# Patient Record
Sex: MALE | Race: WHITE | NOT HISPANIC OR LATINO | Employment: OTHER | URBAN - NONMETROPOLITAN AREA
[De-identification: names, ages, dates, MRNs, and addresses within clinical notes are randomized per-mention and may not be internally consistent; named-entity substitution may affect disease eponyms.]

---

## 2017-01-09 ENCOUNTER — OFFICE VISIT (OUTPATIENT)
Dept: PODIATRY | Facility: CLINIC | Age: 57
End: 2017-01-09

## 2017-01-09 VITALS — HEIGHT: 70 IN | WEIGHT: 220 LBS | BODY MASS INDEX: 31.5 KG/M2

## 2017-01-09 DIAGNOSIS — Z89.431 S/P TRANSMETATARSAL AMPUTATION OF FOOT, RIGHT (HCC): ICD-10-CM

## 2017-01-09 DIAGNOSIS — G62.9 POLYNEUROPATHY: ICD-10-CM

## 2017-01-09 DIAGNOSIS — Z89.422 TOE AMPUTATION STATUS, LEFT: ICD-10-CM

## 2017-01-09 DIAGNOSIS — T14.8XXA DEEP TISSUE INJURY: Primary | ICD-10-CM

## 2017-01-09 PROCEDURE — 99024 POSTOP FOLLOW-UP VISIT: CPT | Performed by: PODIATRIST

## 2017-01-09 RX ORDER — LISINOPRIL AND HYDROCHLOROTHIAZIDE 25; 20 MG/1; MG/1
TABLET ORAL
Refills: 1 | COMMUNITY
Start: 2016-11-29

## 2017-01-09 NOTE — MR AVS SNAPSHOT
"                        Damian Wild   1/9/2017 10:00 AM   Office Visit    Dept Phone:  [phone]   Encounter #:  [id number]    Provider:  Estuardo Fried DPM   Department:  [facility] PODIATRY                Your Full Care Plan              Your Updated Medication List          This list is accurate as of: 1/9/17 10:17 AM.  Always use your most recent med list.                lisinopril-hydrochlorothiazide 20-25 MG per tablet   Commonly known as:  PRINZIDE,ZESTORETIC               Instructions     None    Patient Instructions History      Upcoming Appointments     Visit Type Date Time Department    OFFICE VISIT 1/9/2017 10:00 AM [facility] PODIATRY SURG POW    FOLLOW UP 1/23/2017  9:00 AM [facility] PODIATRY SURG POW      MyChart Signup     Our records indicate that you have declined [facility] MyChart signup. If you would like to sign up for MyChart, please email [email or web address] or call [phone] to obtain an activation code.             Other Info from Your Visit           Your Appointments     Jan 23, 2017  9:00 AM CST   Follow Up with Estuardo Fried DPM   [facility] PODIATRY (--)    [address] [place] [address] Dr Chet [place] [id number] [phone]           Arrive 15 minutes prior to appointment.              Allergies     No Known Allergies      Reason for Visit     Left Foot - Post-op           Vital Signs     Height Weight Body Mass Index             70\" (177.8 cm) 220 lb (99.8 kg) 31.57 kg/m2           "

## 2017-01-09 NOTE — PROGRESS NOTES
"Damian Wild  1960  56 y.o. male   Patient presents today for a post op visit after being d/c from the hospital.    01/09/2017  Chief Complaint   Patient presents with   • Left Foot - Post-op           History of Present Illness    Mr Wild is a 57 y/o male who underwent open left hallux amputation on 12/27 with revision to partial 1st ray and closure on 12/29. He presents for follow up, pain improved. Denies N/V/F/C/SOB. Currently taking PO Keflex. Also has left lateral foot deep tissue injury which is painful.        No past medical history on file.      No past surgical history on file.      No family history on file.      Social History     Social History   • Marital status:      Spouse name: N/A   • Number of children: N/A   • Years of education: N/A     Occupational History   • Not on file.     Social History Main Topics   • Smoking status: Not on file   • Smokeless tobacco: Not on file   • Alcohol use Not on file   • Drug use: Not on file   • Sexual activity: Not on file     Other Topics Concern   • Not on file     Social History Narrative         Current Outpatient Prescriptions   Medication Sig Dispense Refill   • lisinopril-hydrochlorothiazide (PRINZIDE,ZESTORETIC) 20-25 MG per tablet   1     No current facility-administered medications for this visit.          OBJECTIVE    Visit Vitals   • Ht 70\" (177.8 cm)   • Wt 220 lb (99.8 kg)   • BMI 31.57 kg/m2         Review of Systems   Constitutional:  Denies recent weight loss, weight gain, fever or chills, no change in exercise tolerance  Musculoskeletal: Foot pain. H/o toe amputations   Skin:  Thickened nails. Left foot wounds.  Neurological:  Burning sensations to feet b/l.  Psychiatric/Behavioral: Denies depression        Physical Exam   Constitutional: he appears well-developed and well-nourished.   Psychiatric: he has a normal mood and affect. his   behavior is normal.      Lower Extremity Exam:  Vascular: DP/PT pulses palpable 1+. "   Negative hair growth.   Minimal left foot edema  Feel cool to touch  Neuro: Protective sensation absent, b/l.  DTRs intact  Integument:   Atrophic skin noted b/l  Left partial 1st ray incision coapted with sutures intact, no SOI  Dorsal foot bulla desiccated, healing well. No SOI  Stable DTI to lateral 5th met base. No fluctuance or SOI. +pain on palpation  Musculoskeletal: LE muscle strength 5/5.   Gait assisted normal  Semi-rigid hammertoe deformity toes 2-5 on left  Nails 2-5 on left thickened, elongated with subungual debris. +pain on palpation  Right TMA          ASSESSMENT AND PLAN    Damian was seen today for post-op.    Diagnoses and all orders for this visit:    Deep tissue injury    Toe amputation status, left    Polyneuropathy    S/P transmetatarsal amputation of foot, right      -Comprehensive foot and ankle exam performed  -Doing well postoperatively  -Left foot incision and DTI dressed with bacitracin and DSD  -Surgical shoe at all times  -Leave dressing c/d/i  -Recheck 2 weeks, likely suture removal          This document has been electronically signed by Estuardo Fried DPM on January 9, 2017 7:03 PM     Estuardo Fried DPM  1/9/2017  7:02 PM

## 2017-01-23 ENCOUNTER — OFFICE VISIT (OUTPATIENT)
Dept: PODIATRY | Facility: CLINIC | Age: 57
End: 2017-01-23

## 2017-01-23 VITALS — HEIGHT: 70 IN | WEIGHT: 220 LBS | BODY MASS INDEX: 31.5 KG/M2

## 2017-01-23 DIAGNOSIS — G62.9 POLYNEUROPATHY: ICD-10-CM

## 2017-01-23 DIAGNOSIS — T81.31XA SURGICAL WOUND BREAKDOWN, INITIAL ENCOUNTER: ICD-10-CM

## 2017-01-23 DIAGNOSIS — Z89.422 TOE AMPUTATION STATUS, LEFT: ICD-10-CM

## 2017-01-23 DIAGNOSIS — L97.522 FOOT ULCER WITH FAT LAYER EXPOSED, LEFT (HCC): Primary | ICD-10-CM

## 2017-01-23 DIAGNOSIS — Z89.431 S/P TRANSMETATARSAL AMPUTATION OF FOOT, RIGHT (HCC): ICD-10-CM

## 2017-01-23 PROCEDURE — 11042 DBRDMT SUBQ TIS 1ST 20SQCM/<: CPT | Performed by: PODIATRIST

## 2017-01-23 PROCEDURE — 99024 POSTOP FOLLOW-UP VISIT: CPT | Performed by: PODIATRIST

## 2017-01-23 NOTE — MR AVS SNAPSHOT
"                        Damian Wild   1/23/2017 9:00 AM   Office Visit    Dept Phone:  [phone]   Encounter #:  [id number]    Provider:  Estuardo Fried DPM   Department:  [facility] PODIATRY                Your Full Care Plan              Your Updated Medication List          This list is accurate as of: 1/23/17  9:19 AM.  Always use your most recent med list.                lisinopril-hydrochlorothiazide 20-25 MG per tablet   Commonly known as:  PRINZIDE,ZESTORETIC               Instructions     None    Patient Instructions History      Upcoming Appointments     Visit Type Date Time Department    FOLLOW UP 1/23/2017  9:00 AM [facility] PODIATRY SURG POW    FOLLOW UP 2/6/2017  9:00 AM [facility]ODIATRY SURG POW      MyChart Signup     Our records [facility] that you [facility]. If you would like to sign up for MyChart, please email [email or web address] or call [phone] to obtain an activation code.             Other Info from Your Visit           Your Appointments     Feb 06, 2017  9:00 AM CST   Follow Up with Estuardo Fried DPM   [facility] PODIATRY (--)    [address] [place] [address] Dr Chet [place] [id number] [phone]           Arrive 15 minutes prior to appointment.              Allergies     No Known Allergies      Reason for Visit     Left Foot - Follow-up           Vital Signs     Height Weight Body Mass Index             70\" (177.8 cm) 220 lb (99.8 kg) 31.57 kg/m2           "

## 2017-01-23 NOTE — PROGRESS NOTES
"Damian Wild  1960  56 y.o. male   Patient presents today for a post op visit after being d/c from the hospital.    01/23/2017  Chief Complaint   Patient presents with   • Left Foot - Follow-up           History of Present Illness    Mr Wild is a 55 y/o male who underwent open left hallux amputation on 12/27 with revision to partial 1st ray and closure on 12/29. He presents for follow up, pain improved. Denies N/V/F/C/SOB. Has completed PO Keflex course. Left lateral foot deep tissue injury improving.        No past medical history on file.      No past surgical history on file.      No family history on file.      Social History     Social History   • Marital status:      Spouse name: N/A   • Number of children: N/A   • Years of education: N/A     Occupational History   • Not on file.     Social History Main Topics   • Smoking status: Not on file   • Smokeless tobacco: Not on file   • Alcohol use Not on file   • Drug use: Not on file   • Sexual activity: Not on file     Other Topics Concern   • Not on file     Social History Narrative         Current Outpatient Prescriptions   Medication Sig Dispense Refill   • lisinopril-hydrochlorothiazide (PRINZIDE,ZESTORETIC) 20-25 MG per tablet   1     No current facility-administered medications for this visit.          OBJECTIVE    Visit Vitals   • Ht 70\" (177.8 cm)   • Wt 220 lb (99.8 kg)   • BMI 31.57 kg/m2         Review of Systems   Constitutional:  Denies recent weight loss, weight gain, fever or chills, no change in exercise tolerance  Musculoskeletal: Foot pain. H/o toe amputations   Skin:  Thickened nails. Left foot wounds.  Neurological:  Burning sensations to feet b/l.  Psychiatric/Behavioral: Denies depression        Physical Exam   Constitutional: he appears well-developed and well-nourished.   Psychiatric: he has a normal mood and affect. his   behavior is normal.      Lower Extremity Exam:  Vascular: DP/PT pulses palpable 1+.   Negative " hair growth.   Minimal left foot edema  Feel cool to touch  Neuro: Protective sensation absent, b/l.  DTRs intact  Integument:   Atrophic skin noted b/l  Left partial 1st ray incision coapted with sutures intact, no SOI. Slight breakdown of distal incision. Wound measuring 1.5 x 0.6 x 0.3 cm   Dorsal foot bulla desiccated, healing well. No SOI  Stable DTI to lateral 5th met base. No fluctuance or SOI. +pain on palpation  1.0 x 0.8 x 0.3 cm FT ulcer to medial midfoot. Fibrous base. No SOI  Musculoskeletal: LE muscle strength 5/5.   Gait  normal  Semi-rigid hammertoe deformity toes 2-5 on left  Nails 2-5 on left thickened, elongated with subungual debris. +pain on palpation  Right TMA    Left foot wound debridement:  Risks and benefits discussed.  Right hallux ulcer debrided of surrounding hyperkeratosis and devitalized tissue with 15 blade to level of subq  Pressure hemostasis obtained  Abx ointment and dsd applied.        ASSESSMENT AND PLAN    Damian was seen today for follow-up.    Diagnoses and all orders for this visit:    Foot ulcer with fat layer exposed, left    Toe amputation status, left    Polyneuropathy    S/P transmetatarsal amputation of foot, right    -Comprehensive foot and ankle exam performed  -Pt educated on standard wound care staples including offloading, dressing changes, and serial debridements.  -Wound debridement as above  -Doing well postoperatively  -Several sutures removed, few left intact  -Left foot incision and DTI dressed with bacitracin and DSD  -Surgical shoe at all times  -Leave dressing c/d/i  -Recheck 2 weeks          This document has been electronically signed by Estuardo Fried DPM on January 23, 2017 12:06 PM     Estuardo Fried DPM  1/23/2017  12:06 PM

## 2017-02-06 ENCOUNTER — OFFICE VISIT (OUTPATIENT)
Dept: PODIATRY | Facility: CLINIC | Age: 57
End: 2017-02-06

## 2017-02-06 VITALS — HEIGHT: 70 IN | BODY MASS INDEX: 31.5 KG/M2 | WEIGHT: 220 LBS

## 2017-02-06 DIAGNOSIS — Z89.431 S/P TRANSMETATARSAL AMPUTATION OF FOOT, RIGHT (HCC): ICD-10-CM

## 2017-02-06 DIAGNOSIS — M79.671 FOOT PAIN, RIGHT: ICD-10-CM

## 2017-02-06 DIAGNOSIS — T14.8XXA DEEP TISSUE INJURY: Primary | ICD-10-CM

## 2017-02-06 DIAGNOSIS — L97.522 FOOT ULCER WITH FAT LAYER EXPOSED, LEFT (HCC): ICD-10-CM

## 2017-02-06 DIAGNOSIS — Z89.422 TOE AMPUTATION STATUS, LEFT: ICD-10-CM

## 2017-02-06 PROCEDURE — 11042 DBRDMT SUBQ TIS 1ST 20SQCM/<: CPT | Performed by: PODIATRIST

## 2017-02-06 PROCEDURE — 99213 OFFICE O/P EST LOW 20 MIN: CPT | Performed by: PODIATRIST

## 2017-02-06 NOTE — PROGRESS NOTES
"Damian Wild  1960  56 y.o. male   Patient presents today for a post op visit after being d/c from the hospital.    02/06/2017  Chief Complaint   Patient presents with   • Left Foot - Follow-up, Post-op           History of Present Illness    Mr Wild is a 57 y/o male who underwent open left hallux amputation on 12/27 with revision to partial 1st ray and closure on 12/29. He presents for follow up, pain improved. Denies N/V/F/C/SOB. He does have a new problem of pressure wound on his right anterior ankle.      No past medical history on file.      No past surgical history on file.      No family history on file.      Social History     Social History   • Marital status:      Spouse name: N/A   • Number of children: N/A   • Years of education: N/A     Occupational History   • Not on file.     Social History Main Topics   • Smoking status: Never Smoker   • Smokeless tobacco: Not on file   • Alcohol use No   • Drug use: Not on file   • Sexual activity: Not on file     Other Topics Concern   • Not on file     Social History Narrative   • No narrative on file         Current Outpatient Prescriptions   Medication Sig Dispense Refill   • lisinopril-hydrochlorothiazide (PRINZIDE,ZESTORETIC) 20-25 MG per tablet   1     No current facility-administered medications for this visit.          OBJECTIVE    Visit Vitals   • Ht 70\" (177.8 cm)   • Wt 220 lb (99.8 kg)   • BMI 31.57 kg/m2         Review of Systems   Constitutional:  Denies recent weight loss, weight gain, fever or chills, no change in exercise tolerance  Musculoskeletal: Foot pain. H/o toe amputations   Skin:  Thickened nails. Left foot wounds.  Neurological:  Burning sensations to feet b/l.  Psychiatric/Behavioral: Denies depression        Physical Exam   Constitutional: he appears well-developed and well-nourished.   Psychiatric: he has a normal mood and affect. his   behavior is normal.      Lower Extremity Exam:  Vascular: DP/PT pulses " palpable 1+.   Negative hair growth.   Minimal left foot edema  Feel cool to touch  Neuro: Protective sensation absent, b/l.  DTRs intact  Integument:   Atrophic skin noted b/l  Left partial 1st ray incision coapted with some sutures intact, no SOI. Slight breakdown of distal incision. Wound measuring 1.2 x 0.6 x 0.3 cm . Fibrous base.  Dorsal foot bulla desiccated, healing well. No SOI  Stable DTI to left lateral 5th met base. No fluctuance or SOI. +pain on palpation  1.0 x 0.8 x 0.1 cm FT ulcer to left medial midfoot. Fibrous base. No SOI  New DTI noted to dorsal right ankle, lateral 5th met base. No fluctuance or SOI.  Musculoskeletal: LE muscle strength 5/5.   Gait  normal  Semi-rigid hammertoe deformity toes 2-5 on left  Nails 2-5 on left thickened, elongated with subungual debris. +pain on palpation  Right TMA    Left foot wound debridement:  Risks and benefits discussed.  Right hallux ulcer debrided of surrounding hyperkeratosis and devitalized tissue with 15 blade to level of subq  Pressure hemostasis obtained  Medihoney and dsd applied.        ASSESSMENT AND PLAN    Damian was seen today for follow-up and post-op.    Diagnoses and all orders for this visit:    Deep tissue injury    Foot ulcer with fat layer exposed, left    Toe amputation status, left    S/P transmetatarsal amputation of foot, right    Foot pain, right      -Comprehensive foot and ankle exam performed  -Pt educated on standard wound care staples including offloading, dressing changes, and serial debridements.  -Wound debridement as above  -Doing well postoperatively  -Remaining sutures removed  -Left foot incision and DTI dressed with Medihoney and DSD  -New DTI on right stable without signs of infection medihoney and bandaid applied.  -Surgical shoe at all times  -Leave dressing c/d/i  -Recheck 2 weeks          This document has been electronically signed by Estuardo Fried DPM on February 6, 2017 5:52 PM     Estuardo Fried  NAVJOT  2/6/2017  5:52 PM

## 2017-02-20 ENCOUNTER — OFFICE VISIT (OUTPATIENT)
Dept: PODIATRY | Facility: CLINIC | Age: 57
End: 2017-02-20

## 2017-02-20 VITALS — BODY MASS INDEX: 31.5 KG/M2 | HEIGHT: 70 IN | WEIGHT: 220 LBS

## 2017-02-20 DIAGNOSIS — Z89.431 S/P TRANSMETATARSAL AMPUTATION OF FOOT, RIGHT (HCC): ICD-10-CM

## 2017-02-20 DIAGNOSIS — L97.512 FOOT ULCER WITH FAT LAYER EXPOSED, RIGHT (HCC): ICD-10-CM

## 2017-02-20 DIAGNOSIS — T14.8XXA DEEP TISSUE INJURY: Primary | ICD-10-CM

## 2017-02-20 DIAGNOSIS — L97.522 FOOT ULCER WITH FAT LAYER EXPOSED, LEFT (HCC): ICD-10-CM

## 2017-02-20 DIAGNOSIS — Z89.422 TOE AMPUTATION STATUS, LEFT: ICD-10-CM

## 2017-02-20 PROCEDURE — 11042 DBRDMT SUBQ TIS 1ST 20SQCM/<: CPT | Performed by: PODIATRIST

## 2017-02-20 PROCEDURE — 99024 POSTOP FOLLOW-UP VISIT: CPT | Performed by: PODIATRIST

## 2017-02-20 NOTE — PROGRESS NOTES
"Damian Wild  1960  56 y.o. male   Patient presents today for a post op visit after being d/c from the hospital.    02/20/2017  Chief Complaint   Patient presents with   • Left Foot - Follow-up, Post-op           History of Present Illness    Mr Wild is a 57 y/o male who underwent open left hallux amputation on 12/27 with revision to partial 1st ray and closure on 12/29. He also presents for follow-up of bilateral foot pressure ulcer.      No past medical history on file.      No past surgical history on file.      No family history on file.      Social History     Social History   • Marital status:      Spouse name: N/A   • Number of children: N/A   • Years of education: N/A     Occupational History   • Not on file.     Social History Main Topics   • Smoking status: Never Smoker   • Smokeless tobacco: Not on file   • Alcohol use No   • Drug use: Not on file   • Sexual activity: Not on file     Other Topics Concern   • Not on file     Social History Narrative         Current Outpatient Prescriptions   Medication Sig Dispense Refill   • lisinopril-hydrochlorothiazide (PRINZIDE,ZESTORETIC) 20-25 MG per tablet   1     No current facility-administered medications for this visit.          OBJECTIVE    Visit Vitals   • Ht 70\" (177.8 cm)   • Wt 220 lb (99.8 kg)   • BMI 31.57 kg/m2         Review of Systems   Constitutional:  Denies recent weight loss, weight gain, fever or chills, no change in exercise tolerance  Musculoskeletal: Foot pain. H/o toe amputations   Skin:  Thickened nails. Bilateral foot wounds.  Neurological:  Burning sensations to feet b/l.  Psychiatric/Behavioral: Denies depression        Physical Exam   Constitutional: he appears well-developed and well-nourished.   Psychiatric: he has a normal mood and affect. his   behavior is normal.      Lower Extremity Exam:  Vascular: DP/PT pulses palpable 1+.   Negative hair growth.   Minimal left foot edema  Feel cool to touch  Neuro: " Protective sensation absent, b/l.  DTRs intact  Integument:   Atrophic skin noted b/l  Left partial 1st ray incision with Slight breakdown of distal incision. No SOI. Wound measuring 0.8 x 0.6 x 0.3 cm . Fibrous base.  Dorsal foot bulla desiccated, healing well. No SOI  Stable DTI to left lateral 5th met base. No fluctuance or SOI. +pain on palpation  Left dorsal midfoot ulcer healed.  Dorsal right ankle DTI, now FT ulceration 2.5 x 2.0 x 0.3 cm with fibronecrotic base.  Musculoskeletal: LE muscle strength 5/5.   Gait  normal  Semi-rigid hammertoe deformity toes 2-5 on left  Nails 2-5 on left thickened, elongated with subungual debris. +pain on palpation  Right TMA    Bilateral foot wound debridement:  Risks and benefits discussed.  Left medial and right dorsal foot ulcer debrided of surrounding hyperkeratosis and devitalized tissue with 15 blade to level of subq  Pressure hemostasis obtained  Medihoney and dsd applied.        ASSESSMENT AND PLAN    Damian was seen today for follow-up and post-op.    Diagnoses and all orders for this visit:    Deep tissue injury    Foot ulcer with fat layer exposed, left    Toe amputation status, left    S/P transmetatarsal amputation of foot, right    Foot ulcer with fat layer exposed, right    -Comprehensive foot and ankle exam performed  -Pt educated on standard wound care staples including offloading, dressing changes, and serial debridements.  -Wound debridement as above  -Doing well postoperatively  -Left foot incision and DTI dressed with Medihoney and DSD  -New DTI on right stable without signs of infection medihoney and bandaid applied.  -Surgical shoe at all times  -Leave dressing c/d/i  -Recheck 2 weeks          This document has been electronically signed by Estuardo Fried DPM on February 22, 2017 5:14 PM     Estuardo Fried DPM  2/22/2017  5:14 PM

## 2017-03-06 ENCOUNTER — OFFICE VISIT (OUTPATIENT)
Dept: PODIATRY | Facility: CLINIC | Age: 57
End: 2017-03-06

## 2017-03-06 VITALS — HEIGHT: 70 IN | WEIGHT: 220 LBS | BODY MASS INDEX: 31.5 KG/M2

## 2017-03-06 DIAGNOSIS — Z89.431 S/P TRANSMETATARSAL AMPUTATION OF FOOT, RIGHT (HCC): ICD-10-CM

## 2017-03-06 DIAGNOSIS — L97.522 FOOT ULCER WITH FAT LAYER EXPOSED, LEFT (HCC): ICD-10-CM

## 2017-03-06 DIAGNOSIS — L97.512 FOOT ULCER WITH FAT LAYER EXPOSED, RIGHT (HCC): ICD-10-CM

## 2017-03-06 DIAGNOSIS — Z89.422 TOE AMPUTATION STATUS, LEFT: Primary | ICD-10-CM

## 2017-03-06 PROCEDURE — 11042 DBRDMT SUBQ TIS 1ST 20SQCM/<: CPT | Performed by: PODIATRIST

## 2017-03-06 RX ORDER — CLINDAMYCIN HYDROCHLORIDE 300 MG/1
300 CAPSULE ORAL 3 TIMES DAILY
Qty: 30 CAPSULE | Refills: 0 | Status: SHIPPED | OUTPATIENT
Start: 2017-03-06 | End: 2017-03-28 | Stop reason: SDUPTHER

## 2017-03-06 NOTE — PROGRESS NOTES
"Damian Wild  1960  56 y.o. male   Patient presents today for a post op visit after being d/c from the hospital.    03/06/2017  Chief Complaint   Patient presents with   • Left Foot - Follow-up           History of Present Illness    Mr Wild is a 57 y/o male who underwent open left hallux amputation on 12/27 with revision to partial 1st ray and closure on 12/29. He also presents for follow-up of incisional breakdown. Reports increased pain today and a new ulceration to his plantar left foot.      No past medical history on file.      No past surgical history on file.      No family history on file.      Social History     Social History   • Marital status:      Spouse name: N/A   • Number of children: N/A   • Years of education: N/A     Occupational History   • Not on file.     Social History Main Topics   • Smoking status: Never Smoker   • Smokeless tobacco: Not on file   • Alcohol use No   • Drug use: Not on file   • Sexual activity: Not on file     Other Topics Concern   • Not on file     Social History Narrative         Current Outpatient Prescriptions   Medication Sig Dispense Refill   • clindamycin (CLEOCIN) 300 MG capsule Take 1 capsule by mouth 3 (Three) Times a Day. 30 capsule 0   • lisinopril-hydrochlorothiazide (PRINZIDE,ZESTORETIC) 20-25 MG per tablet   1     No current facility-administered medications for this visit.          OBJECTIVE    Visit Vitals   • Ht 70\" (177.8 cm)   • Wt 220 lb (99.8 kg)   • BMI 31.57 kg/m2         Review of Systems   Constitutional:  Denies recent weight loss, weight gain, fever or chills, no change in exercise tolerance  Musculoskeletal: Foot pain. H/o toe amputations   Skin:  Thickened nails. Bilateral foot wounds.  Neurological:  Burning sensations to feet b/l.  Psychiatric/Behavioral: Denies depression        Physical Exam   Constitutional: he appears well-developed and well-nourished.   Psychiatric: he has a normal mood and affect. his   behavior " is normal.      Lower Extremity Exam:  Vascular: DP/PT pulses palpable 1+.   Negative hair growth.   Minimal left foot edema  Feel cool to touch  Neuro: Protective sensation absent, b/l.  DTRs intact  Integument:   Atrophic skin noted b/l  Left partial 1st ray incision with Slight breakdown of distal incision. No SOI. Wound measuring 0.6 x 0.3 x 0.3 cm . Fibrous base.  Dorsal foot bulla desiccated, healing well. No SOI  Stable DTI to left lateral 5th met base. No fluctuance or SOI. +pain on palpation  Dorsal right ankle DTI, now FT ulceration 2.0 x 1.0 x 0.3 cm with fibronecrotic base.  New plantar 2nd mpj ulceration on left. 2.2 x 2.0 x 0.3 cm with fibronecrotic base and surrounding HPK. No SOI.  Musculoskeletal: LE muscle strength 5/5.   Gait  normal  Semi-rigid hammertoe deformity toes 2-5 on left  Nails 2-5 on left thickened, elongated with subungual debris. +pain on palpation  Right TMA    Bilateral foot wound debridement:  Risks and benefits discussed.  Left plantar, medial and right dorsal foot ulcer debrided of surrounding hyperkeratosis and devitalized tissue with 15 blade to level of subq  Pressure hemostasis obtained  Medihoney and dsd applied.        ASSESSMENT AND PLAN    Damian was seen today for follow-up.    Diagnoses and all orders for this visit:    Toe amputation status, left    S/P transmetatarsal amputation of foot, right    Foot ulcer with fat layer exposed, right    Foot ulcer with fat layer exposed, left    Other orders  -     clindamycin (CLEOCIN) 300 MG capsule; Take 1 capsule by mouth 3 (Three) Times a Day.    -Comprehensive foot and ankle exam performed  -Pt educated on standard wound care staples including offloading, dressing changes, and serial debridements.  -Wound debridement as above  -Incision site healing well, however pt with new ulcerations today.  -Left foot incision and DTI dressed with Medihoney and DSD  -New surgical shoe fabricated for ulcer offloading, to be worn at all  times  -Leave dressing c/d/i  -Recheck 1 week          This document has been electronically signed by Estuardo Fried DPM on March 6, 2017 12:24 PM     Estuardo Fried DPM  3/6/2017  12:24 PM

## 2017-03-13 ENCOUNTER — OFFICE VISIT (OUTPATIENT)
Dept: PODIATRY | Facility: CLINIC | Age: 57
End: 2017-03-13

## 2017-03-13 VITALS — WEIGHT: 220 LBS | BODY MASS INDEX: 31.5 KG/M2 | HEIGHT: 70 IN

## 2017-03-13 DIAGNOSIS — Z89.431 S/P TRANSMETATARSAL AMPUTATION OF FOOT, RIGHT (HCC): ICD-10-CM

## 2017-03-13 DIAGNOSIS — L97.522 FOOT ULCER, LEFT, WITH FAT LAYER EXPOSED (HCC): Primary | ICD-10-CM

## 2017-03-13 DIAGNOSIS — Z89.422 TOE AMPUTATION STATUS, LEFT: ICD-10-CM

## 2017-03-13 PROCEDURE — 11042 DBRDMT SUBQ TIS 1ST 20SQCM/<: CPT | Performed by: PODIATRIST

## 2017-03-13 PROCEDURE — 99024 POSTOP FOLLOW-UP VISIT: CPT | Performed by: PODIATRIST

## 2017-03-13 NOTE — PROGRESS NOTES
"Damian Wild  1960  56 y.o. male   Patient presents today for a post op visit after being d/c from the hospital.    03/13/2017  Chief Complaint   Patient presents with   • Left Foot - Follow-up   • Right Foot - Follow-up           History of Present Illness    Mr Wild is a 55 y/o male who underwent open left hallux amputation on 12/27 with revision to partial 1st ray and closure on 12/29. He also presents for follow-up of incisional breakdown. At last visit he did have new ulcer to his plantar 2nd mtpj on the left. Complains of moderate pain today.    No past medical history on file.      No past surgical history on file.      No family history on file.      Social History     Social History   • Marital status:      Spouse name: N/A   • Number of children: N/A   • Years of education: N/A     Occupational History   • Not on file.     Social History Main Topics   • Smoking status: Never Smoker   • Smokeless tobacco: Not on file   • Alcohol use No   • Drug use: Not on file   • Sexual activity: Not on file     Other Topics Concern   • Not on file     Social History Narrative         Current Outpatient Prescriptions   Medication Sig Dispense Refill   • clindamycin (CLEOCIN) 300 MG capsule Take 1 capsule by mouth 3 (Three) Times a Day. 30 capsule 0   • lisinopril-hydrochlorothiazide (PRINZIDE,ZESTORETIC) 20-25 MG per tablet   1     No current facility-administered medications for this visit.          OBJECTIVE    Visit Vitals   • Ht 70\" (177.8 cm)   • Wt 220 lb (99.8 kg)   • BMI 31.57 kg/m2         Review of Systems   Constitutional:  Denies recent weight loss, weight gain, fever or chills, no change in exercise tolerance  Musculoskeletal: Foot pain. H/o toe amputations   Skin:  Thickened nails. Bilateral foot wounds.  Neurological:  Burning sensations to feet b/l.  Psychiatric/Behavioral: Denies depression        Physical Exam   Constitutional: he appears well-developed and well-nourished. "   Psychiatric: he has a normal mood and affect. his   behavior is normal.      Lower Extremity Exam:  Vascular: DP/PT pulses palpable 1+.   Negative hair growth.   Minimal left foot edema  Feel cool to touch  Neuro: Protective sensation absent, b/l.  DTRs intact  Integument:   Atrophic skin noted b/l  Left partial 1st ray incision with Slight breakdown of distal incision. No SOI. Wound measuring 0.3 x 0.3 x 0.3 cm . Fibrous base.  Dorsal foot bulla desiccated, healing well. No SOI  Left lateral 5th met base 3.2 x 2.6 x 0.3 with fibrous base. No fluctuance or SOI. +pain on palpation  Dorsal right ankle DTI, now FT ulceration 2.0 x 0.8 x 0.3 cm with fibronecrotic base.  New plantar 2nd mpj ulceration on left. 2.0 x 3.0 x 0.3 cm with fibronecrotic base and surrounding HPK. No SOI.  Musculoskeletal: LE muscle strength 5/5.   Gait  normal  Semi-rigid hammertoe deformity toes 2-5 on left  Nails 2-5 on left thickened, elongated with subungual debris. +pain on palpation  Right TMA    Left foot wound debridement:  Risks and benefits discussed.  Left plantar, medial and right dorsal foot ulcer debrided of surrounding hyperkeratosis and devitalized tissue with 15 blade to level of subq  Pressure hemostasis obtained  Medihoney and dsd applied.        ASSESSMENT AND PLAN    Damian was seen today for follow-up and follow-up.    Diagnoses and all orders for this visit:    Foot ulcer, left, with fat layer exposed  -     Ambulatory Referral to Home Health    Toe amputation status, left    S/P transmetatarsal amputation of foot, right    -Comprehensive foot and ankle exam performed  -Pt educated on standard wound care staples including offloading, dressing changes, and serial debridements.  -Wound debridement as above  -Incision site healing well, however pt with additional ulceratios  -Cont offloading surgical shoe at all times  -Leave dressing c/d/i  -Home health consult for additional dressing changes  -Recheck 1 week          This  document has been electronically signed by Estuardo Fried DPM on March 13, 2017 9:06 PM     Estuarod Fried DPM  3/13/2017  9:06 PM

## 2017-03-20 ENCOUNTER — OFFICE VISIT (OUTPATIENT)
Dept: PODIATRY | Facility: CLINIC | Age: 57
End: 2017-03-20

## 2017-03-20 VITALS — WEIGHT: 220 LBS | BODY MASS INDEX: 31.5 KG/M2 | HEIGHT: 70 IN

## 2017-03-20 DIAGNOSIS — Z89.422 TOE AMPUTATION STATUS, LEFT: ICD-10-CM

## 2017-03-20 DIAGNOSIS — L97.522 FOOT ULCER, LEFT, WITH FAT LAYER EXPOSED (HCC): Primary | ICD-10-CM

## 2017-03-20 DIAGNOSIS — Z89.431 S/P TRANSMETATARSAL AMPUTATION OF FOOT, RIGHT (HCC): ICD-10-CM

## 2017-03-20 PROCEDURE — 11042 DBRDMT SUBQ TIS 1ST 20SQCM/<: CPT | Performed by: PODIATRIST

## 2017-03-20 NOTE — PROGRESS NOTES
"Damian Wild  1960  56 y.o. male   Patient presents today for a post op visit after being d/c from the hospital.    03/20/2017  Chief Complaint   Patient presents with   • Left Foot - Follow-up   • Right Foot - Follow-up           History of Present Illness    Mr Wild is a 55 y/o male who underwent open left hallux amputation on 12/27 with revision to partial 1st ray and closure on 12/29. He has healed his amputation site, but now with left plantar and lateral foot wounds. At last visit we did try to set him up with home care, but he states they have not contacted him yet.    No past medical history on file.      No past surgical history on file.      No family history on file.      Social History     Social History   • Marital status:      Spouse name: N/A   • Number of children: N/A   • Years of education: N/A     Occupational History   • Not on file.     Social History Main Topics   • Smoking status: Never Smoker   • Smokeless tobacco: Not on file   • Alcohol use No   • Drug use: Not on file   • Sexual activity: Not on file     Other Topics Concern   • Not on file     Social History Narrative         Current Outpatient Prescriptions   Medication Sig Dispense Refill   • clindamycin (CLEOCIN) 300 MG capsule Take 1 capsule by mouth 3 (Three) Times a Day. 30 capsule 0   • lisinopril-hydrochlorothiazide (PRINZIDE,ZESTORETIC) 20-25 MG per tablet   1     No current facility-administered medications for this visit.          OBJECTIVE    Visit Vitals   • Ht 70\" (177.8 cm)   • Wt 220 lb (99.8 kg)   • BMI 31.57 kg/m2         Review of Systems   Constitutional:  Denies recent weight loss, weight gain, fever or chills, no change in exercise tolerance  Musculoskeletal: Foot pain. H/o toe amputations   Skin:  Thickened nails. Bilateral foot wounds.  Neurological:  Burning sensations to feet b/l.  Psychiatric/Behavioral: Denies depression        Physical Exam   Constitutional: he appears well-developed and " well-nourished.   Psychiatric: he has a normal mood and affect. his   behavior is normal.      Lower Extremity Exam:  Vascular: DP/PT pulses palpable 1+.   Negative hair growth.   Minimal left foot edema  Feel cool to touch  Neuro: Protective sensation absent, b/l.  DTRs intact  Integument:   Atrophic skin noted b/l  Left partial 1st ray incision now healed  Dorsal foot bulla desiccated, healing well. No SOI  Left lateral 5th met base 3.0 x 2.6 x 0.3 with fibrous base. No fluctuance or SOI. +pain on palpation  Dorsal right ankle FT ulceration 1.5 x 0.6 x 0.3 cm with fibronecrotic base.  Plantar 2nd mpj ulceration on left. 2.0 x 3.0 x 0.3 cm with fibronecrotic base and surrounding HPK. No SOI.  Musculoskeletal: LE muscle strength 5/5.   Gait  normal  Semi-rigid hammertoe deformity toes 2-5 on left  Nails 2-5 on left thickened, elongated with subungual debris. +pain on palpation  Right TMA    Left foot wound debridement:  Risks and benefits discussed.  Left plantar, medial and right dorsal foot ulcer debrided of surrounding hyperkeratosis and devitalized tissue with 15 blade to level of subq  Pressure hemostasis obtained  Medihoney and dsd applied.        ASSESSMENT AND PLAN    Damian was seen today for follow-up and follow-up.    Diagnoses and all orders for this visit:    Foot ulcer, left, with fat layer exposed    Toe amputation status, left    S/P transmetatarsal amputation of foot, right    -Comprehensive foot and ankle exam performed  -Wound debridement as above  -Incision site healing well, however pt with additional ulcerations  -Cont offloading surgical shoe at all times  -Leave dressing c/d/i  -Home health consult for additional dressing changes  -Will look into skin substitutes if necessary  -Recheck 1 week          This document has been electronically signed by Estuardo Fried DPM on March 21, 2017 9:24 PM     Estuardo Fried DPM  3/21/2017  9:24 PM

## 2017-03-28 ENCOUNTER — OFFICE VISIT (OUTPATIENT)
Dept: PODIATRY | Facility: CLINIC | Age: 57
End: 2017-03-28

## 2017-03-28 VITALS — WEIGHT: 220 LBS | HEIGHT: 70 IN | BODY MASS INDEX: 31.5 KG/M2

## 2017-03-28 DIAGNOSIS — Z89.431 S/P TRANSMETATARSAL AMPUTATION OF FOOT, RIGHT (HCC): ICD-10-CM

## 2017-03-28 DIAGNOSIS — Z89.422 TOE AMPUTATION STATUS, LEFT: ICD-10-CM

## 2017-03-28 DIAGNOSIS — L97.522 FOOT ULCER, LEFT, WITH FAT LAYER EXPOSED (HCC): Primary | ICD-10-CM

## 2017-03-28 DIAGNOSIS — L03.116 CELLULITIS OF FOOT WITHOUT TOES, LEFT: ICD-10-CM

## 2017-03-28 PROCEDURE — 11042 DBRDMT SUBQ TIS 1ST 20SQCM/<: CPT | Performed by: PODIATRIST

## 2017-03-28 RX ORDER — TRAMADOL HYDROCHLORIDE 50 MG/1
50 TABLET ORAL EVERY 6 HOURS PRN
Qty: 40 TABLET | Refills: 0 | Status: SHIPPED | OUTPATIENT
Start: 2017-03-28 | End: 2017-10-06

## 2017-03-28 RX ORDER — CLINDAMYCIN HYDROCHLORIDE 300 MG/1
300 CAPSULE ORAL 3 TIMES DAILY
Qty: 42 CAPSULE | Refills: 0 | Status: SHIPPED | OUTPATIENT
Start: 2017-03-28 | End: 2017-04-25 | Stop reason: HOSPADM

## 2017-04-03 ENCOUNTER — OFFICE VISIT (OUTPATIENT)
Dept: PODIATRY | Facility: CLINIC | Age: 57
End: 2017-04-03

## 2017-04-03 VITALS — HEIGHT: 70 IN | WEIGHT: 220 LBS | BODY MASS INDEX: 31.5 KG/M2

## 2017-04-03 DIAGNOSIS — Z89.431 S/P TRANSMETATARSAL AMPUTATION OF FOOT, RIGHT (HCC): ICD-10-CM

## 2017-04-03 DIAGNOSIS — L89.893: ICD-10-CM

## 2017-04-03 DIAGNOSIS — Z89.422 TOE AMPUTATION STATUS, LEFT: ICD-10-CM

## 2017-04-03 DIAGNOSIS — L97.522 FOOT ULCER, LEFT, WITH FAT LAYER EXPOSED (HCC): Primary | ICD-10-CM

## 2017-04-03 PROCEDURE — 11042 DBRDMT SUBQ TIS 1ST 20SQCM/<: CPT | Performed by: PODIATRIST

## 2017-04-03 NOTE — PROGRESS NOTES
"Damian Wild  1960  56 y.o. male   Patient presents today for a wound check to the left foot.     04/03/2017  Chief Complaint   Patient presents with   • Left Foot - Follow-up           History of Present Illness    Mr Wild is a 57 y/o male who underwent open left hallux amputation on 12/27 with revision to partial 1st ray and closure on 12/29. He has healed his amputation site, but now with left plantar and lateral foot wounds.He has established home care for help with dressing changes. He continues to complain of pain today. Minimal improvement since initiation of clindamycin, ultram. Denies N/V/F/C/SOB.  No past medical history on file.      No past surgical history on file.      No family history on file.      Social History     Social History   • Marital status:      Spouse name: N/A   • Number of children: N/A   • Years of education: N/A     Occupational History   • Not on file.     Social History Main Topics   • Smoking status: Never Smoker   • Smokeless tobacco: Not on file   • Alcohol use No   • Drug use: Not on file   • Sexual activity: Not on file     Other Topics Concern   • Not on file     Social History Narrative         Current Outpatient Prescriptions   Medication Sig Dispense Refill   • clindamycin (CLEOCIN) 300 MG capsule Take 1 capsule by mouth 3 (Three) Times a Day. 42 capsule 0   • lisinopril-hydrochlorothiazide (PRINZIDE,ZESTORETIC) 20-25 MG per tablet   1   • traMADol (ULTRAM) 50 MG tablet Take 1 tablet by mouth Every 6 (Six) Hours As Needed for Moderate Pain (4-6) (pain). Take one to two every 4-6 hours prn pain. 40 tablet 0     No current facility-administered medications for this visit.          OBJECTIVE    Ht 70\" (177.8 cm)  Wt 220 lb (99.8 kg)  BMI 31.57 kg/m2      Review of Systems   Constitutional:  Denies recent weight loss, weight gain, fever or chills, no change in exercise tolerance  Musculoskeletal: Foot pain. H/o toe amputations   Skin:  Thickened nails. " Bilateral foot wounds.  Neurological:  Burning sensations to feet b/l.  Psychiatric/Behavioral: Denies depression        Physical Exam   Constitutional: he appears well-developed and well-nourished.   Psychiatric: he has a normal mood and affect. his   behavior is normal.      Lower Extremity Exam:  Vascular: DP/PT pulses palpable 1+.   Negative hair growth.   Minimal left foot edema  Feel cool to touch  Neuro: Protective sensation absent, b/l.  DTRs intact  Integument:   Atrophic skin noted b/l  Left partial 1st ray incision now healed  Left lateral 5th met base 3.0 x 2.0 x 0.3 with fibrogranular base. No fluctuance. No drainage, mild periwound erythema +pain on palpation  Dorsal right ankle FT ulceration 0.8 x 0.6 x 0.3 cm with fibronecrotic base.  Plantar 2nd mpj ulceration on left. 3.0 x 1.6 x 0.3 cm with fibronecrotic base and surrounding HPK. No SOI. No PTB  Musculoskeletal: LE muscle strength 5/5.   Gait  normal  Semi-rigid hammertoe deformity toes 2-5 on left  Nails 2-5 on left thickened, elongated with subungual debris. +pain on palpation  Right TMA    Left foot wound debridement:  Risks and benefits discussed.  Left plantar, medial and right dorsal foot ulcer debrided of surrounding hyperkeratosis and devitalized tissue with 15 blade to level of subq  Pressure hemostasis obtained  Medihoney and dsd applied.        ASSESSMENT AND PLAN    Damian was seen today for follow-up.    Diagnoses and all orders for this visit:    Foot ulcer, left, with fat layer exposed    Toe amputation status, left    S/P transmetatarsal amputation of foot, right    -Comprehensive foot and ankle exam performed  -Wound debridement as above  -Cont offloading surgical shoe at all times  -Leave dressing c/d/i  -Cont Home health  for additional dressing changes  -Cont Clindamycin 300mg TID for increased pain, erythema  -Cont Ultram for pain  -Will run Epifix benefits  -Recheck 1 week          This document has been electronically signed  by Estuardo Fried DPM on April 6, 2017 10:18 PM     Estuardo Fried DPM  4/6/2017  10:18 PM

## 2017-04-17 ENCOUNTER — HOSPITAL ENCOUNTER (INPATIENT)
Facility: HOSPITAL | Age: 57
LOS: 8 days | Discharge: ANOTHER HOSPITAL | End: 2017-04-25
Attending: INTERNAL MEDICINE | Admitting: INTERNAL MEDICINE

## 2017-04-17 ENCOUNTER — OFFICE VISIT (OUTPATIENT)
Dept: PODIATRY | Facility: CLINIC | Age: 57
End: 2017-04-17

## 2017-04-17 ENCOUNTER — APPOINTMENT (OUTPATIENT)
Dept: MRI IMAGING | Facility: HOSPITAL | Age: 57
End: 2017-04-17

## 2017-04-17 VITALS — HEIGHT: 70 IN | BODY MASS INDEX: 31.5 KG/M2 | WEIGHT: 220 LBS

## 2017-04-17 DIAGNOSIS — L97.522 FOOT ULCER, LEFT, WITH FAT LAYER EXPOSED (HCC): Primary | ICD-10-CM

## 2017-04-17 DIAGNOSIS — Z89.422 TOE AMPUTATION STATUS, LEFT: ICD-10-CM

## 2017-04-17 DIAGNOSIS — L03.116 CELLULITIS OF FOOT WITHOUT TOES, LEFT: ICD-10-CM

## 2017-04-17 DIAGNOSIS — M86.172 OSTEOMYELITIS OF ANKLE OR FOOT, ACUTE, LEFT (HCC): Primary | ICD-10-CM

## 2017-04-17 DIAGNOSIS — Z89.431 S/P TRANSMETATARSAL AMPUTATION OF FOOT, RIGHT (HCC): ICD-10-CM

## 2017-04-17 PROBLEM — M86.179 OSTEOMYELITIS OF ANKLE OR FOOT, ACUTE (HCC): Status: ACTIVE | Noted: 2017-04-17

## 2017-04-17 LAB
ANION GAP SERPL CALCULATED.3IONS-SCNC: 11 MMOL/L (ref 5–15)
BASOPHILS # BLD AUTO: 0.02 10*3/MM3 (ref 0–0.2)
BASOPHILS NFR BLD AUTO: 0.2 % (ref 0–2)
BUN BLD-MCNC: 16 MG/DL (ref 7–21)
BUN/CREAT SERPL: 19.3 (ref 7–25)
CALCIUM SPEC-SCNC: 8.9 MG/DL (ref 8.4–10.2)
CHLORIDE SERPL-SCNC: 94 MMOL/L (ref 95–110)
CO2 SERPL-SCNC: 26 MMOL/L (ref 22–31)
CREAT BLD-MCNC: 0.83 MG/DL (ref 0.7–1.3)
CRP SERPL-MCNC: 1.4 MG/DL (ref 0–1)
D-LACTATE SERPL-SCNC: 0.7 MMOL/L (ref 0.5–2)
D-LACTATE SERPL-SCNC: 0.9 MMOL/L (ref 0.5–2)
D-LACTATE SERPL-SCNC: 3 MMOL/L (ref 0.5–2)
DEPRECATED RDW RBC AUTO: 43 FL (ref 35.1–43.9)
EOSINOPHIL # BLD AUTO: 0.16 10*3/MM3 (ref 0–0.7)
EOSINOPHIL NFR BLD AUTO: 1.9 % (ref 0–7)
ERYTHROCYTE [DISTWIDTH] IN BLOOD BY AUTOMATED COUNT: 12.4 % (ref 11.5–14.5)
GFR SERPL CREATININE-BSD FRML MDRD: 95 ML/MIN/1.73 (ref 56–130)
GLUCOSE BLD-MCNC: 73 MG/DL (ref 60–100)
HCT VFR BLD AUTO: 39.4 % (ref 39–49)
HGB BLD-MCNC: 14 G/DL (ref 13.7–17.3)
IMM GRANULOCYTES # BLD: 0.02 10*3/MM3 (ref 0–0.02)
IMM GRANULOCYTES NFR BLD: 0.2 % (ref 0–0.5)
LYMPHOCYTES # BLD AUTO: 2.04 10*3/MM3 (ref 0.6–4.2)
LYMPHOCYTES NFR BLD AUTO: 23.7 % (ref 10–50)
MCH RBC QN AUTO: 33.7 PG (ref 26.5–34)
MCHC RBC AUTO-ENTMCNC: 35.5 G/DL (ref 31.5–36.3)
MCV RBC AUTO: 94.7 FL (ref 80–98)
MONOCYTES # BLD AUTO: 1.2 10*3/MM3 (ref 0–0.9)
MONOCYTES NFR BLD AUTO: 14 % (ref 0–12)
NEUTROPHILS # BLD AUTO: 5.16 10*3/MM3 (ref 2–8.6)
NEUTROPHILS NFR BLD AUTO: 60 % (ref 37–80)
PLATELET # BLD AUTO: 151 10*3/MM3 (ref 150–450)
PMV BLD AUTO: 10.5 FL (ref 8–12)
POTASSIUM BLD-SCNC: 4.6 MMOL/L (ref 3.5–5.1)
RBC # BLD AUTO: 4.16 10*6/MM3 (ref 4.37–5.74)
SODIUM BLD-SCNC: 131 MMOL/L (ref 137–145)
WBC NRBC COR # BLD: 8.6 10*3/MM3 (ref 3.2–9.8)
WHOLE BLOOD HOLD SPECIMEN: NORMAL

## 2017-04-17 PROCEDURE — 85025 COMPLETE CBC W/AUTO DIFF WBC: CPT | Performed by: NURSE PRACTITIONER

## 2017-04-17 PROCEDURE — 87070 CULTURE OTHR SPECIMN AEROBIC: CPT | Performed by: NURSE PRACTITIONER

## 2017-04-17 PROCEDURE — 25010000002 PIPERACILLIN SOD-TAZOBACTAM PER 1 G: Performed by: NURSE PRACTITIONER

## 2017-04-17 PROCEDURE — 87147 CULTURE TYPE IMMUNOLOGIC: CPT | Performed by: NURSE PRACTITIONER

## 2017-04-17 PROCEDURE — 87040 BLOOD CULTURE FOR BACTERIA: CPT | Performed by: NURSE PRACTITIONER

## 2017-04-17 PROCEDURE — 83605 ASSAY OF LACTIC ACID: CPT | Performed by: NURSE PRACTITIONER

## 2017-04-17 PROCEDURE — 87077 CULTURE AEROBIC IDENTIFY: CPT | Performed by: NURSE PRACTITIONER

## 2017-04-17 PROCEDURE — 80048 BASIC METABOLIC PNL TOTAL CA: CPT | Performed by: NURSE PRACTITIONER

## 2017-04-17 PROCEDURE — 11424 EXC H-F-NK-SP B9+MARG 3.1-4: CPT | Performed by: PODIATRIST

## 2017-04-17 PROCEDURE — 83605 ASSAY OF LACTIC ACID: CPT | Performed by: INTERNAL MEDICINE

## 2017-04-17 PROCEDURE — 87150 DNA/RNA AMPLIFIED PROBE: CPT | Performed by: NURSE PRACTITIONER

## 2017-04-17 PROCEDURE — 25010000002 ENOXAPARIN PER 10 MG: Performed by: NURSE PRACTITIONER

## 2017-04-17 PROCEDURE — 28805 AMPUTATION THRU METATARSAL: CPT | Performed by: PODIATRIST

## 2017-04-17 PROCEDURE — 27606 INCISION OF ACHILLES TENDON: CPT | Performed by: PODIATRIST

## 2017-04-17 PROCEDURE — 25010000002 MORPHINE SULFATE (PF) 2 MG/ML SOLUTION: Performed by: FAMILY MEDICINE

## 2017-04-17 PROCEDURE — 86140 C-REACTIVE PROTEIN: CPT | Performed by: NURSE PRACTITIONER

## 2017-04-17 PROCEDURE — 87205 SMEAR GRAM STAIN: CPT | Performed by: NURSE PRACTITIONER

## 2017-04-17 PROCEDURE — 87186 SC STD MICRODIL/AGAR DIL: CPT | Performed by: NURSE PRACTITIONER

## 2017-04-17 PROCEDURE — 25010000002 VANCOMYCIN PER 500 MG: Performed by: NURSE PRACTITIONER

## 2017-04-17 RX ORDER — SODIUM CHLORIDE 0.9 % (FLUSH) 0.9 %
1-10 SYRINGE (ML) INJECTION AS NEEDED
Status: DISCONTINUED | OUTPATIENT
Start: 2017-04-17 | End: 2017-04-25 | Stop reason: HOSPADM

## 2017-04-17 RX ORDER — DOCUSATE SODIUM 100 MG/1
100 CAPSULE, LIQUID FILLED ORAL 2 TIMES DAILY
Status: DISCONTINUED | OUTPATIENT
Start: 2017-04-17 | End: 2017-04-25 | Stop reason: HOSPADM

## 2017-04-17 RX ORDER — SODIUM CHLORIDE 9 MG/ML
75 INJECTION, SOLUTION INTRAVENOUS CONTINUOUS
Status: DISCONTINUED | OUTPATIENT
Start: 2017-04-17 | End: 2017-04-20

## 2017-04-17 RX ORDER — MORPHINE SULFATE 2 MG/ML
2 INJECTION, SOLUTION INTRAMUSCULAR; INTRAVENOUS EVERY 4 HOURS PRN
Status: DISCONTINUED | OUTPATIENT
Start: 2017-04-17 | End: 2017-04-25 | Stop reason: HOSPADM

## 2017-04-17 RX ORDER — MORPHINE SULFATE 2 MG/ML
1 INJECTION, SOLUTION INTRAMUSCULAR; INTRAVENOUS EVERY 4 HOURS PRN
Status: DISCONTINUED | OUTPATIENT
Start: 2017-04-17 | End: 2017-04-17

## 2017-04-17 RX ORDER — ONDANSETRON 4 MG/1
4 TABLET, FILM COATED ORAL EVERY 6 HOURS PRN
Status: DISCONTINUED | OUTPATIENT
Start: 2017-04-17 | End: 2017-04-25 | Stop reason: HOSPADM

## 2017-04-17 RX ORDER — HYDROCHLOROTHIAZIDE 25 MG/1
25 TABLET ORAL DAILY
Status: DISCONTINUED | OUTPATIENT
Start: 2017-04-17 | End: 2017-04-25 | Stop reason: HOSPADM

## 2017-04-17 RX ORDER — LISINOPRIL 20 MG/1
20 TABLET ORAL
Status: DISCONTINUED | OUTPATIENT
Start: 2017-04-17 | End: 2017-04-25 | Stop reason: HOSPADM

## 2017-04-17 RX ORDER — PANTOPRAZOLE SODIUM 40 MG/1
40 TABLET, DELAYED RELEASE ORAL
Status: DISCONTINUED | OUTPATIENT
Start: 2017-04-18 | End: 2017-04-25 | Stop reason: HOSPADM

## 2017-04-17 RX ORDER — MORPHINE SULFATE 2 MG/ML
1 INJECTION, SOLUTION INTRAMUSCULAR; INTRAVENOUS EVERY 4 HOURS PRN
Status: DISCONTINUED | OUTPATIENT
Start: 2017-04-17 | End: 2017-04-25 | Stop reason: HOSPADM

## 2017-04-17 RX ORDER — ACETAMINOPHEN 325 MG/1
650 TABLET ORAL EVERY 4 HOURS PRN
Status: DISCONTINUED | OUTPATIENT
Start: 2017-04-17 | End: 2017-04-19

## 2017-04-17 RX ADMIN — TAZOBACTAM SODIUM AND PIPERACILLIN SODIUM 3.38 G: 375; 3 INJECTION, SOLUTION INTRAVENOUS at 20:12

## 2017-04-17 RX ADMIN — MORPHINE SULFATE 2 MG: 2 INJECTION, SOLUTION INTRAMUSCULAR; INTRAVENOUS at 17:54

## 2017-04-17 RX ADMIN — VANCOMYCIN HYDROCHLORIDE 1750 MG: 500 INJECTION, POWDER, LYOPHILIZED, FOR SOLUTION INTRAVENOUS at 20:52

## 2017-04-17 RX ADMIN — SODIUM CHLORIDE 75 ML/HR: 9 INJECTION, SOLUTION INTRAVENOUS at 20:11

## 2017-04-17 RX ADMIN — ENOXAPARIN SODIUM 40 MG: 40 INJECTION SUBCUTANEOUS at 20:11

## 2017-04-17 RX ADMIN — Medication 10 ML: at 20:12

## 2017-04-17 NOTE — H&P
[facility] Medicine Admission      Date of Admission: 4/17/2017      Primary Care Physician: BROOKLYNN Gu      Chief Complaint: Foot ulcer    HPI: 56-year-old  male who presented as a direct admission from Dr. Fried's office related to chronic foot ulcers.  The patient recently underwent surgery to amputate his left great toe related to his chronic ulcerations from poor circulation.  The patient has a history of stroke and has difficulty with his speech so history is slightly difficult to obtain however I am able to gather that the patient went to Dr. Fried's office for follow-up and wounds are not healing as expected.  He has been direct admitted to rule out osteomyelitis and receive IV antibiotic therapy.    Concurrent Medical History:  has a past medical history of Hypertension and Stroke.    Past Surgical History: Tonsillectomy, amputation of all toes on right foot, great toe amputation on left foot    Family History: Patient states that he is unsure of his family history.    Social History: Patient denies alcohol or illicit drug use.  Patient is a former smoker of at least a pack a day for 30+ years.  He reports quitting 5 months ago.    Allergies: No Known Allergies    Medications: Scheduled Meds:  docusate sodium 100 mg Oral BID   enoxaparin 40 mg Subcutaneous Daily   hydrochlorothiazide 25 mg Oral Daily   lisinopril 20 mg Oral Q24H   [START ON 4/18/2017] pantoprazole 40 mg Oral Q AM   piperacillin-tazobactam 3.375 g Intravenous Q6H     Continuous Infusions:  Pharmacy to dose vancomycin    sodium chloride 75 mL/hr     PRN Meds:.•  acetaminophen  •  Morphine  •  Morphine  •  ondansetron  •  Pharmacy to dose vancomycin  •  sodium chloride    Review of Systems:  Review of Systems   Constitutional: Negative for fever.   Respiratory: Negative for shortness of breath.    Cardiovascular: Negative for chest pain.   Gastrointestinal: Negative for nausea  and vomiting.   Musculoskeletal: Negative for back pain and neck pain.   Skin: Positive for wound.        Wounds to left second toe, lateral aspect of left foot, and on bottom of patient's foot with sanguinous and purulent drainage noted to the patient's dressing.   Neurological: Negative for weakness.   Psychiatric/Behavioral: Negative for confusion.      Review of systems is difficult to obtain due to patient's aphasia related to CVA.    Physical Exam:   Temp:  [98 °F (36.7 °C)] 98 °F (36.7 °C)  Heart Rate:  [75] 75  Resp:  [18] 18  BP: (127)/(59) 127/59  Physical Exam   Constitutional: He is oriented to person, place, and time. He appears well-developed and well-nourished.   HENT:   Head: Normocephalic.   Eyes: Conjunctivae are normal.   Neck: Neck supple.   Cardiovascular: Normal rate and normal heart sounds.    Pulmonary/Chest: Effort normal and breath sounds normal. No respiratory distress. He has no wheezes. He has no rales. He exhibits no tenderness.   Abdominal: Soft. Bowel sounds are normal. He exhibits no distension. There is no tenderness.   Musculoskeletal: Normal range of motion.        Neurological: He is alert and oriented to person, place, and time.   Skin: Skin is warm.   Foot ulceration x3 with sanguinous and purulent drainage noted.    Psychiatric: He has a normal mood and affect. His behavior is normal.   Vitals reviewed.        Results Reviewed:  I have personally reviewed current lab, radiology, and data and agree with results.  Lab Results (last 24 hours)     ** No results found for the last 24 hours. **        Imaging Results (last 24 hours)     ** No results found for the last 24 hours. **            Assessment:    Hospital Problem List     Osteomyelitis of ankle or foot, acute                Plan:  #1 chronic foot ulcerations/possible osteomyelitis: Podiatry as been consult.  Wound and blood cultures have been obtained.  MRI will be performed to rule out osteomyelitis.  Vancomycin per  pharmacy dosing as well as Zosyn have been ordered for broad-spectrum coverage.  Wound Center consult was also been placed.  #2 hypertension: Continue patient's home doses of lisinopril and HCTZ.  #3 history of CVA    Protonix for GI prophylaxis and Lovenox for DVT prophylaxis.    I discussed the patients findings and my recommendations with the patient.  Further orders on this patient will depend upon the hospital course.  Approximately 45 minutes was spent on the admission process for this patient.  Code status was discussed with the patient and he wishes to be DNR code status.    Khushbu Santos, BROOKLYNN  04/17/17  5:25 PM

## 2017-04-17 NOTE — PROGRESS NOTES
Damian Wild  1960  56 y.o. male   Patient presents today for a wound check to the left foot.     04/17/2017    Chief Complaint   Patient presents with   • Right Foot - Follow-up   • Left Foot - Follow-up           History of Present Illness    Mr Wild is a 57 y/o male who underwent open left hallux amputation on 12/27 with revision to partial 1st ray and closure on 12/29. He has healed his amputation site, but now with left plantar and lateral foot wounds. He has been seeing us weekly for continued wound care, however he did miss his last week's appointment.  He has been experiencing increased pain to his left foot wound sites, which continues to worsen despite a two-week course of clindamycin and Ultram for pain control.  He denies any nausea, vomiting, fever or chills.    Past Medical History:   Diagnosis Date   • Hypertension    • Stroke          Past Surgical History:   Procedure Laterality Date   • AMPUTATION DIGIT Left 4/19/2017    Procedure: LEFT FOOT TRANSMETATARSAL AMPUTATION, TENDO ACHILLES LENGTHENING.;  Surgeon: Estuardo Fried DPM;  Location: [facility];  Service:    • TOE AMPUTATION Right     All toes removed on right foot   • TOE AMPUTATION Left     great toe removed   • TONSILLECTOMY           No family history on file.      Social History     Social History   • Marital status:      Spouse name: N/A   • Number of children: N/A   • Years of education: N/A     Occupational History   • Not on file.     Social History Main Topics   • Smoking status: Current Some Day Smoker   • Smokeless tobacco: Not on file      Comment: one cigarette a month   • Alcohol use No   • Drug use: No   • Sexual activity: Defer     Other Topics Concern   • Not on file     Social History Narrative         No current facility-administered medications for this visit.      Current Outpatient Prescriptions   Medication Sig Dispense Refill   • cefTRIAXone (ROCEPHIN) 1 G injection Infuse 2 g into a venous  catheter Daily. 20 g 0   • docusate sodium 100 MG capsule Take 100 mg by mouth 2 (Two) Times a Day. 30 capsule 1   • HYDROcodone-acetaminophen (NORCO) 7.5-325 MG per tablet Take 1 tablet by mouth Every 6 (Six) Hours As Needed for Moderate Pain (4-6) for up to 8 days. 30 tablet 0   • vancomycin 1,750 mg in sodium chloride 0.9 % 500 mL IVPB Infuse 1,750 mg into a venous catheter Every 12 (Twelve) Hours for 10 days. Indications: Bone and/or Joint Infection 3400 g 0     Facility-Administered Medications Ordered in Other Visits   Medication Dose Route Frequency Provider Last Rate Last Dose   • bupivacaine (PF) (MARCAINE) 0.5 % injection    PRN Estuardo Fried DPM   28 mL at 04/19/17 1146   • cefTRIAXone (ROCEPHIN) 1 g/100 mL 0.9% NS (MBP)  1 g Intravenous Q24H Shayan Morel MD   1 g at 04/22/17 1808   • docusate sodium (COLACE) capsule 100 mg  100 mg Oral BID Khushbu Santos APRN   100 mg at 04/23/17 0833   • enoxaparin (LOVENOX) syringe 40 mg  40 mg Subcutaneous Daily Khushbu Santos APRN   40 mg at 04/17/17 2011   • hydrochlorothiazide (HYDRODIURIL) tablet 25 mg  25 mg Oral Daily Khushbu Santos APRN   25 mg at 04/23/17 0833   • HYDROcodone-acetaminophen (NORCO) 7.5-325 MG per tablet 1 tablet  1 tablet Oral Q6H PRN Estuardo Fried DPM   1 tablet at 04/23/17 1148   • lisinopril (PRINIVIL,ZESTRIL) tablet 20 mg  20 mg Oral Q24H Khushbu Santos APRN   20 mg at 04/23/17 0833   • Morphine sulfate (PF) injection 1 mg  1 mg Intravenous Q4H PRN Alec Falcon MD   1 mg at 04/18/17 1930   • Morphine sulfate (PF) injection 2 mg  2 mg Intravenous Q4H PRN Alec Falcon MD   2 mg at 04/23/17 0521   • ondansetron (ZOFRAN) tablet 4 mg  4 mg Oral Q6H PRN Khushbu Santos, APRN       • pantoprazole (PROTONIX) EC tablet 40 mg  40 mg Oral Q AM BROOKLYNN Szymanski   40 mg at 04/23/17 0614   • Pharmacy to dose vancomycin   Does not apply Continuous PRN BROOKLYNN Szymanski       • sodium chloride 0.9 % flush 1-10 mL  1-10 mL  "Intravenous PRN Khushbu SantosBROOKLYNN   10 mL at 04/20/17 0326   • sodium chloride 0.9 % flush 10 mL  10 mL Intravenous Q12H Nakul Sun MD   10 mL at 04/23/17 0833   • vancomycin (VANCOCIN) 1,500 mg in sodium chloride 0.9 % 500 mL IVPB  1,500 mg Intravenous Q12H Nakul Sun MD   1,500 mg at 04/23/17 0833         OBJECTIVE    Ht 70\" (177.8 cm)  Wt 220 lb (99.8 kg)  BMI 31.57 kg/m2      Review of Systems   Constitutional:  Denies recent weight loss, weight gain, fever or chills, no change in exercise tolerance  Musculoskeletal: Foot pain. H/o toe amputations   Skin:  Thickened nails. Bilateral foot wounds.  Neurological:  Burning sensations to feet b/l.  Psychiatric/Behavioral: Denies depression    Physical Exam   Constitutional: he appears well-developed and well-nourished.   HEENT: Normocephalic. Atraumatic.  CV: No CP. RRR  Resp: Non-labored respirations.  Psychiatric: he has a normal mood and affect. his behavior is normal.     Lower Extremity Exam:  Vascular: DP/PT pulses palpable 1+.   Negative hair growth.   Minimal left foot edema  Feel cool to touch  Neuro: Protective sensation absent, b/l.  DTRs intact  Integument:   Atrophic skin noted b/l  Left partial 1st ray incision now healed  Left lateral 5th met base 3.0 x 2.0 x 0.3 with fibrogranular base. No fluctuance. No drainage, mild periwound erythema +pain on palpation  Dorsal right ankle FT ulceration 0.8 x 0.6 x 0.3 cm with fibronecrotic base.  Plantar 2nd mpj ulceration on left. 3.0 x 1.6 x 0.3 cm with fibronecrotic base and surrounding HPK. No SOI. No PTB  New edema to left 2nd toe with pinpoint ulceration and active drainage. +significant pain on palpation.  Musculoskeletal: LE muscle strength 5/5.   Gait  normal  Semi-rigid hammertoe deformity toes 2-5 on left  Nails 2-5 on left thickened, elongated with subungual debris. +pain on palpation  Right TMA        ASSESSMENT AND PLAN    Damian was seen today for follow-up and " follow-up.    Diagnoses and all orders for this visit:    Foot ulcer, left, with fat layer exposed    Toe amputation status, left    S/P transmetatarsal amputation of foot, right    Cellulitis of foot without toes, left    -Comprehensive foot and ankle exam performed  -Continues to complain of persistent, worsening pain.  He now presents with an edematous second digit with likely underlying abscess formation, despite PO Clindamycin treatment.  He is not showing any constitutional signs of infection at this time.  However given his worsening pain and wound appearance I did recommend that he go to [facility] for admission and further workup with IV antibiotics and MRI.  -Did contact Dr. LE EANN Sun who will accept admission.  I will continue to follow him in the hospital.            This document has been electronically signed by Estuardo Fried DPM on April 23, 2017 1:18 PM     Estuardo Fried DPM  4/23/2017  1:18 PM

## 2017-04-18 ENCOUNTER — APPOINTMENT (OUTPATIENT)
Dept: MRI IMAGING | Facility: HOSPITAL | Age: 57
End: 2017-04-18

## 2017-04-18 LAB
ANION GAP SERPL CALCULATED.3IONS-SCNC: 9 MMOL/L (ref 5–15)
BACTERIA BLD CULT: ABNORMAL
BASOPHILS # BLD AUTO: 0.02 10*3/MM3 (ref 0–0.2)
BASOPHILS NFR BLD AUTO: 0.3 % (ref 0–2)
BUN BLD-MCNC: 15 MG/DL (ref 7–21)
BUN/CREAT SERPL: 17.4 (ref 7–25)
CALCIUM SPEC-SCNC: 8.5 MG/DL (ref 8.4–10.2)
CHLORIDE SERPL-SCNC: 99 MMOL/L (ref 95–110)
CO2 SERPL-SCNC: 30 MMOL/L (ref 22–31)
CREAT BLD-MCNC: 0.86 MG/DL (ref 0.7–1.3)
CRP SERPL-MCNC: 1.6 MG/DL (ref 0–1)
DEPRECATED RDW RBC AUTO: 44.1 FL (ref 35.1–43.9)
EOSINOPHIL # BLD AUTO: 0.33 10*3/MM3 (ref 0–0.7)
EOSINOPHIL NFR BLD AUTO: 5.1 % (ref 0–7)
ERYTHROCYTE [DISTWIDTH] IN BLOOD BY AUTOMATED COUNT: 12.8 % (ref 11.5–14.5)
GFR SERPL CREATININE-BSD FRML MDRD: 91 ML/MIN/1.73 (ref 56–130)
GLUCOSE BLD-MCNC: 79 MG/DL (ref 60–100)
HCT VFR BLD AUTO: 40.7 % (ref 39–49)
HGB BLD-MCNC: 13.9 G/DL (ref 13.7–17.3)
IMM GRANULOCYTES # BLD: 0.01 10*3/MM3 (ref 0–0.02)
IMM GRANULOCYTES NFR BLD: 0.2 % (ref 0–0.5)
LYMPHOCYTES # BLD AUTO: 2.12 10*3/MM3 (ref 0.6–4.2)
LYMPHOCYTES NFR BLD AUTO: 32.5 % (ref 10–50)
MCH RBC QN AUTO: 32.6 PG (ref 26.5–34)
MCHC RBC AUTO-ENTMCNC: 34.2 G/DL (ref 31.5–36.3)
MCV RBC AUTO: 95.5 FL (ref 80–98)
MONOCYTES # BLD AUTO: 1.17 10*3/MM3 (ref 0–0.9)
MONOCYTES NFR BLD AUTO: 17.9 % (ref 0–12)
NEUTROPHILS # BLD AUTO: 2.87 10*3/MM3 (ref 2–8.6)
NEUTROPHILS NFR BLD AUTO: 44 % (ref 37–80)
PLATELET # BLD AUTO: 135 10*3/MM3 (ref 150–450)
PMV BLD AUTO: 10.7 FL (ref 8–12)
POTASSIUM BLD-SCNC: 4.7 MMOL/L (ref 3.5–5.1)
RBC # BLD AUTO: 4.26 10*6/MM3 (ref 4.37–5.74)
SODIUM BLD-SCNC: 138 MMOL/L (ref 137–145)
WBC NRBC COR # BLD: 6.52 10*3/MM3 (ref 3.2–9.8)

## 2017-04-18 PROCEDURE — 25010000002 PIPERACILLIN SOD-TAZOBACTAM PER 1 G: Performed by: NURSE PRACTITIONER

## 2017-04-18 PROCEDURE — 25010000002 GADOTERIDOL PER 1 ML: Performed by: INTERNAL MEDICINE

## 2017-04-18 PROCEDURE — 85025 COMPLETE CBC W/AUTO DIFF WBC: CPT | Performed by: NURSE PRACTITIONER

## 2017-04-18 PROCEDURE — 73720 MRI LWR EXTREMITY W/O&W/DYE: CPT

## 2017-04-18 PROCEDURE — 80048 BASIC METABOLIC PNL TOTAL CA: CPT | Performed by: NURSE PRACTITIONER

## 2017-04-18 PROCEDURE — 99232 SBSQ HOSP IP/OBS MODERATE 35: CPT | Performed by: PODIATRIST

## 2017-04-18 PROCEDURE — 25010000002 MORPHINE SULFATE (PF) 2 MG/ML SOLUTION: Performed by: FAMILY MEDICINE

## 2017-04-18 PROCEDURE — 25010000002 VANCOMYCIN PER 500 MG: Performed by: INTERNAL MEDICINE

## 2017-04-18 PROCEDURE — 86140 C-REACTIVE PROTEIN: CPT | Performed by: NURSE PRACTITIONER

## 2017-04-18 PROCEDURE — 25010000002 LORAZEPAM PER 2 MG: Performed by: PODIATRIST

## 2017-04-18 PROCEDURE — A9576 INJ PROHANCE MULTIPACK: HCPCS | Performed by: INTERNAL MEDICINE

## 2017-04-18 RX ORDER — LORAZEPAM 2 MG/ML
1 INJECTION INTRAMUSCULAR ONCE
Status: COMPLETED | OUTPATIENT
Start: 2017-04-18 | End: 2017-04-18

## 2017-04-18 RX ADMIN — LORAZEPAM 1 MG: 2 INJECTION INTRAMUSCULAR; INTRAVENOUS at 12:35

## 2017-04-18 RX ADMIN — VANCOMYCIN HYDROCHLORIDE 2000 MG: 1 INJECTION, POWDER, LYOPHILIZED, FOR SOLUTION INTRAVENOUS at 13:39

## 2017-04-18 RX ADMIN — MORPHINE SULFATE 2 MG: 2 INJECTION, SOLUTION INTRAMUSCULAR; INTRAVENOUS at 00:32

## 2017-04-18 RX ADMIN — MORPHINE SULFATE 1 MG: 2 INJECTION, SOLUTION INTRAMUSCULAR; INTRAVENOUS at 19:30

## 2017-04-18 RX ADMIN — GADOTERIDOL 20 ML: 279.3 INJECTION, SOLUTION INTRAVENOUS at 14:00

## 2017-04-18 RX ADMIN — TAZOBACTAM SODIUM AND PIPERACILLIN SODIUM 3.38 G: 375; 3 INJECTION, SOLUTION INTRAVENOUS at 05:54

## 2017-04-18 RX ADMIN — DOCUSATE SODIUM 100 MG: 100 CAPSULE, LIQUID FILLED ORAL at 17:14

## 2017-04-18 RX ADMIN — DOCUSATE SODIUM 100 MG: 100 CAPSULE, LIQUID FILLED ORAL at 08:47

## 2017-04-18 RX ADMIN — LISINOPRIL 20 MG: 20 TABLET ORAL at 08:47

## 2017-04-18 RX ADMIN — PANTOPRAZOLE SODIUM 40 MG: 40 TABLET, DELAYED RELEASE ORAL at 05:53

## 2017-04-18 RX ADMIN — MORPHINE SULFATE 2 MG: 2 INJECTION, SOLUTION INTRAMUSCULAR; INTRAVENOUS at 13:35

## 2017-04-18 RX ADMIN — MORPHINE SULFATE 2 MG: 2 INJECTION, SOLUTION INTRAMUSCULAR; INTRAVENOUS at 05:54

## 2017-04-18 RX ADMIN — TAZOBACTAM SODIUM AND PIPERACILLIN SODIUM 3.38 G: 375; 3 INJECTION, SOLUTION INTRAVENOUS at 17:14

## 2017-04-18 RX ADMIN — SODIUM CHLORIDE 75 ML/HR: 9 INJECTION, SOLUTION INTRAVENOUS at 19:14

## 2017-04-18 RX ADMIN — TAZOBACTAM SODIUM AND PIPERACILLIN SODIUM 3.38 G: 375; 3 INJECTION, SOLUTION INTRAVENOUS at 00:32

## 2017-04-18 RX ADMIN — HYDROCHLOROTHIAZIDE 25 MG: 25 TABLET ORAL at 08:47

## 2017-04-18 RX ADMIN — TAZOBACTAM SODIUM AND PIPERACILLIN SODIUM 3.38 G: 375; 3 INJECTION, SOLUTION INTRAVENOUS at 23:43

## 2017-04-18 NOTE — PROGRESS NOTES
[facility] Medicine Services  INPATIENT PROGRESS NOTE    Length of Stay: 1  Date of Admission: 4/17/2017  Primary Care Physician: BROOKLYNN Gu    Subjective   Chief Complaint: left foot pain and chronic ulcers  HPI:  56-year-old  male who presented as a direct admission from Dr. Fried's office related to chronic foot ulcers. The patient recently underwent surgery to amputate his left great toe related to his chronic ulcerations from poor circulation. The patient has a history of stroke and has difficulty with his speech so history is slightly difficult to obtain however I am able to gather that the patient went to Dr. Fried's office for follow-up and wounds are not healing as expected. He has been direct admitted to rule out osteomyelitis and receive IV antibiotic therapy.    Review of Systems   Constitutional: Negative for chills and fever.   Respiratory: Negative for cough, chest tightness and shortness of breath.    Cardiovascular: Negative for chest pain, palpitations and leg swelling.   Gastrointestinal: Negative for constipation, diarrhea, nausea and vomiting.   Musculoskeletal: Negative for back pain and neck pain.   Skin: Positive for wound. Negative for pallor.        Ulceration x3 to left foot    Neurological: Negative for dizziness and weakness.   Psychiatric/Behavioral: Negative for confusion.        All pertinent negatives and positives are as above. All other systems have been reviewed and are negative unless otherwise stated.     Objective    Temp:  [96.6 °F (35.9 °C)-98.2 °F (36.8 °C)] 97.2 °F (36.2 °C)  Heart Rate:  [49-75] 62  Resp:  [18] 18  BP: (126-148)/(59-72) 126/64    Physical Exam   Constitutional: He is oriented to person, place, and time. He appears well-developed and well-nourished.   HENT:   Head: Normocephalic.   Eyes: Conjunctivae are normal.   Neck: Neck supple.   Cardiovascular: Normal rate, regular rhythm, normal heart  sounds and intact distal pulses.    Pulmonary/Chest: Effort normal and breath sounds normal.   Abdominal: Soft. Bowel sounds are normal. He exhibits no distension. There is no tenderness.   Musculoskeletal: Normal range of motion.   Neurological: He is alert and oriented to person, place, and time.   Skin: Skin is warm and dry.   Psychiatric: He has a normal mood and affect. His behavior is normal.   Vitals reviewed.          Results Review:  I have reviewed the labs, radiology results, and diagnostic studies.    Laboratory Data:     Results from last 7 days  Lab Units 04/18/17  0529 04/17/17  1741   SODIUM mmol/L 138 131*   POTASSIUM mmol/L 4.7 4.6   CHLORIDE mmol/L 99 94*   TOTAL CO2 mmol/L 30.0 26.0   BUN mg/dL 15 16   CREATININE mg/dL 0.86 0.83   GLUCOSE mg/dL 79 73   CALCIUM mg/dL 8.5 8.9   ANION GAP mmol/L 9.0 11.0     Estimated Creatinine Clearance: 111.5 mL/min (by C-G formula based on Cr of 0.86).            Results from last 7 days  Lab Units 04/18/17  0529 04/17/17  1741   WBC 10*3/mm3 6.52 8.60   HEMOGLOBIN g/dL 13.9 14.0   HEMATOCRIT % 40.7 39.4   PLATELETS 10*3/mm3 135* 151           Culture Data:   Blood Culture   Date Value Ref Range Status   04/17/2017 No growth at less than 24 hours  Preliminary   04/17/2017 No growth at less than 24 hours  Preliminary     No results found for: URINECX  No results found for: RESPCX  Wound Culture   Date Value Ref Range Status   04/17/2017 Light growth (2+) Gram Negative Bacilli (A)  Preliminary     No results found for: STOOLCX  No components found for: BODYFLD    Radiology Data:   Imaging Results (last 24 hours)     ** No results found for the last 24 hours. **          I have reviewed the patient current medications.     Assessment/Plan     Assessment/Plan:    #1 chronic left foot ulcerations/possible osteomyelitis: Podiatry has been consulted.  It is likely that patient may need further amputation. Wound and blood cultures have been obtained and light growth of  gram negative bacilli. Unable to complete MRI due to motion artifact. Vancomycin per pharmacy dosing as well as Zosyn have been ordered for broad-spectrum coverage. Wound Center consult was also been placed.  #2 hypertension: Continue patient's home doses of lisinopril and HCTZ.  #3 history of CVA     Protonix for GI prophylaxis and Lovenox for DVT prophylaxis.      BROOKLYNN Szymanski   04/18/17   1:28 PM

## 2017-04-18 NOTE — PLAN OF CARE
Problem: Patient Care Overview (Adult)  Goal: Plan of Care Review  Outcome: Ongoing (interventions implemented as appropriate)    04/18/17 8375   Coping/Psychosocial Response Interventions   Plan Of Care Reviewed With patient   Patient Care Overview   Progress improving   Outcome Evaluation   Outcome Summary/Follow up Plan pt vss. Pt has MRI today. Working on pain managment       Goal: Adult Individualization and Mutuality  Outcome: Ongoing (interventions implemented as appropriate)  Goal: Discharge Needs Assessment  Outcome: Ongoing (interventions implemented as appropriate)    Problem: Sepsis (Adult)  Goal: Signs and Symptoms of Listed Potential Problems Will be Absent or Manageable (Sepsis)  Outcome: Ongoing (interventions implemented as appropriate)    Problem: Pain, Acute (Adult)  Goal: Acceptable Pain Control/Comfort Level  Outcome: Ongoing (interventions implemented as appropriate)

## 2017-04-18 NOTE — PROGRESS NOTES
"Pharmacokinetics by Pharmacy - Vancomycin    Damian Wild is a 56 y.o. male   [Ht: 70\" (177.8 cm); Wt: 222 lb (101 kg)]    Estimated Creatinine Clearance: 111.5 mL/min (by C-G formula based on Cr of 0.86).   Lab Results   Component Value Date    CREATININE 0.86 04/18/2017    CREATININE 0.83 04/17/2017    CREATININE 0.9 12/31/2016    CREATININE 0.9 12/30/2016    CREATININE 0.9 12/29/2016      Lab Results   Component Value Date    WBC 6.52 04/18/2017    WBC 8.60 04/17/2017    WBC 8.5 12/31/2016      Temp Readings from Last 1 Encounters:   04/18/17 97.2 °F (36.2 °C)       Indication for use: osteomyelitis of ankle or foot     Baseline culture results:  Microbiology Results (last 10 days)       Procedure Component Value - Date/Time      Wound Culture [[id number]]  (Abnormal) Collected:  04/17/17 1753    Lab Status:  Preliminary result Specimen:  Wound from Foot, Left Updated:  04/18/17 0727     Wound Culture Light growth (2+) Gram Negative Bacilli (A)     Beta Lactamase --     Gram Stain Result Rare (1+) WBCs seen      Rare (1+) Gram positive cocci in pairs    Narrative:       Slide reviewed confirmed    Blood Culture [[id number]]  (Normal) Collected:  04/17/17 1731    Lab Status:  Preliminary result Specimen:  Blood from Arm, Right Updated:  04/18/17 0701     Blood Culture No growth at less than 24 hours               Assessment/Plan Day 1  Concurrent meds: Zosyn  Initiated Vancomycin 2000 mg IVPB Q12H. Will order Vancomycin trough level 4/19 @ 0900 prior to 4th dose. Pharmacy will monitor renal function and adjust dose accordingly.    Randall Tillman, [facility]  04/18/17 9:00 AM     "

## 2017-04-18 NOTE — NURSING NOTE
Talked to Dr Corky Calabrese nurse, and she said Dr Fried will write orders for patient's hospital care

## 2017-04-18 NOTE — PLAN OF CARE
Problem: Patient Care Overview (Adult)  Goal: Plan of Care Review  Outcome: Ongoing (interventions implemented as appropriate)    04/18/17 0304   Coping/Psychosocial Response Interventions   Plan Of Care Reviewed With patient   Patient Care Overview   Progress improving   Outcome Evaluation   Outcome Summary/Follow up Plan VSS, pain is controlled with meds, urine output is good, latic has want down at each draw       Goal: Adult Individualization and Mutuality  Outcome: Ongoing (interventions implemented as appropriate)  Goal: Discharge Needs Assessment  Outcome: Ongoing (interventions implemented as appropriate)    Problem: Sepsis (Adult)  Goal: Signs and Symptoms of Listed Potential Problems Will be Absent or Manageable (Sepsis)  Outcome: Ongoing (interventions implemented as appropriate)    Problem: Pain, Acute (Adult)  Goal: Identify Related Risk Factors and Signs and Symptoms  Outcome: Outcome(s) achieved Date Met:  04/18/17  Goal: Acceptable Pain Control/Comfort Level  Outcome: Ongoing (interventions implemented as appropriate)

## 2017-04-19 ENCOUNTER — ANESTHESIA (OUTPATIENT)
Dept: PERIOP | Facility: HOSPITAL | Age: 57
End: 2017-04-19

## 2017-04-19 ENCOUNTER — ANESTHESIA EVENT (OUTPATIENT)
Dept: PERIOP | Facility: HOSPITAL | Age: 57
End: 2017-04-19

## 2017-04-19 LAB
ANION GAP SERPL CALCULATED.3IONS-SCNC: 12 MMOL/L (ref 5–15)
BACTERIA SPEC AEROBE CULT: ABNORMAL
BASOPHILS # BLD AUTO: 0.03 10*3/MM3 (ref 0–0.2)
BASOPHILS NFR BLD AUTO: 0.5 % (ref 0–2)
BETA LACTAMASE: ABNORMAL
BUN BLD-MCNC: 14 MG/DL (ref 7–21)
BUN/CREAT SERPL: 15.7 (ref 7–25)
CALCIUM SPEC-SCNC: 8.1 MG/DL (ref 8.4–10.2)
CHLORIDE SERPL-SCNC: 101 MMOL/L (ref 95–110)
CO2 SERPL-SCNC: 29 MMOL/L (ref 22–31)
CREAT BLD-MCNC: 0.89 MG/DL (ref 0.7–1.3)
DEPRECATED RDW RBC AUTO: 46.4 FL (ref 35.1–43.9)
EOSINOPHIL # BLD AUTO: 0.43 10*3/MM3 (ref 0–0.7)
EOSINOPHIL NFR BLD AUTO: 7.4 % (ref 0–7)
ERYTHROCYTE [DISTWIDTH] IN BLOOD BY AUTOMATED COUNT: 13.1 % (ref 11.5–14.5)
GFR SERPL CREATININE-BSD FRML MDRD: 88 ML/MIN/1.73 (ref 60–130)
GLUCOSE BLD-MCNC: 91 MG/DL (ref 60–100)
GRAM STN SPEC: ABNORMAL
HCT VFR BLD AUTO: 42 % (ref 39–49)
HGB BLD-MCNC: 14.2 G/DL (ref 13.7–17.3)
IMM GRANULOCYTES # BLD: 0.01 10*3/MM3 (ref 0–0.02)
IMM GRANULOCYTES NFR BLD: 0.2 % (ref 0–0.5)
LYMPHOCYTES # BLD AUTO: 1.8 10*3/MM3 (ref 0.6–4.2)
LYMPHOCYTES NFR BLD AUTO: 30.9 % (ref 10–50)
MCH RBC QN AUTO: 32.8 PG (ref 26.5–34)
MCHC RBC AUTO-ENTMCNC: 33.8 G/DL (ref 31.5–36.3)
MCV RBC AUTO: 97 FL (ref 80–98)
MONOCYTES # BLD AUTO: 0.69 10*3/MM3 (ref 0–0.9)
MONOCYTES NFR BLD AUTO: 11.8 % (ref 0–12)
NEUTROPHILS # BLD AUTO: 2.87 10*3/MM3 (ref 2–8.6)
NEUTROPHILS NFR BLD AUTO: 49.2 % (ref 37–80)
NRBC BLD MANUAL-RTO: 0 /100 WBC (ref 0–0)
PLATELET # BLD AUTO: 129 10*3/MM3 (ref 150–450)
PMV BLD AUTO: 10.7 FL (ref 8–12)
POTASSIUM BLD-SCNC: 4.5 MMOL/L (ref 3.5–5.1)
RBC # BLD AUTO: 4.33 10*6/MM3 (ref 4.37–5.74)
SODIUM BLD-SCNC: 142 MMOL/L (ref 137–145)
VANCOMYCIN TROUGH SERPL-MCNC: 19.88 MCG/ML (ref 10–15)
WBC NRBC COR # BLD: 5.83 10*3/MM3 (ref 3.2–9.8)

## 2017-04-19 PROCEDURE — 80048 BASIC METABOLIC PNL TOTAL CA: CPT | Performed by: NURSE PRACTITIONER

## 2017-04-19 PROCEDURE — 88307 TISSUE EXAM BY PATHOLOGIST: CPT | Performed by: PATHOLOGY

## 2017-04-19 PROCEDURE — 0HRNX73 REPLACEMENT OF LEFT FOOT SKIN WITH AUTOLOGOUS TISSUE SUBSTITUTE, FULL THICKNESS, EXTERNAL APPROACH: ICD-10-PCS | Performed by: PODIATRIST

## 2017-04-19 PROCEDURE — 0L8T0ZZ DIVISION OF LEFT ANKLE TENDON, OPEN APPROACH: ICD-10-PCS | Performed by: PODIATRIST

## 2017-04-19 PROCEDURE — 25010000002 VANCOMYCIN PER 500 MG: Performed by: INTERNAL MEDICINE

## 2017-04-19 PROCEDURE — 25010000002 MORPHINE SULFATE (PF) 2 MG/ML SOLUTION: Performed by: FAMILY MEDICINE

## 2017-04-19 PROCEDURE — 85025 COMPLETE CBC W/AUTO DIFF WBC: CPT | Performed by: NURSE PRACTITIONER

## 2017-04-19 PROCEDURE — 0Y6N0ZC DETACHMENT AT LEFT FOOT, PARTIAL 3RD RAY, OPEN APPROACH: ICD-10-PCS | Performed by: PODIATRIST

## 2017-04-19 PROCEDURE — 0Y6N0ZF DETACHMENT AT LEFT FOOT, PARTIAL 5TH RAY, OPEN APPROACH: ICD-10-PCS | Performed by: PODIATRIST

## 2017-04-19 PROCEDURE — 25010000002 PIPERACILLIN SOD-TAZOBACTAM PER 1 G: Performed by: NURSE PRACTITIONER

## 2017-04-19 PROCEDURE — 0Y6N0ZD DETACHMENT AT LEFT FOOT, PARTIAL 4TH RAY, OPEN APPROACH: ICD-10-PCS | Performed by: PODIATRIST

## 2017-04-19 PROCEDURE — 15240 FTH/GFT F/C/C/M/N/AX/G/H/F20: CPT | Performed by: PODIATRIST

## 2017-04-19 PROCEDURE — 25010000002 MIDAZOLAM PER 1 MG: Performed by: NURSE ANESTHETIST, CERTIFIED REGISTERED

## 2017-04-19 PROCEDURE — 0Y6N0Z9 DETACHMENT AT LEFT FOOT, PARTIAL 1ST RAY, OPEN APPROACH: ICD-10-PCS | Performed by: PODIATRIST

## 2017-04-19 PROCEDURE — 0HXNXZZ TRANSFER LEFT FOOT SKIN, EXTERNAL APPROACH: ICD-10-PCS | Performed by: PODIATRIST

## 2017-04-19 PROCEDURE — 25010000002 PROPOFOL 10 MG/ML EMULSION: Performed by: NURSE ANESTHETIST, CERTIFIED REGISTERED

## 2017-04-19 PROCEDURE — 80202 ASSAY OF VANCOMYCIN: CPT | Performed by: INTERNAL MEDICINE

## 2017-04-19 PROCEDURE — 0Y6N0ZB DETACHMENT AT LEFT FOOT, PARTIAL 2ND RAY, OPEN APPROACH: ICD-10-PCS | Performed by: PODIATRIST

## 2017-04-19 PROCEDURE — 88307 TISSUE EXAM BY PATHOLOGIST: CPT | Performed by: PODIATRIST

## 2017-04-19 PROCEDURE — 25010000002 FENTANYL CITRATE (PF) 100 MCG/2ML SOLUTION: Performed by: NURSE ANESTHETIST, CERTIFIED REGISTERED

## 2017-04-19 RX ORDER — LABETALOL HYDROCHLORIDE 5 MG/ML
5 INJECTION, SOLUTION INTRAVENOUS
Status: DISCONTINUED | OUTPATIENT
Start: 2017-04-19 | End: 2017-04-19 | Stop reason: HOSPADM

## 2017-04-19 RX ORDER — SODIUM CHLORIDE, SODIUM GLUCONATE, SODIUM ACETATE, POTASSIUM CHLORIDE, AND MAGNESIUM CHLORIDE 526; 502; 368; 37; 30 MG/100ML; MG/100ML; MG/100ML; MG/100ML; MG/100ML
INJECTION, SOLUTION INTRAVENOUS CONTINUOUS PRN
Status: DISCONTINUED | OUTPATIENT
Start: 2017-04-19 | End: 2017-04-19 | Stop reason: SURG

## 2017-04-19 RX ORDER — BUPIVACAINE HYDROCHLORIDE 5 MG/ML
INJECTION, SOLUTION EPIDURAL; INTRACAUDAL AS NEEDED
Status: DISCONTINUED | OUTPATIENT
Start: 2017-04-19 | End: 2017-04-25 | Stop reason: HOSPADM

## 2017-04-19 RX ORDER — FLUMAZENIL 0.1 MG/ML
0.2 INJECTION INTRAVENOUS AS NEEDED
Status: DISCONTINUED | OUTPATIENT
Start: 2017-04-19 | End: 2017-04-19 | Stop reason: HOSPADM

## 2017-04-19 RX ORDER — NALOXONE HCL 0.4 MG/ML
0.2 VIAL (ML) INJECTION AS NEEDED
Status: DISCONTINUED | OUTPATIENT
Start: 2017-04-19 | End: 2017-04-19 | Stop reason: HOSPADM

## 2017-04-19 RX ORDER — MIDAZOLAM HYDROCHLORIDE 1 MG/ML
INJECTION INTRAMUSCULAR; INTRAVENOUS AS NEEDED
Status: DISCONTINUED | OUTPATIENT
Start: 2017-04-19 | End: 2017-04-19 | Stop reason: SURG

## 2017-04-19 RX ORDER — FENTANYL CITRATE 50 UG/ML
INJECTION, SOLUTION INTRAMUSCULAR; INTRAVENOUS AS NEEDED
Status: DISCONTINUED | OUTPATIENT
Start: 2017-04-19 | End: 2017-04-19 | Stop reason: SURG

## 2017-04-19 RX ORDER — HYDROCODONE BITARTRATE AND ACETAMINOPHEN 7.5; 325 MG/1; MG/1
1 TABLET ORAL EVERY 6 HOURS PRN
Status: DISCONTINUED | OUTPATIENT
Start: 2017-04-19 | End: 2017-04-25 | Stop reason: HOSPADM

## 2017-04-19 RX ORDER — ACETAMINOPHEN 325 MG/1
650 TABLET ORAL ONCE AS NEEDED
Status: DISCONTINUED | OUTPATIENT
Start: 2017-04-19 | End: 2017-04-19 | Stop reason: HOSPADM

## 2017-04-19 RX ORDER — LIDOCAINE HYDROCHLORIDE 20 MG/ML
INJECTION, SOLUTION INFILTRATION; PERINEURAL AS NEEDED
Status: DISCONTINUED | OUTPATIENT
Start: 2017-04-19 | End: 2017-04-19 | Stop reason: SURG

## 2017-04-19 RX ORDER — ACETAMINOPHEN 650 MG/1
650 SUPPOSITORY RECTAL ONCE AS NEEDED
Status: DISCONTINUED | OUTPATIENT
Start: 2017-04-19 | End: 2017-04-19 | Stop reason: HOSPADM

## 2017-04-19 RX ORDER — DIPHENHYDRAMINE HYDROCHLORIDE 50 MG/ML
12.5 INJECTION INTRAMUSCULAR; INTRAVENOUS
Status: DISCONTINUED | OUTPATIENT
Start: 2017-04-19 | End: 2017-04-19 | Stop reason: HOSPADM

## 2017-04-19 RX ORDER — ONDANSETRON 2 MG/ML
4 INJECTION INTRAMUSCULAR; INTRAVENOUS ONCE AS NEEDED
Status: DISCONTINUED | OUTPATIENT
Start: 2017-04-19 | End: 2017-04-19 | Stop reason: HOSPADM

## 2017-04-19 RX ORDER — PROPOFOL 10 MG/ML
VIAL (ML) INTRAVENOUS AS NEEDED
Status: DISCONTINUED | OUTPATIENT
Start: 2017-04-19 | End: 2017-04-19 | Stop reason: SURG

## 2017-04-19 RX ADMIN — LIDOCAINE HYDROCHLORIDE 50 MG: 20 INJECTION, SOLUTION INFILTRATION; PERINEURAL at 11:23

## 2017-04-19 RX ADMIN — SODIUM CHLORIDE 75 ML/HR: 9 INJECTION, SOLUTION INTRAVENOUS at 17:42

## 2017-04-19 RX ADMIN — MORPHINE SULFATE 2 MG: 2 INJECTION, SOLUTION INTRAMUSCULAR; INTRAVENOUS at 22:47

## 2017-04-19 RX ADMIN — PROPOFOL 50 MG: 10 INJECTION, EMULSION INTRAVENOUS at 11:25

## 2017-04-19 RX ADMIN — MORPHINE SULFATE 2 MG: 2 INJECTION, SOLUTION INTRAMUSCULAR; INTRAVENOUS at 18:32

## 2017-04-19 RX ADMIN — TAZOBACTAM SODIUM AND PIPERACILLIN SODIUM 3.38 G: 375; 3 INJECTION, SOLUTION INTRAVENOUS at 05:49

## 2017-04-19 RX ADMIN — SODIUM CHLORIDE 300 ML: 900 INJECTION, SOLUTION INTRAVENOUS at 11:27

## 2017-04-19 RX ADMIN — EPHEDRINE SULFATE 10 MG: 50 INJECTION INTRAMUSCULAR; INTRAVENOUS; SUBCUTANEOUS at 12:11

## 2017-04-19 RX ADMIN — PANTOPRAZOLE SODIUM 40 MG: 40 TABLET, DELAYED RELEASE ORAL at 05:49

## 2017-04-19 RX ADMIN — TAZOBACTAM SODIUM AND PIPERACILLIN SODIUM 3.38 G: 375; 3 INJECTION, SOLUTION INTRAVENOUS at 11:26

## 2017-04-19 RX ADMIN — DOCUSATE SODIUM 100 MG: 100 CAPSULE, LIQUID FILLED ORAL at 17:46

## 2017-04-19 RX ADMIN — MIDAZOLAM 1 MG: 1 INJECTION INTRAMUSCULAR; INTRAVENOUS at 11:10

## 2017-04-19 RX ADMIN — SODIUM CHLORIDE, SODIUM GLUCONATE, SODIUM ACETATE, POTASSIUM CHLORIDE, AND MAGNESIUM CHLORIDE: 526; 502; 368; 37; 30 INJECTION, SOLUTION INTRAVENOUS at 11:30

## 2017-04-19 RX ADMIN — PROPOFOL 150 MG: 10 INJECTION, EMULSION INTRAVENOUS at 11:23

## 2017-04-19 RX ADMIN — MIDAZOLAM 1 MG: 1 INJECTION INTRAMUSCULAR; INTRAVENOUS at 11:15

## 2017-04-19 RX ADMIN — EPHEDRINE SULFATE 10 MG: 50 INJECTION INTRAMUSCULAR; INTRAVENOUS; SUBCUTANEOUS at 11:55

## 2017-04-19 RX ADMIN — TAZOBACTAM SODIUM AND PIPERACILLIN SODIUM 3.38 G: 375; 3 INJECTION, SOLUTION INTRAVENOUS at 17:45

## 2017-04-19 RX ADMIN — FENTANYL CITRATE 25 MCG: 50 INJECTION, SOLUTION INTRAMUSCULAR; INTRAVENOUS at 11:35

## 2017-04-19 RX ADMIN — MORPHINE SULFATE 2 MG: 2 INJECTION, SOLUTION INTRAMUSCULAR; INTRAVENOUS at 05:46

## 2017-04-19 RX ADMIN — VANCOMYCIN HYDROCHLORIDE 2000 MG: 1 INJECTION, POWDER, LYOPHILIZED, FOR SOLUTION INTRAVENOUS at 02:52

## 2017-04-19 RX ADMIN — MORPHINE SULFATE 2 MG: 2 INJECTION, SOLUTION INTRAMUSCULAR; INTRAVENOUS at 02:51

## 2017-04-19 RX ADMIN — HYDROCODONE BITARTRATE AND ACETAMINOPHEN 1 TABLET: 7.5; 325 TABLET ORAL at 17:42

## 2017-04-19 RX ADMIN — EPHEDRINE SULFATE 10 MG: 50 INJECTION INTRAMUSCULAR; INTRAVENOUS; SUBCUTANEOUS at 11:45

## 2017-04-19 NOTE — PLAN OF CARE
Problem: Sepsis (Adult)  Goal: Signs and Symptoms of Listed Potential Problems Will be Absent or Manageable (Sepsis)  Outcome: Ongoing (interventions implemented as appropriate)    Problem: Pain, Acute (Adult)  Goal: Acceptable Pain Control/Comfort Level  Outcome: Ongoing (interventions implemented as appropriate)    Problem: Perioperative Period (Adult)  Goal: Signs and Symptoms of Listed Potential Problems Will be Absent or Manageable (Perioperative Period)  Outcome: Ongoing (interventions implemented as appropriate)

## 2017-04-19 NOTE — ANESTHESIA PREPROCEDURE EVALUATION
Anesthesia Evaluation     Patient summary reviewed and Nursing notes reviewed   NPO Status: > 8 hours   Airway   Mallampati: III  TM distance: >3 FB  Neck ROM: full  possible difficult intubation  Dental    (+) edentulous    Pulmonary - normal exam   (+) a smoker ( stopped smoking 5 months ago) Former,     ROS comment: Due to past stroke (1996) he sometimes tends to aspirate, but fully denies any h/o aspiration pneumonia  Cardiovascular - normal exam    (+) hypertension well controlled,     ROS comment: He fully denies any h/o heart problems    Neuro/Psych  (+) CVA ( 1996 with residual dysphagia, mild aspiration problems w/o pneumonia and problems with speech He also stated that his neck arteries are ok.) residual symptoms,    GI/Hepatic/Renal/Endo - negative ROS     Musculoskeletal         ROS comment: Left foot infection, for left TMA with achilles tendon lengthening  Abdominal    Substance History - negative use     OB/GYN negative ob/gyn ROS         Other   (+) arthritis                                 Anesthesia Plan    ASA 3     general     intravenous induction   Anesthetic plan and risks discussed with patient.

## 2017-04-19 NOTE — PLAN OF CARE
Problem: Patient Care Overview (Adult)  Goal: Plan of Care Review  Outcome: Ongoing (interventions implemented as appropriate)    04/19/17 1327   Coping/Psychosocial Response Interventions   Plan Of Care Reviewed With patient   Patient Care Overview   Progress improving   Outcome Evaluation   Outcome Summary/Follow up Plan Pt meets pacu d/c criteria, VSS       Goal: Adult Individualization and Mutuality  Outcome: Ongoing (interventions implemented as appropriate)    Problem: Perioperative Period (Adult)  Goal: Signs and Symptoms of Listed Potential Problems Will be Absent or Manageable (Perioperative Period)  Outcome: Ongoing (interventions implemented as appropriate)

## 2017-04-19 NOTE — INTERVAL H&P NOTE
H&P reviewed. The patient was examined and there are no changes to the H&P. Evaluated in same day surgery. Proceed with left TMA, achilles lengthening, possible grafting of foot ulcer.          This document has been electronically signed by Estuardo Fried DPM on April 19, 2017 10:53 AM

## 2017-04-19 NOTE — OP NOTE
Date of Procedure:     04/19/2017     SURGEON: Estuardo Fried DPM     ASSISTANT: Lizbeth Reed CST      PREOPERATIVE DIAGNOSES:   1.  Left foot abscess and cellulitis.     2.  Left foot osteomyelitis.    3.  Full-thickness foot ulcer, left foot.    4.  Equinus contracture of ankle.     POSTOPERATIVE DIAGNOSES:   1.  Left foot abscess and cellulitis.     2.  Left foot osteomyelitis.    3.  Full-thickness foot ulcer, left foot.    4.  Equinus contracture of ankle.     PROCEDURES PERFORMED:   1.  Left foot transmetatarsal amputation.    2.  Excision of full-thickness ulceration measuring 3 x 2 cm with local soft tissue advancement and primary closure.    3.  Full-thickness, pinch type skin graft, left foot.    4.  Percutaneous Achilles tendon lengthening, left ankle.     ANESTHESIA: General.     HEMOSTASIS: None.     ESTIMATED BLOOD LOSS: 50 mL.     MATERIALS: A 1/2-inch plain packing strip.     INJECTABLES: Marcaine plain 0.5%, 30 mL.     COMPLICATIONS: None.     INDICATIONS FOR PROCEDURE: This is a current patient at [facility] who is well-known to me and was previously treated for a left hallux infection with a partial 1st ray amputation. He healed well from that, but developed additional wounds to his left plantar forefoot and left lateral midfoot. Patient failed conservative care and developed another abscess in his forefoot. Discussed treatment options including local I and D versus transmetatarsal amputation. Patient elected for the amputation as it gives him best chance of healing. All risks, benefits, and potential complications were described in detail and no guarantee was given or implied at any time.  Patient has remained n.p.o. since midnight. Informed consent had been obtained and located in the chart.        DESCRIPTION OF PROCEDURE: Under mild sedation, patient was brought into the operating room and placed on the operative table in supine position. Following induction of  general anesthesia, the left foot and ankle were prepped and draped in the usual aseptic fashion. Attention at this time was then directed to the left forefoot where a fish-mouth type incision was planned circumferentially around the forefoot, excising the noted sub-2nd metatarsophalangeal joint ulcer which measured approximately 3 x 2 cm. This incision was carried out full-thickness down to the level of the metatarsals. A small bone saw was then utilized to resect the metatarsals approximately at the neck. The forefoot was then excised and passed off the surgical field. A combination of blunt and sharp dissection was then carried further proximally onto the metatarsals and the metatarsals were once again cut at approximately mid shaft while maintaining the normal parabola. The small bony fragments were then passed off the surgical field. The incision was flushed with copious amounts of sterile saline under gravity lavage. Deep closure was performed with 3-0 Vicryl. The medial aspect of the plantar flap was then rotated laterally to allow for closure of the previously noted ulcer defect and excellent skin apposition and closure was performed with 3-0 nylon.     Attention at this time was then directed to the back table where saved skin from the lateral aspect of the excised forefoot was trimmed to an approximately 2 cm piece and stripped of all underlying subcutaneous tissue. This small wedge of skin was then utilized as a full-thickness skin graft for the noted lateral 5th metatarsal base ulceration which measured approximately 2 x 2 cm in diameter. This small full-thickness skin graft was then secured with 4-0 nylon. Once in place, the skin graft was perforated to allow for any drainage.     Attention was then finally directed to the posterior Achilles tendon where 3 percutaneous incisions were created on the mediolateral and then once again medial side of the tendon of a space approximately 3 cm from one another  and a sara-section of the Achilles tendon was performed. Dorsal _____ force was then placed on the forefoot thus lengthening the Achilles tendon. These incisions were then flushed and the skin was reapproximated with 4-0 nylon. A dry sterile dressing was then applied, followed by a well-padded posterior mold splint. Patient was taken from the operating room to the recovery room with vital signs stable and neurovascular status intact.     He will return to the hospital where he will continued to be followed. Will plan full packing within 24 hours.        CC:            Estuardo Fried DPM  Dictation Date/Time: 04/19/2017 16:09:43(Eastern Time Zone)  Transcribed Date/Time: 04/19/2017 17:14:31 (Eastern Time Zone)  Dictator/ Initials:  Bailey  Document ID:                [id number]  Job ID: [id number]

## 2017-04-19 NOTE — NURSING NOTE
Patient's left forearm 20g IV is red and swollen, medications are still infusing correctly, patient states he tastes when his line is flushed. Discussed the risks of infection and that he needs a new IV. Patient refuses to be stuck for a new IV.

## 2017-04-19 NOTE — ANESTHESIA POSTPROCEDURE EVALUATION
Patient: Damian Wild    Procedure Summary     Date Anesthesia Start Anesthesia Stop Room / Location    04/19/17 1112 1308 [facility] OR 09 / [facility]       Procedure Diagnosis Surgeon Provider    LEFT FOOT TRANSMETATARSAL AMPUTATION, TENDO ACHILLES LENGTHENING. (Left Fingers) Osteomyelitis of ankle or foot, acute, left; Foot ulcer, left; Equinus contracture of ankle  (Osteomyelitis of ankle or foot, acute, left [M86.172]) NAVJOT Isaacs MD          Anesthesia Type: general  Last vitals  BP 90/63 (04/19/17 1525)    Temp 96.9 °F (36.1 °C) (04/19/17 1525)    Pulse 73 (04/19/17 1525)   Resp 18 (04/19/17 1525)    SpO2 96 % (04/19/17 1525)      Post Anesthesia Care and Evaluation    Patient location during evaluation: PACU  Patient participation: complete - patient participated  Level of consciousness: awake and alert  Pain management: adequate  Airway patency: patent  Anesthetic complications: No anesthetic complications    Cardiovascular status: acceptable and stable  Respiratory status: acceptable, face mask and spontaneous ventilation  Hydration status: acceptable

## 2017-04-19 NOTE — BRIEF OP NOTE
"AMPUTATION DIGIT  Procedure Note    Damian Chaock  4/17/2017 - 4/19/2017    Pre-op Diagnosis:   Osteomyelitis of ankle or foot, acute, left [M86.172]    Post-op Diagnosis:     Post-Op Diagnosis Codes:     * Osteomyelitis of ankle or foot, acute, left [M86.172]     * Foot ulcer, left [L97.529]     * Equinus contracture of ankle [M24.573]    Procedure/CPT® Codes:      Procedure(s):  LEFT FOOT TRANSMETATARSAL AMPUTATION WITH EXCISION OF WOUND AND ROTATIONAL CLOSURE, TENDO ACHILLES LENGTHENING, FULL THICKNESS SKIN GRAFTING.    Surgeon(s):  Estuardo Fried DPM    Anesthesia: General    Staff:   Circulator: Alie Estevez RN  Scrub Person: Pierre Parker  Assistant: Lizbeth Reed MA    Estimated Blood Loss: 50 mL  Urine Voided: * No values recorded between 4/19/2017 11:11 AM and 4/19/2017  1:04 PM *    Specimens:                  ID Type Source Tests Collected by Time Destination   A : left forefoot Tissue Foot, Left TISSUE EXAM Estuardo Fried DPM 4/19/2017 1215          Drains:    1/2\" packing       Findings: Consistent with diagnosis.    Complications: None          This document has been electronically signed by Estuardo Fried DPM on April 19, 2017 1:10 PM     Estuardo Fried DPM     Date: 4/19/2017  Time: 1:08 PM      "

## 2017-04-19 NOTE — PROGRESS NOTES
[facility] Medicine Services  INPATIENT PROGRESS NOTE    Length of Stay: 2  Date of Admission: 4/17/2017  Primary Care Physician: BROOKLYNN Gu    Subjective   Chief Complaint: left foot pain and chronic ulcers  HPI:  56-year-old  male who presented as a direct admission from Dr. Fried's office related to chronic foot ulcers. The patient recently underwent surgery to amputate his left great toe related to his chronic ulcerations from poor circulation. The patient has a history of stroke and has difficulty with his speech so history is slightly difficult to obtain however I am able to gather that the patient went to Dr. Fried's office for follow-up and wounds are not healing as expected. MRI revealed concern for osteomyelitis and patient was taken to OR today for left transmetatarsal amputation today.    Review of Systems   Constitutional: Negative for chills and fever.   Respiratory: Negative for cough, chest tightness and shortness of breath.    Cardiovascular: Negative for chest pain, palpitations and leg swelling.   Gastrointestinal: Negative for constipation, diarrhea, nausea and vomiting.   Musculoskeletal: Negative for back pain and neck pain.   Skin: Positive for wound. Negative for pallor.        Ulceration and surgical wound to left foot.    Neurological: Negative for dizziness and weakness.   Psychiatric/Behavioral: Negative for confusion.        All pertinent negatives and positives are as above. All other systems have been reviewed and are negative unless otherwise stated.     Objective    Temp:  [96.2 °F (35.7 °C)-97.8 °F (36.6 °C)] 96.9 °F (36.1 °C)  Heart Rate:  [53-73] 73  Resp:  [16-20] 18  BP: ()/(56-76) 90/63    Physical Exam   Constitutional: He is oriented to person, place, and time. He appears well-developed and well-nourished.   HENT:   Head: Normocephalic.   Eyes: Conjunctivae are normal.   Neck: Neck supple.    Cardiovascular: Normal rate, regular rhythm, normal heart sounds and intact distal pulses.    Pulmonary/Chest: Effort normal and breath sounds normal.   Abdominal: Soft. Bowel sounds are normal. He exhibits no distension. There is no tenderness.   Musculoskeletal: Normal range of motion.   Neurological: He is alert and oriented to person, place, and time.   Skin: Skin is warm and dry.   Dressing and ace wrap in place post op r/t transmetatarsal amputation.  Dressing was not removed for viewing of wound as patient just arrived back to unit from surgery.  No drainage noted to dressing.    Psychiatric: He has a normal mood and affect. His behavior is normal.   Vitals reviewed.          Results Review:  I have reviewed the labs, radiology results, and diagnostic studies.    Laboratory Data:     Results from last 7 days  Lab Units 04/19/17  0607 04/18/17  0529 04/17/17  1741   SODIUM mmol/L 142 138 131*   POTASSIUM mmol/L 4.5 4.7 4.6   CHLORIDE mmol/L 101 99 94*   TOTAL CO2 mmol/L 29.0 30.0 26.0   BUN mg/dL 14 15 16   CREATININE mg/dL 0.89 0.86 0.83   GLUCOSE mg/dL 91 79 73   CALCIUM mg/dL 8.1* 8.5 8.9   ANION GAP mmol/L 12.0 9.0 11.0     Estimated Creatinine Clearance: 107.7 mL/min (by C-G formula based on Cr of 0.89).            Results from last 7 days  Lab Units 04/19/17  0607 04/18/17  0529 04/17/17  1741   WBC 10*3/mm3 5.83 6.52 8.60   HEMOGLOBIN g/dL 14.2 13.9 14.0   HEMATOCRIT % 42.0 40.7 39.4   PLATELETS 10*3/mm3 129* 135* 151           Culture Data:   Blood Culture   Date Value Ref Range Status   04/17/2017 Abnormal Stain (A)  Final   04/17/2017 Staphylococcus, coagulase negative (A)  Final     Comment:     Consistent with contamination at time of collection.     04/17/2017 Abnormal Stain (A)  Preliminary     No results found for: URINECX  No results found for: RESPCX  Wound Culture   Date Value Ref Range Status   04/17/2017 Light growth (2+) Escherichia coli (A)  Final     No results found for: STOOLCX  No  components found for: BODYFLD    Radiology Data:   Imaging Results (last 24 hours)     Procedure Component Value Units Date/Time    MRI Foot Left With & Without Contrast [[id number]] Collected:  04/18/17 1740     Updated:  04/18/17 1748    Narrative:       Patient Name:  BRIANA ROTHMAN  Patient ID:  [id number]   Ordering:  JOLENE CAR  Attending:  LEXI MANNING  Referring:  JOLENE CAR  ------------------------------------------------    MRI left foot with and without contrast.    HISTORY: Evaluate for osteomyelitis.  Prior examination left foot March 28, 2017.    TECHNIQUE: Multiplanar multisequence images left foot obtained  both prior to and following the intervenous infusion of  gadolinium, 20 mL of ProHance. There is been amputation of the  great toe and distal aspect of the first metatarsal. There is a  small fluid collection adjacent to the stump of the distal first  metatarsal.    The remaining portions of the first metatarsal are unremarkable  with normal marrow signal intensity.    There is bone edema and findings suggesting cortical destruction  of the distal aspect of the second metatarsal. These findings are  suspicious for bony infection, osteomyelitis.      Impression:       CONCLUSION: Amputation distal first metatarsal and great toe.  Small fluid collection adjacent to the distal aspect of first  metatarsal most likely development of a small postsurgical bursa  at the stump.    Abnormal distal aspect of second metatarsal with bone edema,  subtle enhancement and some questionable cortical destruction.  These findings are suspicious for bony infection, osteomyelitis.    Electronically signed by:  Yonny Montoya MD  4/18/2017 5:47 PM CDT  Workstation: [id number]          I have reviewed the patient current medications.     Assessment/Plan     Assessment/Plan:    #1 Left foot osteomyelitis: Patient underwent left transmetatarsal amputation today as MRI revealed osteomyelitis.    Corky following.  On Vanc, Zosyn but can likely de-escalate abx if patient improves as worst of ulcers was located on second toe that has been amputated.  #2 hypertension: Continue patient's home doses of lisinopril and HCTZ.  #3 history of CVA     Protonix for GI prophylaxis and Lovenox for DVT prophylaxis.      BROOKLYNN Szymanski   04/19/17   3:48 PM

## 2017-04-19 NOTE — ANESTHESIA PROCEDURE NOTES
Airway  Urgency: elective      General Information and Staff    Patient location during procedure: OR    Indications and Patient Condition  Indications for airway management: airway protection    Preoxygenated: yes  MILS maintained throughout  Mask difficulty assessment: 0 - not attempted    Final Airway Details  Final airway type: supraglottic airway      Successful airway: classic  Size 5    Number of attempts at approach: 1

## 2017-04-19 NOTE — PLAN OF CARE
Problem: Patient Care Overview (Adult)  Goal: Plan of Care Review  Outcome: Ongoing (interventions implemented as appropriate)    04/19/17 0437   Coping/Psychosocial Response Interventions   Plan Of Care Reviewed With patient   Patient Care Overview   Progress no change   Outcome Evaluation   Outcome Summary/Follow up Plan NPO for surgery, frequently requesting pain medication       Goal: Adult Individualization and Mutuality  Outcome: Ongoing (interventions implemented as appropriate)    Problem: Sepsis (Adult)  Goal: Signs and Symptoms of Listed Potential Problems Will be Absent or Manageable (Sepsis)  Outcome: Ongoing (interventions implemented as appropriate)    Problem: Pain, Acute (Adult)  Goal: Acceptable Pain Control/Comfort Level  Outcome: Ongoing (interventions implemented as appropriate)

## 2017-04-19 NOTE — CONSULTS
Inpatient Consult to Podiatry  Consult performed by: JOLENE CAR  Consult ordered by: LOW BYRD  Reason for consult: Left foot infection        Patient Care Team:  BROOKLYNN Gu as PCP - General    Chief complaint: Left foot infection, pain.    History of Present Illness  Mr Wild is a 55 y/o male who is seen for evaluation of left foot infection. He is well known to me and underwent left partial 1st ray amputation in December of 2016. He healed well from that amputation, but subsequently developed 2 additional wounds to the left foot which we have been following weekly and utilizing home care for dressing changes. I sent the patient for admission yesterday following his presentation to clinic with worsening foot pain, edema and erythema of his forefoot. At exam today, pain is his primary complain. Denies N/V/F/C/SOB.    Review of Systems     Constitutional:  Denies recent weight loss, weight gain, fever or chills, no change in exercise tolerance  Musculoskeletal: Toe/foot pain.   Skin: Foot ulcer.  Neurological: Denies paresthesias.  Psychiatric/Behavioral: Denies depression       Vital Signs  Temp:  [96.6 °F (35.9 °C)-97.6 °F (36.4 °C)] 97.4 °F (36.3 °C)  Heart Rate:  [49-65] 65  Resp:  [18] 18  BP: (126-150)/(64-86) 136/71    Physical Exam   Constitutional: he appears well-developed and well-nourished.   Psychiatric: he has a normal mood and affect. his  behavior is normal.     Lower Extremity Exam:  Vascular: DP/PT pulses palpable 1+.   Negative hair growth.   Minimal left foot edema  Feet cool to touch  Neuro: Protective sensation absent, b/l.  DTRs intact  Integument:   Atrophic skin noted b/l  Left partial 1st ray incision now healed  Left lateral 5th met base 3.0 x 2.0 x 0.3 with fibrogranular base. No fluctuance. No drainage, mild periwound erythema +pain on palpation  Plantar 2nd mpj ulceration on left. 2.6 x 1.6 x 0.3 cm with fibronecrotic base and surrounding HPK. No SOI. No  PTB  Left 2nd digit edematous, fluctuant  Musculoskeletal: LE muscle strength 5/5.   Gait normal  Semi-rigid hammertoe deformity toes 2-5 on left  Right TMA    Results Review:   .  WBC   Date Value Ref Range Status   04/18/2017 6.52 3.20 - 9.80 10*3/mm3 Final     RBC   Date Value Ref Range Status   04/18/2017 4.26 (L) 4.37 - 5.74 10*6/mm3 Final     Hemoglobin   Date Value Ref Range Status   04/18/2017 13.9 13.7 - 17.3 g/dL Final     Hematocrit   Date Value Ref Range Status   04/18/2017 40.7 39.0 - 49.0 % Final     MCV   Date Value Ref Range Status   04/18/2017 95.5 80.0 - 98.0 fL Final     MCH   Date Value Ref Range Status   04/18/2017 32.6 26.5 - 34.0 pg Final     MCHC   Date Value Ref Range Status   04/18/2017 34.2 31.5 - 36.3 g/dL Final     RDW   Date Value Ref Range Status   04/18/2017 12.8 11.5 - 14.5 % Final     RDW-SD   Date Value Ref Range Status   04/18/2017 44.1 (H) 35.1 - 43.9 fl Final     MPV   Date Value Ref Range Status   04/18/2017 10.7 8.0 - 12.0 fL Final     Platelets   Date Value Ref Range Status   04/18/2017 135 (L) 150 - 450 10*3/mm3 Final     Neutrophil %   Date Value Ref Range Status   04/18/2017 44.0 37.0 - 80.0 % Final     Lymphocyte %   Date Value Ref Range Status   04/18/2017 32.5 10.0 - 50.0 % Final     Monocyte %   Date Value Ref Range Status   04/18/2017 17.9 (H) 0.0 - 12.0 % Final     Eosinophil %   Date Value Ref Range Status   04/18/2017 5.1 0.0 - 7.0 % Final     Basophil %   Date Value Ref Range Status   04/18/2017 0.3 0.0 - 2.0 % Final     Immature Grans %   Date Value Ref Range Status   04/18/2017 0.2 0.0 - 0.5 % Final     Neutrophils, Absolute   Date Value Ref Range Status   04/18/2017 2.87 2.00 - 8.60 10*3/mm3 Final     Lymphocytes, Absolute   Date Value Ref Range Status   04/18/2017 2.12 0.60 - 4.20 10*3/mm3 Final     Monocytes, Absolute   Date Value Ref Range Status   04/18/2017 1.17 (H) 0.00 - 0.90 10*3/mm3 Final     Eosinophils, Absolute   Date Value Ref Range Status    04/18/2017 0.33 0.00 - 0.70 10*3/mm3 Final     Basophils, Absolute   Date Value Ref Range Status   04/18/2017 0.02 0.00 - 0.20 10*3/mm3 Final     Immature Grans, Absolute   Date Value Ref Range Status   04/18/2017 0.01 0.00 - 0.02 10*3/mm3 Final       Assessment & Plan  1. Left foot abscess, cellulitis  2. Left foot full thickness ulceration  3. Polyneuropathy  4. Right foot TMA    Dressing changed. MRI images reviewed, pt seems to have local abscess at base of 2nd digit with questionable underlying osteomyelitis. Discussed need for I&D vs digital amputation. Also discussed TMA which will likely give patient best chance of healing long term. Patient agrees with this treatment plan. All risks, benefits and potential complications discussed including, but not limited to delayed healing, risk of infection, need for further amputation. Will attempt to add to surgical schedule tomorrow AM. NPO after midnight.      I discussed the patients findings and my recommendations with patient          This document has been electronically signed by Estuardo Fried DPM on April 18, 2017 9:37 PM     Estuardo Fried DPM  04/18/17  9:18 PM

## 2017-04-20 ENCOUNTER — APPOINTMENT (OUTPATIENT)
Dept: ULTRASOUND IMAGING | Facility: HOSPITAL | Age: 57
End: 2017-04-20

## 2017-04-20 ENCOUNTER — APPOINTMENT (OUTPATIENT)
Dept: INTERVENTIONAL RADIOLOGY/VASCULAR | Facility: HOSPITAL | Age: 57
End: 2017-04-20

## 2017-04-20 ENCOUNTER — APPOINTMENT (OUTPATIENT)
Dept: GENERAL RADIOLOGY | Facility: HOSPITAL | Age: 57
End: 2017-04-20

## 2017-04-20 LAB
ANION GAP SERPL CALCULATED.3IONS-SCNC: 10 MMOL/L (ref 5–15)
BASOPHILS # BLD AUTO: 0.02 10*3/MM3 (ref 0–0.2)
BASOPHILS NFR BLD AUTO: 0.3 % (ref 0–2)
BUN BLD-MCNC: 11 MG/DL (ref 7–21)
BUN/CREAT SERPL: 14.7 (ref 7–25)
CALCIUM SPEC-SCNC: 7.8 MG/DL (ref 8.4–10.2)
CHLORIDE SERPL-SCNC: 98 MMOL/L (ref 95–110)
CO2 SERPL-SCNC: 29 MMOL/L (ref 22–31)
CREAT BLD-MCNC: 0.75 MG/DL (ref 0.7–1.3)
DEPRECATED RDW RBC AUTO: 43.9 FL (ref 35.1–43.9)
EOSINOPHIL # BLD AUTO: 0.26 10*3/MM3 (ref 0–0.7)
EOSINOPHIL NFR BLD AUTO: 4.2 % (ref 0–7)
ERYTHROCYTE [DISTWIDTH] IN BLOOD BY AUTOMATED COUNT: 12.7 % (ref 11.5–14.5)
GFR SERPL CREATININE-BSD FRML MDRD: 107 ML/MIN/1.73 (ref 60–130)
GLUCOSE BLD-MCNC: 90 MG/DL (ref 60–100)
HCT VFR BLD AUTO: 35.9 % (ref 39–49)
HGB BLD-MCNC: 12.2 G/DL (ref 13.7–17.3)
IMM GRANULOCYTES # BLD: 0.01 10*3/MM3 (ref 0–0.02)
IMM GRANULOCYTES NFR BLD: 0.2 % (ref 0–0.5)
LYMPHOCYTES # BLD AUTO: 1.76 10*3/MM3 (ref 0.6–4.2)
LYMPHOCYTES NFR BLD AUTO: 28.3 % (ref 10–50)
MCH RBC QN AUTO: 32.4 PG (ref 26.5–34)
MCHC RBC AUTO-ENTMCNC: 34 G/DL (ref 31.5–36.3)
MCV RBC AUTO: 95.2 FL (ref 80–98)
MONOCYTES # BLD AUTO: 0.65 10*3/MM3 (ref 0–0.9)
MONOCYTES NFR BLD AUTO: 10.5 % (ref 0–12)
NEUTROPHILS # BLD AUTO: 3.52 10*3/MM3 (ref 2–8.6)
NEUTROPHILS NFR BLD AUTO: 56.5 % (ref 37–80)
PLATELET # BLD AUTO: 122 10*3/MM3 (ref 150–450)
PMV BLD AUTO: 10.7 FL (ref 8–12)
POTASSIUM BLD-SCNC: 4.3 MMOL/L (ref 3.5–5.1)
RBC # BLD AUTO: 3.77 10*6/MM3 (ref 4.37–5.74)
SODIUM BLD-SCNC: 137 MMOL/L (ref 137–145)
WBC NRBC COR # BLD: 6.22 10*3/MM3 (ref 3.2–9.8)

## 2017-04-20 PROCEDURE — 85025 COMPLETE CBC W/AUTO DIFF WBC: CPT | Performed by: NURSE PRACTITIONER

## 2017-04-20 PROCEDURE — 76937 US GUIDE VASCULAR ACCESS: CPT

## 2017-04-20 PROCEDURE — 02HV33Z INSERTION OF INFUSION DEVICE INTO SUPERIOR VENA CAVA, PERCUTANEOUS APPROACH: ICD-10-PCS | Performed by: RADIOLOGY

## 2017-04-20 PROCEDURE — 80048 BASIC METABOLIC PNL TOTAL CA: CPT | Performed by: NURSE PRACTITIONER

## 2017-04-20 PROCEDURE — 25010000002 MORPHINE SULFATE (PF) 2 MG/ML SOLUTION: Performed by: FAMILY MEDICINE

## 2017-04-20 PROCEDURE — C1751 CATH, INF, PER/CENT/MIDLINE: HCPCS

## 2017-04-20 PROCEDURE — B548ZZA ULTRASONOGRAPHY OF SUPERIOR VENA CAVA, GUIDANCE: ICD-10-PCS | Performed by: RADIOLOGY

## 2017-04-20 PROCEDURE — 25010000002 PIPERACILLIN SOD-TAZOBACTAM PER 1 G: Performed by: NURSE PRACTITIONER

## 2017-04-20 PROCEDURE — 25010000002 VANCOMYCIN PER 500 MG: Performed by: INTERNAL MEDICINE

## 2017-04-20 RX ORDER — SODIUM CHLORIDE 0.9 % (FLUSH) 0.9 %
10 SYRINGE (ML) INJECTION EVERY 12 HOURS SCHEDULED
Status: DISCONTINUED | OUTPATIENT
Start: 2017-04-20 | End: 2017-04-25 | Stop reason: HOSPADM

## 2017-04-20 RX ADMIN — MORPHINE SULFATE 2 MG: 2 INJECTION, SOLUTION INTRAMUSCULAR; INTRAVENOUS at 03:26

## 2017-04-20 RX ADMIN — HYDROCODONE BITARTRATE AND ACETAMINOPHEN 1 TABLET: 7.5; 325 TABLET ORAL at 00:31

## 2017-04-20 RX ADMIN — MORPHINE SULFATE 2 MG: 2 INJECTION, SOLUTION INTRAMUSCULAR; INTRAVENOUS at 13:16

## 2017-04-20 RX ADMIN — TAZOBACTAM SODIUM AND PIPERACILLIN SODIUM 3.38 G: 375; 3 INJECTION, SOLUTION INTRAVENOUS at 17:39

## 2017-04-20 RX ADMIN — VANCOMYCIN HYDROCHLORIDE 2000 MG: 1 INJECTION, POWDER, LYOPHILIZED, FOR SOLUTION INTRAVENOUS at 02:06

## 2017-04-20 RX ADMIN — MORPHINE SULFATE 2 MG: 2 INJECTION, SOLUTION INTRAMUSCULAR; INTRAVENOUS at 09:26

## 2017-04-20 RX ADMIN — DOCUSATE SODIUM 100 MG: 100 CAPSULE, LIQUID FILLED ORAL at 08:05

## 2017-04-20 RX ADMIN — Medication 10 ML: at 03:26

## 2017-04-20 RX ADMIN — LISINOPRIL 20 MG: 20 TABLET ORAL at 08:05

## 2017-04-20 RX ADMIN — TAZOBACTAM SODIUM AND PIPERACILLIN SODIUM 3.38 G: 375; 3 INJECTION, SOLUTION INTRAVENOUS at 12:18

## 2017-04-20 RX ADMIN — TAZOBACTAM SODIUM AND PIPERACILLIN SODIUM 3.38 G: 375; 3 INJECTION, SOLUTION INTRAVENOUS at 00:27

## 2017-04-20 RX ADMIN — PANTOPRAZOLE SODIUM 40 MG: 40 TABLET, DELAYED RELEASE ORAL at 06:24

## 2017-04-20 RX ADMIN — MORPHINE SULFATE 2 MG: 2 INJECTION, SOLUTION INTRAMUSCULAR; INTRAVENOUS at 22:05

## 2017-04-20 RX ADMIN — SODIUM CHLORIDE, PRESERVATIVE FREE 10 ML: 5 INJECTION INTRAVENOUS at 22:04

## 2017-04-20 RX ADMIN — HYDROCODONE BITARTRATE AND ACETAMINOPHEN 1 TABLET: 7.5; 325 TABLET ORAL at 08:05

## 2017-04-20 RX ADMIN — TAZOBACTAM SODIUM AND PIPERACILLIN SODIUM 3.38 G: 375; 3 INJECTION, SOLUTION INTRAVENOUS at 06:24

## 2017-04-20 RX ADMIN — VANCOMYCIN HYDROCHLORIDE 1750 MG: 1 INJECTION, POWDER, LYOPHILIZED, FOR SOLUTION INTRAVENOUS at 13:02

## 2017-04-20 RX ADMIN — HYDROCHLOROTHIAZIDE 25 MG: 25 TABLET ORAL at 08:05

## 2017-04-20 RX ADMIN — HYDROCODONE BITARTRATE AND ACETAMINOPHEN 1 TABLET: 7.5; 325 TABLET ORAL at 17:43

## 2017-04-20 RX ADMIN — SODIUM CHLORIDE 75 ML/HR: 9 INJECTION, SOLUTION INTRAVENOUS at 09:23

## 2017-04-20 NOTE — PLAN OF CARE
Problem: Patient Care Overview (Adult)  Goal: Plan of Care Review  Outcome: Ongoing (interventions implemented as appropriate)    04/20/17 0431   Coping/Psychosocial Response Interventions   Plan Of Care Reviewed With patient   Patient Care Overview   Progress no change   Outcome Evaluation   Outcome Summary/Follow up Plan Frequently requesting pain medication       Goal: Adult Individualization and Mutuality  Outcome: Ongoing (interventions implemented as appropriate)    Problem: Sepsis (Adult)  Goal: Signs and Symptoms of Listed Potential Problems Will be Absent or Manageable (Sepsis)  Outcome: Ongoing (interventions implemented as appropriate)    Problem: Pain, Acute (Adult)  Goal: Acceptable Pain Control/Comfort Level  Outcome: Ongoing (interventions implemented as appropriate)    Problem: Perioperative Period (Adult)  Goal: Signs and Symptoms of Listed Potential Problems Will be Absent or Manageable (Perioperative Period)  Outcome: Outcome(s) achieved Date Met:  04/20/17

## 2017-04-20 NOTE — PROGRESS NOTES
"Pharmacokinetic Follow-up Note - Vancomycin    Damian Wild is a 56 y.o. male  [Ht: 70\" (177.8 cm); Wt: 222 lb (101 kg)]    Estimated Creatinine Clearance: 127.9 mL/min (by C-G formula based on Cr of 0.75).   Lab Results   Component Value Date    CREATININE 0.75 04/20/2017    CREATININE 0.89 04/19/2017    CREATININE 0.86 04/18/2017      Lab Results   Component Value Date    WBC 6.22 04/20/2017    WBC 5.83 04/19/2017    WBC 6.52 04/18/2017      Lab Results   Component Value Date    VANCOTROUGH 19.88 (H) 04/19/2017           Current Vancomycin Dose:  2000 mg IVPB every 12 hours, day 3 of therapy.    Indication for use: osteomyelitis of foot, s/p left transmetatarsal amputation.  Assessment/Plan:  Trough is a the high end of range, will lower vancomycin to 1750 mg IV q 12hr as area containing infection has been amputated.  Concurrently receiving zosyn.  Please consider de-escalation as patient improves.  Pharmacy will continue to  monitor renal function and adjust dose accordingly.    Merediht Miller RPH   04/20/17 8:50 AM    "

## 2017-04-20 NOTE — PLAN OF CARE
Problem: Patient Care Overview (Adult)  Goal: Plan of Care Review  Outcome: Ongoing (interventions implemented as appropriate)    04/20/17 1413   Coping/Psychosocial Response Interventions   Plan Of Care Reviewed With patient   Patient Care Overview   Progress improving   Outcome Evaluation   Outcome Summary/Follow up Plan VSS. pt states he feels good. need better pain control       Goal: Adult Individualization and Mutuality  Outcome: Ongoing (interventions implemented as appropriate)    04/20/17 1413   Individualization   Patient Specific Preferences likes urinal at bedside; prefers getting up to toilet for BM   Patient Specific Goals better pain control       Goal: Discharge Needs Assessment  Outcome: Ongoing (interventions implemented as appropriate)    04/20/17 1413   Discharge Needs Assessment   Concerns To Be Addressed denies needs/concerns at this time         Problem: Sepsis (Adult)  Goal: Signs and Symptoms of Listed Potential Problems Will be Absent or Manageable (Sepsis)  Outcome: Ongoing (interventions implemented as appropriate)    04/20/17 1413   Sepsis   Problems Assessed (Sepsis) all   Problems Present (Sepsis) progression of infection         Problem: Pain, Acute (Adult)  Goal: Acceptable Pain Control/Comfort Level  Outcome: Ongoing (interventions implemented as appropriate)    04/20/17 1413   Pain, Acute (Adult)   Acceptable Pain Control/Comfort Level making progress toward outcome   Pt states interventions help to reduce pain

## 2017-04-20 NOTE — PROGRESS NOTES
[facility] Medicine Services  INPATIENT PROGRESS NOTE     LOS: 3 days   Patient Care Team:  BROOKLYNN Gu as PCP - General    Chief Complaint: Patient is seen for follow-up today.  He had a transmetatarsal amputation of the left foot yesterday due to osteomyelitis.  He is able to communicate despite altered speech from a previous stroke.  No new complaints today.       Subjective     Interval History:     Patient Complaints: No new complaints except periodic pain at the surgical site.    History taken from: Patient    Review of Systems:    Review of Systems   Constitutional: Negative for chills, diaphoresis, fatigue and fever.   Respiratory: Negative for cough, chest tightness, shortness of breath and wheezing.    Cardiovascular: Negative for chest pain, palpitations and leg swelling.   Gastrointestinal: Negative for abdominal pain, constipation, diarrhea, nausea and vomiting.   Genitourinary: Negative for dysuria, flank pain, frequency, hematuria and urgency.   Neurological: Positive for speech difficulty. Negative for tremors, weakness, light-headedness and headaches.   Psychiatric/Behavioral: Negative for agitation and confusion.         Objective     Vital Signs  Temp:  [96.2 °F (35.7 °C)-98.2 °F (36.8 °C)] 96.7 °F (35.9 °C)  Heart Rate:  [62-79] 63  Resp:  [16-18] 16  BP: ()/(56-92) 144/72    Physical Exam:   Physical Exam   Constitutional: He is oriented to person, place, and time. He appears well-developed and well-nourished.   Cardiovascular: Normal rate and regular rhythm.  Exam reveals no gallop.    No murmur heard.  Pulmonary/Chest: Effort normal and breath sounds normal. He has no wheezes. He has no rales.   Abdominal: Soft. Bowel sounds are normal. He exhibits no distension. There is no tenderness.   Musculoskeletal: He exhibits no edema.   Neurological: He is alert and oriented to person, place, and time.   Skin: Skin is warm and dry.   Nursing  note and vitals reviewed.  Left foot: Covered with dressing.       Results Review:       Results from last 7 days  Lab Units 04/20/17  0633 04/19/17  0607 04/18/17 0529 04/17/17  1741   SODIUM mmol/L 137 142 138 131*   POTASSIUM mmol/L 4.3 4.5 4.7 4.6   CHLORIDE mmol/L 98 101 99 94*   TOTAL CO2 mmol/L 29.0 29.0 30.0 26.0   BUN mg/dL 11 14 15 16   CREATININE mg/dL 0.75 0.89 0.86 0.83   GLUCOSE mg/dL 90 91 79 73   CALCIUM mg/dL 7.8* 8.1* 8.5 8.9               Results from last 7 days  Lab Units 04/20/17  0633 04/19/17  0607 04/18/17  0529 04/17/17  1741   WBC 10*3/mm3 6.22 5.83 6.52 8.60   HEMOGLOBIN g/dL 12.2* 14.2 13.9 14.0   HEMATOCRIT % 35.9* 42.0 40.7 39.4   PLATELETS 10*3/mm3 122* 129* 135* 151       No results found for: CKTOTAL, CKMB, CKMBINDEX, TROPONINI, TROPONINT    CO2   Date Value Ref Range Status   04/20/2017 29.0 22.0 - 31.0 mmol/L Final              Imaging Results (last 7 days)     Procedure Component Value Units Date/Time    MRI Foot Left With & Without Contrast [[id number]] Collected:  04/18/17 1740     Updated:  04/18/17 1748    Narrative:       Patient Name:  BRIANA ROTHMAN  Patient ID:  [id number]   Ordering:  JOLENE CAR  Attending:  LEXI MANNING  Referring:  JOLENE CAR  ------------------------------------------------    MRI left foot with and without contrast.    HISTORY: Evaluate for osteomyelitis.  Prior examination left foot March 28, 2017.    TECHNIQUE: Multiplanar multisequence images left foot obtained  both prior to and following the intervenous infusion of  gadolinium, 20 mL of ProHance. There is been amputation of the  great toe and distal aspect of the first metatarsal. There is a  small fluid collection adjacent to the stump of the distal first  metatarsal.    The remaining portions of the first metatarsal are unremarkable  with normal marrow signal intensity.    There is bone edema and findings suggesting cortical destruction  of the distal aspect of the  second metatarsal. These findings are  suspicious for bony infection, osteomyelitis.      Impression:       CONCLUSION: Amputation distal first metatarsal and great toe.  Small fluid collection adjacent to the distal aspect of first  metatarsal most likely development of a small postsurgical bursa  at the stump.    Abnormal distal aspect of second metatarsal with bone edema,  subtle enhancement and some questionable cortical destruction.  These findings are suspicious for bony infection, osteomyelitis.    Electronically signed by:  Yonny Montoya MD  4/18/2017 5:47 PM CDT  Workstation: [id number]    IR PICC wo fluoro guidance [[id number]] Resulted:  04/20/17 1139     Updated:  04/20/17 1139    Narrative:       This procedure was auto-finalized with no dictation required.           Blood Culture   Date Value Ref Range Status   04/17/2017 Abnormal Stain (A)  Final   04/17/2017 Staphylococcus, coagulase negative (A)  Final     Comment:     Consistent with contamination at time of collection.       BCID, PCR   Date Value Ref Range Status   04/17/2017 (C) Negative by BCID PCR. Culture to Follow., No organism detected by BCID PCR. Final    Staphylococcus spp, not aureus. Identification by BCID PCR.                    Wound Culture   Date Value Ref Range Status   04/17/2017 Light growth (2+) Escherichia coli (A)  Final        Medication Review:   Current Facility-Administered Medications   Medication Dose Route Frequency Provider Last Rate Last Dose   • bupivacaine (PF) (MARCAINE) 0.5 % injection    PRN Estuardo Fried DPM   28 mL at 04/19/17 1146   • docusate sodium (COLACE) capsule 100 mg  100 mg Oral BID Khushbu Santos APRN   100 mg at 04/20/17 0805   • enoxaparin (LOVENOX) syringe 40 mg  40 mg Subcutaneous Daily Khushbu Santos APRN   40 mg at 04/17/17 2011   • hydrochlorothiazide (HYDRODIURIL) tablet 25 mg  25 mg Oral Daily Khushbu Santos APRN   25 mg at 04/20/17 0805   • HYDROcodone-acetaminophen (NORCO) 7.5-325 MG  per tablet 1 tablet  1 tablet Oral Q6H PRN Estuardo Fried DPM   1 tablet at 04/20/17 0805   • lisinopril (PRINIVIL,ZESTRIL) tablet 20 mg  20 mg Oral Q24H Khushbu Santos, APRN   20 mg at 04/20/17 0805   • Morphine sulfate (PF) injection 1 mg  1 mg Intravenous Q4H PRN Alec Falcon MD   1 mg at 04/18/17 1930   • Morphine sulfate (PF) injection 2 mg  2 mg Intravenous Q4H PRN Alec Falcon MD   2 mg at 04/20/17 0926   • ondansetron (ZOFRAN) tablet 4 mg  4 mg Oral Q6H PRN Khushbu Santos, APRN       • pantoprazole (PROTONIX) EC tablet 40 mg  40 mg Oral Q AM Khushbu Santos, APRN   40 mg at 04/20/17 0624   • Pharmacy to dose vancomycin   Does not apply Continuous PRN Khushbu Santos, APRN       • piperacillin-tazobactam (ZOSYN) 3.375 g in iso-osmotic dextrose 50 ml (premix)  3.375 g Intravenous Q6H Khushbu Santos, APRN 0 mL/hr at 04/18/17 1745 3.375 g at 04/20/17 0624   • sodium chloride 0.9 % flush 1-10 mL  1-10 mL Intravenous PRN Khushbu Santos, APRN   10 mL at 04/20/17 0326   • vancomycin (VANCOCIN) 1,750 mg in sodium chloride 0.9 % 500 mL IVPB  1,750 mg Intravenous Q12H Nakul Sun MD             Assessment/Plan     1.  Osteomyelitis left foot: Status post trans-metatarsophalangeal amputation.  Continue antibiotics.  Patient will require at least 2 weeks of IV antibiotics.  PICC line will be ordered for placement today.  2.  History of CVA: Stable.  3.  History of hypertension: Stable.  4.  Disposition: 1-2 days.    Shayan Morel MD  04/20/17      EMR Dragon/Transcription disclaimer:   Much of this encounter note is an electronic transcription/translation of spoken language to printed text. The electronic translation of spoken language may permit erroneous, or at times, nonsensical words or phrases to be inadvertently transcribed; Although I have reviewed the note for such errors, some may still exist.

## 2017-04-20 NOTE — PROGRESS NOTES
TWO PATIENT IDENTIFIERS WERE USED. CONSENT WAS SIGNED PER PATIENT EDUCATION MATERIAL WAS GIVEN TO PATIENT AND / OR FAMILY. THE PATIENT WAS DRAPED WITH FULL BODY DRAPE AND PATIENT'SRIGHT   ARM WAS PREPPED WITH CHLORAPREP.  ULTRASOUND WAS USED TO LOCALIZE THERIGHT BASILIC VEIN. SUBCUTANEOUS TISSUE AT THE CATHETER SITE WAS INFILTRATED WITH 2% LIDOCAINE. UNDER ULTRASOUND GUIDANCE, THE VEIN WAS ACCESSED WITH A 21GAUGE  NEEDLE. AN 0.018 WIRE WAS THEN THREADED THROUGH THE NEEDLE INTO THE CENTRAL VENOUS SYSTEM. THE 21GAUGE  NEEDLE WAS REMOVED AND A 6 FRENCHPEEL AWAY SHEATH WAS PLACED OVER THE WIRE. THE PICC LINE CATHETER WAS CUT AT 42 CM. THE PICC LINE CATHETER WAS THEN PLACED OVER THE WIRE INTO THE VEIN, THE SHEATH WAS PEELED AWAY,WIRE WAS REMOVED. CATHETER WAS FLUSHED WITH NORMAL SALINE AND TIPS APPLIED. BIOPATCH PLACED. CATHETER SECURED WITHSTATLOCK  AND TEGADERM. PATIENT TOLERATED PROCEDURE WELL. THIS WAS DONE IN   ANGIOSUITE      IMPRESSION: SUCCESSFUL PLACEMENT OF TRIPLE LUMEN PICC        Stella Patiño  4/20/2017  11:38 AM

## 2017-04-21 LAB
ANION GAP SERPL CALCULATED.3IONS-SCNC: 8 MMOL/L (ref 5–15)
BASOPHILS # BLD AUTO: 0.02 10*3/MM3 (ref 0–0.2)
BASOPHILS NFR BLD AUTO: 0.3 % (ref 0–2)
BUN BLD-MCNC: 9 MG/DL (ref 7–21)
BUN/CREAT SERPL: 12.2 (ref 7–25)
CALCIUM SPEC-SCNC: 8 MG/DL (ref 8.4–10.2)
CHLORIDE SERPL-SCNC: 100 MMOL/L (ref 95–110)
CO2 SERPL-SCNC: 30 MMOL/L (ref 22–31)
CREAT BLD-MCNC: 0.74 MG/DL (ref 0.7–1.3)
DEPRECATED RDW RBC AUTO: 42.4 FL (ref 35.1–43.9)
EOSINOPHIL # BLD AUTO: 0.49 10*3/MM3 (ref 0–0.7)
EOSINOPHIL NFR BLD AUTO: 7 % (ref 0–7)
ERYTHROCYTE [DISTWIDTH] IN BLOOD BY AUTOMATED COUNT: 12.4 % (ref 11.5–14.5)
GFR SERPL CREATININE-BSD FRML MDRD: 109 ML/MIN/1.73 (ref 60–130)
GLUCOSE BLD-MCNC: 87 MG/DL (ref 60–100)
HCT VFR BLD AUTO: 35.2 % (ref 39–49)
HGB BLD-MCNC: 12.3 G/DL (ref 13.7–17.3)
IMM GRANULOCYTES # BLD: 0.02 10*3/MM3 (ref 0–0.02)
IMM GRANULOCYTES NFR BLD: 0.3 % (ref 0–0.5)
LAB AP CASE REPORT: NORMAL
LAB AP CLINICAL INFORMATION: NORMAL
LYMPHOCYTES # BLD AUTO: 2.24 10*3/MM3 (ref 0.6–4.2)
LYMPHOCYTES NFR BLD AUTO: 31.8 % (ref 10–50)
Lab: NORMAL
MCH RBC QN AUTO: 32.9 PG (ref 26.5–34)
MCHC RBC AUTO-ENTMCNC: 34.9 G/DL (ref 31.5–36.3)
MCV RBC AUTO: 94.1 FL (ref 80–98)
MONOCYTES # BLD AUTO: 0.71 10*3/MM3 (ref 0–0.9)
MONOCYTES NFR BLD AUTO: 10.1 % (ref 0–12)
NEUTROPHILS # BLD AUTO: 3.56 10*3/MM3 (ref 2–8.6)
NEUTROPHILS NFR BLD AUTO: 50.5 % (ref 37–80)
PATH REPORT.FINAL DX SPEC: NORMAL
PATH REPORT.GROSS SPEC: NORMAL
PLATELET # BLD AUTO: 123 10*3/MM3 (ref 150–450)
PMV BLD AUTO: 10.8 FL (ref 8–12)
POTASSIUM BLD-SCNC: 3.8 MMOL/L (ref 3.5–5.1)
RBC # BLD AUTO: 3.74 10*6/MM3 (ref 4.37–5.74)
SODIUM BLD-SCNC: 138 MMOL/L (ref 137–145)
WBC NRBC COR # BLD: 7.04 10*3/MM3 (ref 3.2–9.8)

## 2017-04-21 PROCEDURE — 25010000002 MORPHINE SULFATE (PF) 2 MG/ML SOLUTION: Performed by: FAMILY MEDICINE

## 2017-04-21 PROCEDURE — 80048 BASIC METABOLIC PNL TOTAL CA: CPT | Performed by: NURSE PRACTITIONER

## 2017-04-21 PROCEDURE — 25010000002 PIPERACILLIN SOD-TAZOBACTAM PER 1 G: Performed by: NURSE PRACTITIONER

## 2017-04-21 PROCEDURE — 25010000002 CEFTRIAXONE: Performed by: INTERNAL MEDICINE

## 2017-04-21 PROCEDURE — 25010000002 ENOXAPARIN PER 10 MG: Performed by: NURSE PRACTITIONER

## 2017-04-21 PROCEDURE — 85025 COMPLETE CBC W/AUTO DIFF WBC: CPT | Performed by: NURSE PRACTITIONER

## 2017-04-21 PROCEDURE — 25010000002 VANCOMYCIN PER 500 MG: Performed by: INTERNAL MEDICINE

## 2017-04-21 PROCEDURE — 99024 POSTOP FOLLOW-UP VISIT: CPT | Performed by: PODIATRIST

## 2017-04-21 RX ORDER — CEFTRIAXONE 1 G/1
2 INJECTION, POWDER, FOR SOLUTION INTRAMUSCULAR; INTRAVENOUS DAILY
Qty: 20 G | Refills: 0 | Status: SHIPPED | OUTPATIENT
Start: 2017-04-21 | End: 2017-06-22

## 2017-04-21 RX ORDER — HYDROCODONE BITARTRATE AND ACETAMINOPHEN 7.5; 325 MG/1; MG/1
1 TABLET ORAL EVERY 6 HOURS PRN
Qty: 30 TABLET | Refills: 0 | Status: SHIPPED | OUTPATIENT
Start: 2017-04-21 | End: 2017-04-29

## 2017-04-21 RX ORDER — PSEUDOEPHEDRINE HCL 30 MG
100 TABLET ORAL 2 TIMES DAILY
Qty: 30 CAPSULE | Refills: 1 | Status: SHIPPED | OUTPATIENT
Start: 2017-04-21 | End: 2017-04-24 | Stop reason: HOSPADM

## 2017-04-21 RX ADMIN — TAZOBACTAM SODIUM AND PIPERACILLIN SODIUM 3.38 G: 375; 3 INJECTION, SOLUTION INTRAVENOUS at 06:49

## 2017-04-21 RX ADMIN — SODIUM CHLORIDE, PRESERVATIVE FREE 10 ML: 5 INJECTION INTRAVENOUS at 09:00

## 2017-04-21 RX ADMIN — TAZOBACTAM SODIUM AND PIPERACILLIN SODIUM 3.38 G: 375; 3 INJECTION, SOLUTION INTRAVENOUS at 01:19

## 2017-04-21 RX ADMIN — HYDROCODONE BITARTRATE AND ACETAMINOPHEN 1 TABLET: 7.5; 325 TABLET ORAL at 01:19

## 2017-04-21 RX ADMIN — VANCOMYCIN HYDROCHLORIDE 1750 MG: 1 INJECTION, POWDER, LYOPHILIZED, FOR SOLUTION INTRAVENOUS at 02:27

## 2017-04-21 RX ADMIN — HYDROCODONE BITARTRATE AND ACETAMINOPHEN 1 TABLET: 7.5; 325 TABLET ORAL at 08:40

## 2017-04-21 RX ADMIN — PANTOPRAZOLE SODIUM 40 MG: 40 TABLET, DELAYED RELEASE ORAL at 05:31

## 2017-04-21 RX ADMIN — HYDROCHLOROTHIAZIDE 25 MG: 25 TABLET ORAL at 08:40

## 2017-04-21 RX ADMIN — DOCUSATE SODIUM 100 MG: 100 CAPSULE, LIQUID FILLED ORAL at 08:41

## 2017-04-21 RX ADMIN — MORPHINE SULFATE 2 MG: 2 INJECTION, SOLUTION INTRAMUSCULAR; INTRAVENOUS at 05:31

## 2017-04-21 RX ADMIN — TAZOBACTAM SODIUM AND PIPERACILLIN SODIUM 3.38 G: 375; 3 INJECTION, SOLUTION INTRAVENOUS at 12:51

## 2017-04-21 RX ADMIN — HYDROCODONE BITARTRATE AND ACETAMINOPHEN 1 TABLET: 7.5; 325 TABLET ORAL at 14:16

## 2017-04-21 RX ADMIN — LISINOPRIL 20 MG: 20 TABLET ORAL at 08:40

## 2017-04-21 RX ADMIN — MORPHINE SULFATE 2 MG: 2 INJECTION, SOLUTION INTRAMUSCULAR; INTRAVENOUS at 20:24

## 2017-04-21 RX ADMIN — CEFTRIAXONE 1 G: 1 INJECTION, POWDER, FOR SOLUTION INTRAMUSCULAR; INTRAVENOUS at 17:48

## 2017-04-21 RX ADMIN — SODIUM CHLORIDE, PRESERVATIVE FREE 10 ML: 5 INJECTION INTRAVENOUS at 20:25

## 2017-04-21 RX ADMIN — VANCOMYCIN HYDROCHLORIDE 1750 MG: 1 INJECTION, POWDER, LYOPHILIZED, FOR SOLUTION INTRAVENOUS at 12:54

## 2017-04-21 RX ADMIN — DOCUSATE SODIUM 100 MG: 100 CAPSULE, LIQUID FILLED ORAL at 17:48

## 2017-04-21 NOTE — DISCHARGE SUMMARY
[facility]  DISCHARGE SUMMARY       Date of Admission: 4/17/2017  Date of Discharge:  4/24/2017  Primary Care Physician: BROOKLYNN Gu    Presenting Problem/History of Present Illness:  Ms. Wild is a 56-year-old male with history of hypertension and previous CVA who was electively admitted due to chronic left foot ulcers.  Patient had had a recent amputation of his left great toe due to chronic ulcers arising from peripheral vascular disease.  His vital signs on assessment were stable.  Significant findings on physical examination did show evidence of chronic nonhealing ulcers on the left foot.    Patient stated he has some difficulties with speech arising from previous stroke.         Final Discharge Diagnoses:  Hospital Problem List     Osteomyelitis of ankle or foot, acute          Consults:   Consults     Date and Time Order Name Status Description    4/17/2017 1724 Inpatient Consult to Podiatry Completed           Procedures Performed: Procedure(s):  LEFT FOOT TRANSMETATARSAL AMPUTATION, TENDO ACHILLES LENGTHENING.                Pertinent Test Results: MRI left foot that was positive for osteomyelitis.    Chief Complaint on Day of Discharge: None.    Hospital Course:  The patient is a 56-year-old male and electively admitted due to chronic nonhealing ulcers on the left foot..    He was admitted and commenced on broad-spectrum antibiotics.  Blood cultures showed growth of coagulase-negative staph aureus which was thought to be contaminant.  Wound cultures showed light growth of the staph aureus.  He had an MRI of the left foot which showed osteomyelitis.  He was seen by the podiatrist on consult and had transmetatarsal amputation of the left foot.  Postoperatively he was continued on antibiotics and wound dressing as well as his outpatient medications.  He is being discharged home today to continue IV antibiotics at home for a total of 2  "weeks.  He'll be seen routinely by the podiatrist and have dressing changes done as outpatient.    Condition on Discharge:  Stable and improved.    Physical Exam on Discharge:  /70 (BP Location: Left arm, Patient Position: Lying)  Pulse (!) 49  Temp 97 °F (36.1 °C) (Oral)   Resp 18  Ht 70\" (177.8 cm)  Wt 213 lb 3.2 oz (96.7 kg)  SpO2 95%  BMI 30.59 kg/m2  Physical Exam   Constitutional: He is oriented to person, place, and time.   Cardiovascular: Normal rate, regular rhythm and normal heart sounds.  Exam reveals no gallop.    No murmur heard.  Pulmonary/Chest: Effort normal and breath sounds normal. He has no wheezes. He has no rales.   Abdominal: Soft. Bowel sounds are normal. He exhibits no distension. There is no tenderness.   Neurological: He is alert and oriented to person, place, and time.   Skin: Skin is warm and dry.   Nursing note and vitals reviewed.   Extremities: Left foot is covered with dressing..      Discharge Disposition:  [facility]    Discharge Medications:   Damian Wild   Home Medication Instructions MICK:[id number]    Printed on:04/21/17 1312   Medication Information                      docusate sodium 100 MG capsule  Take 100 mg by mouth 2 (Two) Times a Day.             HYDROcodone-acetaminophen (NORCO) 7.5-325 MG per tablet  Take 1 tablet by mouth Every 6 (Six) Hours As Needed for Moderate Pain (4-6) for up to 8 days.             lisinopril-hydrochlorothiazide (PRINZIDE,ZESTORETIC) 20-25 MG per tablet               piperacillin-tazobactam (ZOSYN) 3-0.375 GM/50ML IVPB  Infuse 50 mL into a venous catheter Every 6 (Six) Hours for 10 days. Indications: Skin and Soft Tissue Infection             traMADol (ULTRAM) 50 MG tablet  Take 1 tablet by mouth Every 6 (Six) Hours As Needed for Moderate Pain (4-6) (pain). Take one to two every 4-6 hours prn pain.             vancomycin 1,750 mg in sodium chloride 0.9 % 500 mL IVPB  Infuse 1,750 mg into a venous catheter Every 12 (Twelve) " Hours for 10 days. Indications: Bone and/or Joint Infection                 Discharge Diet:   Diet Instructions     Cardiac diet.               Activity at Discharge:  As tolerated.    Discharge Care Plan/Instructions: Done.    Follow-up Appointments: Follow-up with the podiatrist and PCP as outpatient.  No future appointments.    Test Results Pending at Discharge:    Order Current Status    Tissue Exam In process    Blood Culture Preliminary result          Shayan Morel MD  04/21/17  1:12 PM    Time: 55 minutes.      Addendum: Discharge summary fevers completed by Dr. Morel.  However, discharge was delayed at that point.  Discharge date changed to 4/24/2017 and disposition to [facility].

## 2017-04-21 NOTE — DISCHARGE PLACEMENT REQUEST
"Bharati  [facility]  [phone] phone  [phone] fax               StephanBriana (56 y.o. Male)     Date of Birth Social Security Number Address Home Phone MRN    1960  [address] [place] [address]  [address]  [facility] [address] [phone] [id number]    Sabianist Marital Status          Zoroastrian        Admission Date Admission Type Admitting Provider Attending Provider Department, Room/Bed    4/17/17 Urgent Nakul Sun MD Patel, Shirishkumar N, MD [address], 372/1    Discharge Date Discharge Disposition Discharge Destination         Home-Health Care [facility]             Attending Provider: Nakul Sun MD     Allergies:  No Known Allergies    Isolation:  None   Infection:  None   Code Status:  Conditional    Ht:  70\" (177.8 cm)   Wt:  213 lb 3.2 oz (96.7 kg)    Admission Cmt:  None   Principal Problem:  None                Active Insurance as of 4/17/2017     Primary Coverage     Payor Plan Insurance Group Employer/Plan Group    MEDICARE MEDICARE A & B      Payor Plan Address Payor Plan Phone Number Effective From Effective To    [address] [phone] 12/1/1992     [place] [address]       Subscriber Name Subscriber Birth Date Member ID       STEPHANBRIANA KWOK 1960 [id number]           Secondary Coverage     Payor Plan Insurance Group Employer/Plan Group    [place]      Payor Plan Address Payor Plan Phone Number Effective From Effective To    [address] [phone] 1/5/2017     [place] [address]       Subscriber Name Subscriber Birth Date Member ID       BRIANA ROTHMAN 1960 [id number]                 Emergency Contacts      (Rel.) Home Phone Work Phone Mobile Phone    Roxi Rothman (Father) [phone] -- --            Insurance Information                MEDICARE/MEDICARE A & B Phone: [phone]    Subscriber: Briana Rothman Subscriber#: [id number]    Group#:  " Precert#:         [place] Phone: [phone]    Subscriber: Damian Wild Subscriber#: [id number]    Group#:  Precert#:              History & Physical      BROOKLYNN Szymanski at 4/17/2017  5:24 PM     Attestation signed by Shayan Morel MD at 4/18/2017  4:03 PM        I have reviewed the documents and agree with plan:  CV: Normal S1 and S2.  Lungs: Clear  Extremities: Ulcers noted on left foot.                                        [facility] Medicine Admission      Date of Admission: 4/17/2017      Primary Care Physician: BROOKLYNN Gu      Chief Complaint: Foot ulcer    HPI: 56-year-old  male who presented as a direct admission from Dr. Fried's office related to chronic foot ulcers.  The patient recently underwent surgery to amputate his left great toe related to his chronic ulcerations from poor circulation.  The patient has a history of stroke and has difficulty with his speech so history is slightly difficult to obtain however I am able to gather that the patient went to Dr. Fried's office for follow-up and wounds are not healing as expected.  He has been direct admitted to rule out osteomyelitis and receive IV antibiotic therapy.    Concurrent Medical History:  has a past medical history of Hypertension and Stroke.    Past Surgical History: Tonsillectomy, amputation of all toes on right foot, great toe amputation on left foot    Family History: Patient states that he is unsure of his family history.    Social History: Patient denies alcohol or illicit drug use.  Patient is a former smoker of at least a pack a day for 30+ years.  He reports quitting 5 months ago.    Allergies: No Known Allergies    Medications: Scheduled Meds:  docusate sodium 100 mg Oral BID   enoxaparin 40 mg Subcutaneous Daily   hydrochlorothiazide 25 mg Oral Daily   lisinopril 20 mg Oral Q24H   [START ON 4/18/2017] pantoprazole 40 mg Oral  Q AM   piperacillin-tazobactam 3.375 g Intravenous Q6H     Continuous Infusions:  Pharmacy to dose vancomycin    sodium chloride 75 mL/hr     PRN Meds:.•  acetaminophen  •  Morphine  •  Morphine  •  ondansetron  •  Pharmacy to dose vancomycin  •  sodium chloride    Review of Systems:  Review of Systems   Constitutional: Negative for fever.   Respiratory: Negative for shortness of breath.    Cardiovascular: Negative for chest pain.   Gastrointestinal: Negative for nausea and vomiting.   Musculoskeletal: Negative for back pain and neck pain.   Skin: Positive for wound.        Wounds to left second toe, lateral aspect of left foot, and on bottom of patient's foot with sanguinous and purulent drainage noted to the patient's dressing.   Neurological: Negative for weakness.   Psychiatric/Behavioral: Negative for confusion.      Review of systems is difficult to obtain due to patient's aphasia related to CVA.    Physical Exam:   Temp:  [98 °F (36.7 °C)] 98 °F (36.7 °C)  Heart Rate:  [75] 75  Resp:  [18] 18  BP: (127)/(59) 127/59  Physical Exam   Constitutional: He is oriented to person, place, and time. He appears well-developed and well-nourished.   HENT:   Head: Normocephalic.   Eyes: Conjunctivae are normal.   Neck: Neck supple.   Cardiovascular: Normal rate and normal heart sounds.    Pulmonary/Chest: Effort normal and breath sounds normal. No respiratory distress. He has no wheezes. He has no rales. He exhibits no tenderness.   Abdominal: Soft. Bowel sounds are normal. He exhibits no distension. There is no tenderness.   Musculoskeletal: Normal range of motion.        Neurological: He is alert and oriented to person, place, and time.   Skin: Skin is warm.   Foot ulceration x3 with sanguinous and purulent drainage noted.    Psychiatric: He has a normal mood and affect. His behavior is normal.   Vitals reviewed.        Results Reviewed:  I have personally reviewed current lab, radiology, and data and agree with  results.  Lab Results (last 24 hours)     ** No results found for the last 24 hours. **        Imaging Results (last 24 hours)     ** No results found for the last 24 hours. **            Assessment:    Hospital Problem List     Osteomyelitis of ankle or foot, acute                Plan:  #1 chronic foot ulcerations/possible osteomyelitis: Podiatry as been consult.  Wound and blood cultures have been obtained.  MRI will be performed to rule out osteomyelitis.  Vancomycin per pharmacy dosing as well as Zosyn have been ordered for broad-spectrum coverage.  Wound Center consult was also been placed.  #2 hypertension: Continue patient's home doses of lisinopril and HCTZ.  #3 history of CVA    Protonix for GI prophylaxis and Lovenox for DVT prophylaxis.    I discussed the patients findings and my recommendations with the patient.  Further orders on this patient will depend upon the hospital course.  Approximately 45 minutes was spent on the admission process for this patient.  Code status was discussed with the patient and he wishes to be DNR code status.    BROOKLYNN Szymanski  04/17/17  5:25 PM                   Electronically signed by Shayan Morel MD at 4/18/2017  4:03 PM      Estuardo Car DPM at 4/18/2017  9:18 PM          Inpatient Consult to Podiatry  Consult performed by: ESTUARDO CAR  Consult ordered by: LOW BYRD  Reason for consult: Left foot infection        Patient Care Team:  BROOKLYNN Gu as PCP - General    Chief complaint: Left foot infection, pain.    History of Present Illness  Mr Wild is a 55 y/o male who is seen for evaluation of left foot infection. He is well known to me and underwent left partial 1st ray amputation in December of 2016. He healed well from that amputation, but subsequently developed 2 additional wounds to the left foot which we have been following weekly and utilizing home care for dressing changes. I sent the patient for admission yesterday  following his presentation to clinic with worsening foot pain, edema and erythema of his forefoot. At exam today, pain is his primary complain. Denies N/V/F/C/SOB.    Review of Systems     Constitutional:  Denies recent weight loss, weight gain, fever or chills, no change in exercise tolerance  Musculoskeletal: Toe/foot pain.   Skin: Foot ulcer.  Neurological: Denies paresthesias.  Psychiatric/Behavioral: Denies depression       Vital Signs  Temp:  [96.6 °F (35.9 °C)-97.6 °F (36.4 °C)] 97.4 °F (36.3 °C)  Heart Rate:  [49-65] 65  Resp:  [18] 18  BP: (126-150)/(64-86) 136/71    Physical Exam   Constitutional: he appears well-developed and well-nourished.   Psychiatric: he has a normal mood and affect. his  behavior is normal.     Lower Extremity Exam:  Vascular: DP/PT pulses palpable 1+.   Negative hair growth.   Minimal left foot edema  Feet cool to touch  Neuro: Protective sensation absent, b/l.  DTRs intact  Integument:   Atrophic skin noted b/l  Left partial 1st ray incision now healed  Left lateral 5th met base 3.0 x 2.0 x 0.3 with fibrogranular base. No fluctuance. No drainage, mild periwound erythema +pain on palpation  Plantar 2nd mpj ulceration on left. 2.6 x 1.6 x 0.3 cm with fibronecrotic base and surrounding HPK. No SOI. No PTB  Left 2nd digit edematous, fluctuant  Musculoskeletal: LE muscle strength 5/5.   Gait normal  Semi-rigid hammertoe deformity toes 2-5 on left  Right TMA    Results Review:   .  WBC   Date Value Ref Range Status   04/18/2017 6.52 3.20 - 9.80 10*3/mm3 Final     RBC   Date Value Ref Range Status   04/18/2017 4.26 (L) 4.37 - 5.74 10*6/mm3 Final     Hemoglobin   Date Value Ref Range Status   04/18/2017 13.9 13.7 - 17.3 g/dL Final     Hematocrit   Date Value Ref Range Status   04/18/2017 40.7 39.0 - 49.0 % Final     MCV   Date Value Ref Range Status   04/18/2017 95.5 80.0 - 98.0 fL Final     MCH   Date Value Ref Range Status   04/18/2017 32.6 26.5 - 34.0 pg Final     [facility]   Date Value Ref  Range Status   04/18/2017 34.2 31.5 - 36.3 g/dL Final     RDW   Date Value Ref Range Status   04/18/2017 12.8 11.5 - 14.5 % Final     RDW-SD   Date Value Ref Range Status   04/18/2017 44.1 (H) 35.1 - 43.9 fl Final     MPV   Date Value Ref Range Status   04/18/2017 10.7 8.0 - 12.0 fL Final     Platelets   Date Value Ref Range Status   04/18/2017 135 (L) 150 - 450 10*3/mm3 Final     Neutrophil %   Date Value Ref Range Status   04/18/2017 44.0 37.0 - 80.0 % Final     Lymphocyte %   Date Value Ref Range Status   04/18/2017 32.5 10.0 - 50.0 % Final     Monocyte %   Date Value Ref Range Status   04/18/2017 17.9 (H) 0.0 - 12.0 % Final     Eosinophil %   Date Value Ref Range Status   04/18/2017 5.1 0.0 - 7.0 % Final     Basophil %   Date Value Ref Range Status   04/18/2017 0.3 0.0 - 2.0 % Final     Immature Grans %   Date Value Ref Range Status   04/18/2017 0.2 0.0 - 0.5 % Final     Neutrophils, Absolute   Date Value Ref Range Status   04/18/2017 2.87 2.00 - 8.60 10*3/mm3 Final     Lymphocytes, Absolute   Date Value Ref Range Status   04/18/2017 2.12 0.60 - 4.20 10*3/mm3 Final     Monocytes, Absolute   Date Value Ref Range Status   04/18/2017 1.17 (H) 0.00 - 0.90 10*3/mm3 Final     Eosinophils, Absolute   Date Value Ref Range Status   04/18/2017 0.33 0.00 - 0.70 10*3/mm3 Final     Basophils, Absolute   Date Value Ref Range Status   04/18/2017 0.02 0.00 - 0.20 10*3/mm3 Final     Immature Grans, Absolute   Date Value Ref Range Status   04/18/2017 0.01 0.00 - 0.02 10*3/mm3 Final       Assessment & Plan  1. Left foot abscess, cellulitis  2. Left foot full thickness ulceration  3. Polyneuropathy  4. Right foot TMA    Dressing changed. MRI images reviewed, pt seems to have local abscess at base of 2nd digit with questionable underlying osteomyelitis. Discussed need for I&D vs digital amputation. Also discussed TMA which will likely give patient best chance of healing long term. Patient agrees with this treatment plan. All risks,  benefits and potential complications discussed including, but not limited to delayed healing, risk of infection, need for further amputation. Will attempt to add to surgical schedule tomorrow AM. NPO after midnight.      I discussed the patients findings and my recommendations with patient          This document has been electronically signed by Jolene Car DPM on April 18, 2017 9:37 PM     Jolene Car DPM  04/18/17  9:18 PM             Electronically signed by Jolene Car DPM at 4/18/2017  9:37 PM      Jolene Car DPM at 4/19/2017 10:53 AM            H&P reviewed. The patient was examined and there are no changes to the H&P. Evaluated in same day surgery. Proceed with left TMA, achilles lengthening, possible grafting of foot ulcer.          This document has been electronically signed by Jolene Car DPM on April 19, 2017 10:53 AM            Electronically signed by Jolene Car DPM at 4/19/2017 10:53 AM    Source Note             Inpatient Consult to Podiatry  Consult performed by: JOLENE CAR  Consult ordered by: LOW BYRD  Reason for consult: Left foot infection        Patient Care Team:  BROOKLYNN Gu as PCP - General    Chief complaint: Left foot infection, pain.    History of Present Illness  Mr Wild is a 57 y/o male who is seen for evaluation of left foot infection. He is well known to me and underwent left partial 1st ray amputation in December of 2016. He healed well from that amputation, but subsequently developed 2 additional wounds to the left foot which we have been following weekly and utilizing home care for dressing changes. I sent the patient for admission yesterday following his presentation to clinic with worsening foot pain, edema and erythema of his forefoot. At exam today, pain is his primary complain. Denies N/V/F/C/SOB.    Review of Systems     Constitutional:  Denies recent weight loss, weight gain, fever or chills, no change in  exercise tolerance  Musculoskeletal: Toe/foot pain.   Skin: Foot ulcer.  Neurological: Denies paresthesias.  Psychiatric/Behavioral: Denies depression       Vital Signs  Temp:  [96.6 °F (35.9 °C)-97.6 °F (36.4 °C)] 97.4 °F (36.3 °C)  Heart Rate:  [49-65] 65  Resp:  [18] 18  BP: (126-150)/(64-86) 136/71    Physical Exam   Constitutional: he appears well-developed and well-nourished.   Psychiatric: he has a normal mood and affect. his  behavior is normal.     Lower Extremity Exam:  Vascular: DP/PT pulses palpable 1+.   Negative hair growth.   Minimal left foot edema  Feet cool to touch  Neuro: Protective sensation absent, b/l.  DTRs intact  Integument:   Atrophic skin noted b/l  Left partial 1st ray incision now healed  Left lateral 5th met base 3.0 x 2.0 x 0.3 with fibrogranular base. No fluctuance. No drainage, mild periwound erythema +pain on palpation  Plantar 2nd mpj ulceration on left. 2.6 x 1.6 x 0.3 cm with fibronecrotic base and surrounding HPK. No SOI. No PTB  Left 2nd digit edematous, fluctuant  Musculoskeletal: LE muscle strength 5/5.   Gait normal  Semi-rigid hammertoe deformity toes 2-5 on left  Right TMA    Results Review:   .  WBC   Date Value Ref Range Status   04/18/2017 6.52 3.20 - 9.80 10*3/mm3 Final     RBC   Date Value Ref Range Status   04/18/2017 4.26 (L) 4.37 - 5.74 10*6/mm3 Final     Hemoglobin   Date Value Ref Range Status   04/18/2017 13.9 13.7 - 17.3 g/dL Final     Hematocrit   Date Value Ref Range Status   04/18/2017 40.7 39.0 - 49.0 % Final     MCV   Date Value Ref Range Status   04/18/2017 95.5 80.0 - 98.0 fL Final     MCH   Date Value Ref Range Status   04/18/2017 32.6 26.5 - 34.0 pg Final     MCHC   Date Value Ref Range Status   04/18/2017 34.2 31.5 - 36.3 g/dL Final     RDW   Date Value Ref Range Status   04/18/2017 12.8 11.5 - 14.5 % Final     RDW-SD   Date Value Ref Range Status   04/18/2017 44.1 (H) 35.1 - 43.9 fl Final     MPV   Date Value Ref Range Status   04/18/2017 10.7 8.0 -  12.0 fL Final     Platelets   Date Value Ref Range Status   04/18/2017 135 (L) 150 - 450 10*3/mm3 Final     Neutrophil %   Date Value Ref Range Status   04/18/2017 44.0 37.0 - 80.0 % Final     Lymphocyte %   Date Value Ref Range Status   04/18/2017 32.5 10.0 - 50.0 % Final     Monocyte %   Date Value Ref Range Status   04/18/2017 17.9 (H) 0.0 - 12.0 % Final     Eosinophil %   Date Value Ref Range Status   04/18/2017 5.1 0.0 - 7.0 % Final     Basophil %   Date Value Ref Range Status   04/18/2017 0.3 0.0 - 2.0 % Final     Immature Grans %   Date Value Ref Range Status   04/18/2017 0.2 0.0 - 0.5 % Final     Neutrophils, Absolute   Date Value Ref Range Status   04/18/2017 2.87 2.00 - 8.60 10*3/mm3 Final     Lymphocytes, Absolute   Date Value Ref Range Status   04/18/2017 2.12 0.60 - 4.20 10*3/mm3 Final     Monocytes, Absolute   Date Value Ref Range Status   04/18/2017 1.17 (H) 0.00 - 0.90 10*3/mm3 Final     Eosinophils, Absolute   Date Value Ref Range Status   04/18/2017 0.33 0.00 - 0.70 10*3/mm3 Final     Basophils, Absolute   Date Value Ref Range Status   04/18/2017 0.02 0.00 - 0.20 10*3/mm3 Final     Immature Grans, Absolute   Date Value Ref Range Status   04/18/2017 0.01 0.00 - 0.02 10*3/mm3 Final       Assessment & Plan  1. Left foot abscess, cellulitis  2. Left foot full thickness ulceration  3. Polyneuropathy  4. Right foot TMA    Dressing changed. MRI images reviewed, pt seems to have local abscess at base of 2nd digit with questionable underlying osteomyelitis. Discussed need for I&D vs digital amputation. Also discussed TMA which will likely give patient best chance of healing long term. Patient agrees with this treatment plan. All risks, benefits and potential complications discussed including, but not limited to delayed healing, risk of infection, need for further amputation. Will attempt to add to surgical schedule tomorrow AM. NPO after midnight.      I discussed the patients findings and my  recommendations with patient          This document has been electronically signed by Estuardo Fried DPM on April 18, 2017 9:37 PM     Estuardo Fried DPM  04/18/17  9:18 PM              Electronically signed by Estuardo Fried DPM at 4/18/2017  9:37 PM              Lab Results (all)     Procedure Component Value Units Date/Time    CBC & Differential [[id number]] Collected:  04/17/17 1741    Specimen:  Blood Updated:  04/17/17 1808    Narrative:       The following orders were created for panel order CBC & Differential.  Procedure                               Abnormality         Status                     ---------                               -----------         ------                     CBC Auto Differential[[id number]]         Abnormal            Final result                 Please view results for these tests on the individual orders.    CBC Auto Differential [[id number]]  (Abnormal) Collected:  04/17/17 1741    Specimen:  Blood Updated:  04/17/17 1808     WBC 8.60 10*3/mm3      RBC 4.16 (L) 10*6/mm3      Hemoglobin 14.0 g/dL      Hematocrit 39.4 %      MCV 94.7 fL      MCH 33.7 pg      MCHC 35.5 g/dL      RDW 12.4 %      RDW-SD 43.0 fl      MPV 10.5 fL      Platelets 151 10*3/mm3      Neutrophil % 60.0 %      Lymphocyte % 23.7 %      Monocyte % 14.0 (H) %      Eosinophil % 1.9 %      Basophil % 0.2 %      Immature Grans % 0.2 %      Neutrophils, Absolute 5.16 10*3/mm3      Lymphocytes, Absolute 2.04 10*3/mm3      Monocytes, Absolute 1.20 (H) 10*3/mm3      Eosinophils, Absolute 0.16 10*3/mm3      Basophils, Absolute 0.02 10*3/mm3      Immature Grans, Absolute 0.02 10*3/mm3     Lactic Acid, Plasma [[id number]]  (Abnormal) Collected:  04/17/17 1740    Specimen:  Blood Updated:  04/17/17 1832     Lactate 3.0 (C) mmol/L     Basic Metabolic Panel [[id number]]  (Abnormal) Collected:  04/17/17 1741    Specimen:  Blood Updated:  04/17/17 1846     Glucose 73 mg/dL      BUN 16 mg/dL      Creatinine 0.83 mg/dL       Sodium 131 (L) mmol/L      Potassium 4.6 mmol/L      Chloride 94 (L) mmol/L      CO2 26.0 mmol/L      Calcium 8.9 mg/dL      eGFR Non African Amer 95 mL/min/1.73      BUN/Creatinine Ratio 19.3     Anion Gap 11.0 mmol/L     C-reactive Protein [[id number]]  (Abnormal) Collected:  04/17/17 1741    Specimen:  Blood Updated:  04/17/17 1846     C-Reactive Protein 1.40 (H) mg/dL     Lactate Acid, Reflex [[id number]]  (Normal) Collected:  04/17/17 2045    Specimen:  Blood Updated:  04/17/17 2101     Lactate 0.9 mmol/L     Extra Tubes [[id number]] Collected:  04/17/17 1741    Specimen:  Blood from Blood, Venous Line Updated:  04/17/17 2201    Narrative:       The following orders were created for panel order Extra Tubes.  Procedure                               Abnormality         Status                     ---------                               -----------         ------                     Light Blue Top[[id number]]                                    Final result                 Please view results for these tests on the individual orders.    Light Blue Top [[id number]] Collected:  04/17/17 1741    Specimen:  Blood Updated:  04/17/17 2201     Extra Tube hold for add-on      Auto resulted       Lactic Acid, Plasma [[id number]]  (Normal) Collected:  04/17/17 2322    Specimen:  Blood Updated:  04/17/17 2339     Lactate 0.7 mmol/L     CBC & Differential [[id number]] Collected:  04/18/17 0529    Specimen:  Blood Updated:  04/18/17 0617    Narrative:       The following orders were created for panel order CBC & Differential.  Procedure                               Abnormality         Status                     ---------                               -----------         ------                     CBC Auto Differential[[id number]]         Abnormal            Final result                 Please view results for these tests on the individual orders.    CBC Auto Differential [[id number]]  (Abnormal) Collected:  04/18/17 0529    Specimen:  Blood  Updated:  04/18/17 0617     WBC 6.52 10*3/mm3      RBC 4.26 (L) 10*6/mm3      Hemoglobin 13.9 g/dL      Hematocrit 40.7 %      MCV 95.5 fL      MCH 32.6 pg      MCHC 34.2 g/dL      RDW 12.8 %      RDW-SD 44.1 (H) fl      MPV 10.7 fL      Platelets 135 (L) 10*3/mm3      Neutrophil % 44.0 %      Lymphocyte % 32.5 %      Monocyte % 17.9 (H) %      Eosinophil % 5.1 %      Basophil % 0.3 %      Immature Grans % 0.2 %      Neutrophils, Absolute 2.87 10*3/mm3      Lymphocytes, Absolute 2.12 10*3/mm3      Monocytes, Absolute 1.17 (H) 10*3/mm3      Eosinophils, Absolute 0.33 10*3/mm3      Basophils, Absolute 0.02 10*3/mm3      Immature Grans, Absolute 0.01 10*3/mm3     C-reactive Protein [[id number]]  (Abnormal) Collected:  04/18/17 0529    Specimen:  Blood Updated:  04/18/17 0630     C-Reactive Protein 1.60 (H) mg/dL     Basic Metabolic Panel [[id number]]  (Normal) Collected:  04/18/17 0529    Specimen:  Blood Updated:  04/18/17 0631     Glucose 79 mg/dL      BUN 15 mg/dL      Creatinine 0.86 mg/dL      Sodium 138 mmol/L      Potassium 4.7 mmol/L      Chloride 99 mmol/L      CO2 30.0 mmol/L      Calcium 8.5 mg/dL      eGFR Non African Amer 91 mL/min/1.73      BUN/Creatinine Ratio 17.4     Anion Gap 9.0 mmol/L     Blood Culture ID, PCR [[id number]]  (Abnormal) Collected:  04/17/17 1741    Specimen:  Blood from Arm, Left Updated:  04/18/17 1802     BCID, PCR Staphylococcus spp, not aureus. Identification by BCID PCR. (C)    Narrative:         Sensitivity to Follow    Basic Metabolic Panel [[id number]]  (Abnormal) Collected:  04/19/17 0607    Specimen:  Blood Updated:  04/19/17 0658     Glucose 91 mg/dL      BUN 14 mg/dL      Creatinine 0.89 mg/dL      Sodium 142 mmol/L      Potassium 4.5 mmol/L      Chloride 101 mmol/L      CO2 29.0 mmol/L      Calcium 8.1 (L) mg/dL      eGFR Non African Amer 88 mL/min/1.73      BUN/Creatinine Ratio 15.7     Anion Gap 12.0 mmol/L     CBC & Differential [[id number]] Collected:  04/19/17 0607     Specimen:  Blood Updated:  04/19/17 0701    Narrative:       The following orders were created for panel order CBC & Differential.  Procedure                               Abnormality         Status                     ---------                               -----------         ------                     CBC Auto Differential[[id number]]         Abnormal            Final result                 Please view results for these tests on the individual orders.    CBC Auto Differential [[id number]]  (Abnormal) Collected:  04/19/17 0607    Specimen:  Blood Updated:  04/19/17 0701     WBC 5.83 10*3/mm3      RBC 4.33 (L) 10*6/mm3      Hemoglobin 14.2 g/dL      Hematocrit 42.0 %      MCV 97.0 fL      MCH 32.8 pg      MCHC 33.8 g/dL      RDW 13.1 %      RDW-SD 46.4 (H) fl      MPV 10.7 fL      Platelets 129 (L) 10*3/mm3      Neutrophil % 49.2 %      Lymphocyte % 30.9 %      Monocyte % 11.8 %      Eosinophil % 7.4 (H) %      Basophil % 0.5 %      Immature Grans % 0.2 %      Neutrophils, Absolute 2.87 10*3/mm3      Lymphocytes, Absolute 1.80 10*3/mm3      Monocytes, Absolute 0.69 10*3/mm3      Eosinophils, Absolute 0.43 10*3/mm3      Basophils, Absolute 0.03 10*3/mm3      Immature Grans, Absolute 0.01 10*3/mm3      nRBC 0.0 /100 WBC     Blood Culture [[id number]]  (Abnormal) Collected:  04/17/17 1741    Specimen:  Blood from Arm, Left Updated:  04/19/17 0719     Blood Culture Abnormal Stain (A)      Staphylococcus, coagulase negative (A)      Consistent with contamination at time of collection.          Gram Stain Result Aerobic Bottle Gram positive cocci in clusters    Wound Culture [[id number]]  (Abnormal)  (Susceptibility) Collected:  04/17/17 1753    Specimen:  Wound from Foot, Left Updated:  04/19/17 1030     Wound Culture Light growth (2+) Escherichia coli (A)     Beta Lactamase --     Gram Stain Result Rare (1+) WBCs seen      Rare (1+) Gram positive cocci in pairs    Narrative:       Slide reviewed confirmed    Susceptibility       Escherichia coli     JAUN     Amikacin <=16 ug/ml Susceptible     Ampicillin >16 ug/ml Resistant     Ampicillin + Sulbactam <=8/4 ug/ml Susceptible     Cefazolin <=8 ug/ml Susceptible     Cefoxitin <=8 ug/ml Susceptible     Ceftazidime <=1 ug/ml Susceptible     Ceftriaxone <=8 ug/ml Susceptible     Ciprofloxacin <=1 ug/ml Susceptible     Gentamicin <=4 ug/ml Susceptible     Levofloxacin <=2 ug/ml Susceptible     Piperacillin + Tazobactam <=16 ug/ml Susceptible     Tobramycin <=4 ug/ml Susceptible     Trimethoprim + Sulfamethoxazole <=2/38 ug/ml Susceptible                    Vancomycin, Trough [[id number]]  (Abnormal) Collected:  04/19/17 1317    Specimen:  Blood Updated:  04/19/17 1429     Vancomycin Trough 19.88 (H) mcg/mL     CBC & Differential [[id number]] Collected:  04/20/17 0633    Specimen:  Blood Updated:  04/20/17 0742    Narrative:       The following orders were created for panel order CBC & Differential.  Procedure                               Abnormality         Status                     ---------                               -----------         ------                     CBC Auto Differential[[id number]]         Abnormal            Final result                 Please view results for these tests on the individual orders.    CBC Auto Differential [[id number]]  (Abnormal) Collected:  04/20/17 0633    Specimen:  Blood Updated:  04/20/17 0742     WBC 6.22 10*3/mm3      RBC 3.77 (L) 10*6/mm3      Hemoglobin 12.2 (L) g/dL      Hematocrit 35.9 (L) %      MCV 95.2 fL      MCH 32.4 pg      MCHC 34.0 g/dL      RDW 12.7 %      RDW-SD 43.9 fl      MPV 10.7 fL      Platelets 122 (L) 10*3/mm3      Neutrophil % 56.5 %      Lymphocyte % 28.3 %      Monocyte % 10.5 %      Eosinophil % 4.2 %      Basophil % 0.3 %      Immature Grans % 0.2 %      Neutrophils, Absolute 3.52 10*3/mm3      Lymphocytes, Absolute 1.76 10*3/mm3      Monocytes, Absolute 0.65 10*3/mm3      Eosinophils, Absolute 0.26 10*3/mm3      Basophils, Absolute  0.02 10*3/mm3      Immature Grans, Absolute 0.01 10*3/mm3     Basic Metabolic Panel [[id number]]  (Abnormal) Collected:  04/20/17 0633    Specimen:  Blood Updated:  04/20/17 0752     Glucose 90 mg/dL      BUN 11 mg/dL      Creatinine 0.75 mg/dL      Sodium 137 mmol/L      Potassium 4.3 mmol/L      Chloride 98 mmol/L      CO2 29.0 mmol/L      Calcium 7.8 (L) mg/dL      eGFR Non African Amer 107 mL/min/1.73      BUN/Creatinine Ratio 14.7     Anion Gap 10.0 mmol/L     Tissue Exam [[id number]] Collected:  04/19/17 1215    Specimen:  Tissue from Foot, Left Updated:  04/20/17 1020    Blood Culture [[id number]]  (Abnormal) Collected:  04/17/17 1731    Specimen:  Blood from Arm, Right Updated:  04/21/17 0652     Blood Culture Staphylococcus, coagulase negative (A)     Isolated from Anaerobic Bottle     Gram Stain Result Anaerobic Bottle Gram positive cocci in clusters      two of four; 04/19/2017 0541 alp       CBC & Differential [[id number]] Collected:  04/21/17 0543    Specimen:  Blood Updated:  04/21/17 0700    Narrative:       The following orders were created for panel order CBC & Differential.  Procedure                               Abnormality         Status                     ---------                               -----------         ------                     CBC Auto Differential[[id number]]         Abnormal            Final result                 Please view results for these tests on the individual orders.    CBC Auto Differential [[id number]]  (Abnormal) Collected:  04/21/17 0543    Specimen:  Blood Updated:  04/21/17 0700     WBC 7.04 10*3/mm3      RBC 3.74 (L) 10*6/mm3      Hemoglobin 12.3 (L) g/dL      Hematocrit 35.2 (L) %      MCV 94.1 fL      MCH 32.9 pg      MCHC 34.9 g/dL      RDW 12.4 %      RDW-SD 42.4 fl      MPV 10.8 fL      Platelets 123 (L) 10*3/mm3      Neutrophil % 50.5 %      Lymphocyte % 31.8 %      Monocyte % 10.1 %      Eosinophil % 7.0 %      Basophil % 0.3 %      Immature Grans % 0.3 %       Neutrophils, Absolute 3.56 10*3/mm3      Lymphocytes, Absolute 2.24 10*3/mm3      Monocytes, Absolute 0.71 10*3/mm3      Eosinophils, Absolute 0.49 10*3/mm3      Basophils, Absolute 0.02 10*3/mm3      Immature Grans, Absolute 0.02 10*3/mm3     Basic Metabolic Panel [[id number]]  (Abnormal) Collected:  04/21/17 0543    Specimen:  Blood Updated:  04/21/17 0713     Glucose 87 mg/dL      BUN 9 mg/dL      Creatinine 0.74 mg/dL      Sodium 138 mmol/L      Potassium 3.8 mmol/L      Chloride 100 mmol/L      CO2 30.0 mmol/L      Calcium 8.0 (L) mg/dL      eGFR Non African Amer 109 mL/min/1.73      BUN/Creatinine Ratio 12.2     Anion Gap 8.0 mmol/L         vancomycin 1,750 mg in sodium chloride 0.9 % 500 mL IVPB   Infuse 1,750 mg into a venous catheter Every 12 (Twelve) Hours for 10 days. Indications: Bone and/or Joint Infection   Last time this was given: 4/21/2017 12:54 PM        cefTRIAXone 1 G injection   Infuse 2 g into a venous catheter Daily.   Commonly known as: ROCEPHIN    For 10 days    PT Ht: 70”  PT Wt: 213 lbs.

## 2017-04-21 NOTE — PLAN OF CARE
Problem: Patient Care Overview (Adult)  Goal: Plan of Care Review  Outcome: Ongoing (interventions implemented as appropriate)    04/21/17 0425   Coping/Psychosocial Response Interventions   Plan Of Care Reviewed With patient   Patient Care Overview   Progress improving   Outcome Evaluation   Outcome Summary/Follow up Plan Pain is under control       Goal: Adult Individualization and Mutuality  Outcome: Ongoing (interventions implemented as appropriate)    Problem: Sepsis (Adult)  Goal: Signs and Symptoms of Listed Potential Problems Will be Absent or Manageable (Sepsis)  Outcome: Ongoing (interventions implemented as appropriate)    Problem: Pain, Acute (Adult)  Goal: Acceptable Pain Control/Comfort Level  Outcome: Ongoing (interventions implemented as appropriate)

## 2017-04-21 NOTE — PLAN OF CARE
Problem: Sepsis (Adult)  Goal: Signs and Symptoms of Listed Potential Problems Will be Absent or Manageable (Sepsis)  Outcome: Ongoing (interventions implemented as appropriate)    Problem: Pain, Acute (Adult)  Goal: Acceptable Pain Control/Comfort Level  Outcome: Ongoing (interventions implemented as appropriate)

## 2017-04-21 NOTE — DISCHARGE PLACEMENT REQUEST
"Damian Rothman (56 y.o. Male)     Date of Birth Social Security Number Address Home Phone MRN    [id number]  [address] [place] [address]  [address]  [facility] [address] [phone] [id number]    Uats[place] Marital Status          Scientologist        Admission Date Admission Type Admitting Provider Attending Provider Department, Room/Bed    4/17/17 Urgent Nakul Sun MD Patel, Shirishkumar N, MD [address], 372/1    Discharge Date Discharge Disposition Discharge Destination         Home-Health Care [facility]             Attending Provider: Nakul Sun MD     Allergies:  No Known Allergies    Isolation:  None   Infection:  None   Code Status:  Conditional    Ht:  70\" (177.8 cm)   Wt:  213 lb 3.2 oz (96.7 kg)    Admission Cmt:  None   Principal Problem:  None                Active Insurance as of 4/17/2017     Primary Coverage     Payor Plan Insurance Group Employer/Plan Group    MEDICARE MEDICARE A & B      Payor Plan Address Payor Plan Phone Number Effective From Effective To    [address] [phone] 12/1/1992     [place] [address]       Subscriber Name Subscriber Birth Date Member ID       DAMIAN ROTHMAN 1960 [id number]           Secondary Coverage     Payor Plan Insurance Group Employer/Plan Group    [place]      Payor Plan Address Payor Plan Phone Number Effective From Effective To    [address] [phone] 1/5/2017     [place] [address]       Subscriber Name Subscriber Birth Date Member ID       DAMIAN ROTHMAN 1960 [id number]                 Emergency Contacts      (Rel.) Home Phone Work Phone Mobile Phone    Roxi Rothman (Father) [phone] -- --              "

## 2017-04-21 NOTE — DISCHARGE PLACEMENT REQUEST
"Briana Rothman (56 y.o. Male)     Date of Birth Social Security Number Address Home Phone MRN    [id number]  [address] [place] [address]  [address]  [facility] [address] [phone] [id number]    Judaism Marital Status          Pentecostalism        Admission Date Admission Type Admitting Provider Attending Provider Department, Room/Bed    4/17/17 Urgent Nakul Sun MD Patel, Shirishkumar N, MD [address], 372/1    Discharge Date Discharge Disposition Discharge Destination         Home-Health Care [facility]             Attending Provider: Nakul Sun MD     Allergies:  No Known Allergies    Isolation:  None   Infection:  None   Code Status:  Conditional    Ht:  70\" (177.8 cm)   Wt:  213 lb 3.2 oz (96.7 kg)    Admission Cmt:  None   Principal Problem:  None                Active Insurance as of 4/17/2017     Primary Coverage     Payor Plan Insurance Group Employer/Plan Group    MEDICARE MEDICARE A & B      Payor Plan Address Payor Plan Phone Number Effective From Effective To    [address] [phone] 12/1/1992     [place] [address]       Subscriber Name Subscriber Birth Date Member ID       BRIANA ROTHMAN 1960 [id number]           Secondary Coverage     Payor Plan Insurance Group Employer/Plan Group    [place]      Payor Plan Address Payor Plan Phone Number Effective From Effective To    [address] [phone] 1/5/2017     [place] [address]       Subscriber Name Subscriber Birth Date Member ID       BRIANA ROTHMAN 1960 [id number]                 Emergency Contacts      (Rel.) Home Phone Work Phone Mobile Phone    Roxi Rothman (Father) [phone] -- --               History & Physical      BROOKLYNN Szymanski at 4/17/2017  5:24 PM     Attestation signed by Shayan Morel MD at 4/18/2017  4:03 PM        I have reviewed the documents and agree with plan:  CV: Normal S1 and S2.  Lungs: " Clear  Extremities: Ulcers noted on left foot.                                        [facility] Admission      Date of Admission: 4/17/2017      Primary Care Physician: BROOKLYNN Gu      Chief Complaint: Foot ulcer    HPI: 56-year-old  male who presented as a direct admission from Dr. Fried's office related to chronic foot ulcers.  The patient recently underwent surgery to amputate his left great toe related to his chronic ulcerations from poor circulation.  The patient has a history of stroke and has difficulty with his speech so history is slightly difficult to obtain however I am able to gather that the patient went to Dr. Fried's office for follow-up and wounds are not healing as expected.  He has been direct admitted to rule out osteomyelitis and receive IV antibiotic therapy.    Concurrent Medical History:  has a past medical history of Hypertension and Stroke.    Past Surgical History: Tonsillectomy, amputation of all toes on right foot, great toe amputation on left foot    Family History: Patient states that he is unsure of his family history.    Social History: Patient denies alcohol or illicit drug use.  Patient is a former smoker of at least a pack a day for 30+ years.  He reports quitting 5 months ago.    Allergies: No Known Allergies    Medications: Scheduled Meds:  docusate sodium 100 mg Oral BID   enoxaparin 40 mg Subcutaneous Daily   hydrochlorothiazide 25 mg Oral Daily   lisinopril 20 mg Oral Q24H   [START ON 4/18/2017] pantoprazole 40 mg Oral Q AM   piperacillin-tazobactam 3.375 g Intravenous Q6H     Continuous Infusions:  Pharmacy to dose vancomycin    sodium chloride 75 mL/hr     PRN Meds:.•  acetaminophen  •  Morphine  •  Morphine  •  ondansetron  •  Pharmacy to dose vancomycin  •  sodium chloride    Review of Systems:  Review of Systems   Constitutional: Negative for fever.   Respiratory: Negative for shortness of breath.     Cardiovascular: Negative for chest pain.   Gastrointestinal: Negative for nausea and vomiting.   Musculoskeletal: Negative for back pain and neck pain.   Skin: Positive for wound.        Wounds to left second toe, lateral aspect of left foot, and on bottom of patient's foot with sanguinous and purulent drainage noted to the patient's dressing.   Neurological: Negative for weakness.   Psychiatric/Behavioral: Negative for confusion.      Review of systems is difficult to obtain due to patient's aphasia related to CVA.    Physical Exam:   Temp:  [98 °F (36.7 °C)] 98 °F (36.7 °C)  Heart Rate:  [75] 75  Resp:  [18] 18  BP: (127)/(59) 127/59  Physical Exam   Constitutional: He is oriented to person, place, and time. He appears well-developed and well-nourished.   HENT:   Head: Normocephalic.   Eyes: Conjunctivae are normal.   Neck: Neck supple.   Cardiovascular: Normal rate and normal heart sounds.    Pulmonary/Chest: Effort normal and breath sounds normal. No respiratory distress. He has no wheezes. He has no rales. He exhibits no tenderness.   Abdominal: Soft. Bowel sounds are normal. He exhibits no distension. There is no tenderness.   Musculoskeletal: Normal range of motion.        Neurological: He is alert and oriented to person, place, and time.   Skin: Skin is warm.   Foot ulceration x3 with sanguinous and purulent drainage noted.    Psychiatric: He has a normal mood and affect. His behavior is normal.   Vitals reviewed.        Results Reviewed:  I have personally reviewed current lab, radiology, and data and agree with results.  Lab Results (last 24 hours)     ** No results found for the last 24 hours. **        Imaging Results (last 24 hours)     ** No results found for the last 24 hours. **            Assessment:    Hospital Problem List     Osteomyelitis of ankle or foot, acute                Plan:  #1 chronic foot ulcerations/possible osteomyelitis: Podiatry as been consult.  Wound and blood cultures have  been obtained.  MRI will be performed to rule out osteomyelitis.  Vancomycin per pharmacy dosing as well as Zosyn have been ordered for broad-spectrum coverage.  Wound Center consult was also been placed.  #2 hypertension: Continue patient's home doses of lisinopril and HCTZ.  #3 history of CVA    Protonix for GI prophylaxis and Lovenox for DVT prophylaxis.    I discussed the patients findings and my recommendations with the patient.  Further orders on this patient will depend upon the hospital course.  Approximately 45 minutes was spent on the admission process for this patient.  Code status was discussed with the patient and he wishes to be DNR code status.    BROOKLYNN Szymanski  04/17/17  5:25 PM                   Electronically signed by Shayan Morel MD at 4/18/2017  4:03 PM      Estuardo Car DPM at 4/18/2017  9:18 PM          Inpatient Consult to Podiatry  Consult performed by: ESTUARDO CAR  Consult ordered by: LOW BYRD  Reason for consult: Left foot infection        Patient Care Team:  BROOKLYNN Gu as PCP - General    Chief complaint: Left foot infection, pain.    History of Present Illness  Mr Wild is a 57 y/o male who is seen for evaluation of left foot infection. He is well known to me and underwent left partial 1st ray amputation in December of 2016. He healed well from that amputation, but subsequently developed 2 additional wounds to the left foot which we have been following weekly and utilizing home care for dressing changes. I sent the patient for admission yesterday following his presentation to clinic with worsening foot pain, edema and erythema of his forefoot. At exam today, pain is his primary complain. Denies N/V/F/C/SOB.    Review of Systems     Constitutional:  Denies recent weight loss, weight gain, fever or chills, no change in exercise tolerance  Musculoskeletal: Toe/foot pain.   Skin: Foot ulcer.  Neurological: Denies  paresthesias.  Psychiatric/Behavioral: Denies depression       Vital Signs  Temp:  [96.6 °F (35.9 °C)-97.6 °F (36.4 °C)] 97.4 °F (36.3 °C)  Heart Rate:  [49-65] 65  Resp:  [18] 18  BP: (126-150)/(64-86) 136/71    Physical Exam   Constitutional: he appears well-developed and well-nourished.   Psychiatric: he has a normal mood and affect. his  behavior is normal.     Lower Extremity Exam:  Vascular: DP/PT pulses palpable 1+.   Negative hair growth.   Minimal left foot edema  Feet cool to touch  Neuro: Protective sensation absent, b/l.  DTRs intact  Integument:   Atrophic skin noted b/l  Left partial 1st ray incision now healed  Left lateral 5th met base 3.0 x 2.0 x 0.3 with fibrogranular base. No fluctuance. No drainage, mild periwound erythema +pain on palpation  Plantar 2nd mpj ulceration on left. 2.6 x 1.6 x 0.3 cm with fibronecrotic base and surrounding HPK. No SOI. No PTB  Left 2nd digit edematous, fluctuant  Musculoskeletal: LE muscle strength 5/5.   Gait normal  Semi-rigid hammertoe deformity toes 2-5 on left  Right TMA    Results Review:   .  WBC   Date Value Ref Range Status   04/18/2017 6.52 3.20 - 9.80 10*3/mm3 Final     RBC   Date Value Ref Range Status   04/18/2017 4.26 (L) 4.37 - 5.74 10*6/mm3 Final     Hemoglobin   Date Value Ref Range Status   04/18/2017 13.9 13.7 - 17.3 g/dL Final     Hematocrit   Date Value Ref Range Status   04/18/2017 40.7 39.0 - 49.0 % Final     MCV   Date Value Ref Range Status   04/18/2017 95.5 80.0 - 98.0 fL Final     MCH   Date Value Ref Range Status   04/18/2017 32.6 26.5 - 34.0 pg Final     MCHC   Date Value Ref Range Status   04/18/2017 34.2 31.5 - 36.3 g/dL Final     RDW   Date Value Ref Range Status   04/18/2017 12.8 11.5 - 14.5 % Final     RDW-SD   Date Value Ref Range Status   04/18/2017 44.1 (H) 35.1 - 43.9 fl Final     MPV   Date Value Ref Range Status   04/18/2017 10.7 8.0 - 12.0 fL Final     Platelets   Date Value Ref Range Status   04/18/2017 135 (L) 150 - 450  10*3/mm3 Final     Neutrophil %   Date Value Ref Range Status   04/18/2017 44.0 37.0 - 80.0 % Final     Lymphocyte %   Date Value Ref Range Status   04/18/2017 32.5 10.0 - 50.0 % Final     Monocyte %   Date Value Ref Range Status   04/18/2017 17.9 (H) 0.0 - 12.0 % Final     Eosinophil %   Date Value Ref Range Status   04/18/2017 5.1 0.0 - 7.0 % Final     Basophil %   Date Value Ref Range Status   04/18/2017 0.3 0.0 - 2.0 % Final     Immature Grans %   Date Value Ref Range Status   04/18/2017 0.2 0.0 - 0.5 % Final     Neutrophils, Absolute   Date Value Ref Range Status   04/18/2017 2.87 2.00 - 8.60 10*3/mm3 Final     Lymphocytes, Absolute   Date Value Ref Range Status   04/18/2017 2.12 0.60 - 4.20 10*3/mm3 Final     Monocytes, Absolute   Date Value Ref Range Status   04/18/2017 1.17 (H) 0.00 - 0.90 10*3/mm3 Final     Eosinophils, Absolute   Date Value Ref Range Status   04/18/2017 0.33 0.00 - 0.70 10*3/mm3 Final     Basophils, Absolute   Date Value Ref Range Status   04/18/2017 0.02 0.00 - 0.20 10*3/mm3 Final     Immature Grans, Absolute   Date Value Ref Range Status   04/18/2017 0.01 0.00 - 0.02 10*3/mm3 Final       Assessment & Plan  1. Left foot abscess, cellulitis  2. Left foot full thickness ulceration  3. Polyneuropathy  4. Right foot TMA    Dressing changed. MRI images reviewed, pt seems to have local abscess at base of 2nd digit with questionable underlying osteomyelitis. Discussed need for I&D vs digital amputation. Also discussed TMA which will likely give patient best chance of healing long term. Patient agrees with this treatment plan. All risks, benefits and potential complications discussed including, but not limited to delayed healing, risk of infection, need for further amputation. Will attempt to add to surgical schedule tomorrow AM. NPO after midnight.      I discussed the patients findings and my recommendations with patient          This document has been electronically signed by Estuardo FARAH  NAVJOT Car on April 18, 2017 9:37 PM     Estuardo Car DPM  04/18/17  9:18 PM             Electronically signed by Estuardo Car DPM at 4/18/2017  9:37 PM      Estuardo Car DPM at 4/19/2017 10:53 AM            H&P reviewed. The patient was examined and there are no changes to the H&P. Evaluated in same day surgery. Proceed with left TMA, achilles lengthening, possible grafting of foot ulcer.          This document has been electronically signed by Estuardo Car DPM on April 19, 2017 10:53 AM            Electronically signed by Estuardo Car DPM at 4/19/2017 10:53 AM    Source Note             Inpatient Consult to Podiatry  Consult performed by: ESTUARDO CAR  Consult ordered by: LOW BYRD  Reason for consult: Left foot infection        Patient Care Team:  BROOKLYNN Gu as PCP - General    Chief complaint: Left foot infection, pain.    History of Present Illness  Mr Wild is a 55 y/o male who is seen for evaluation of left foot infection. He is well known to me and underwent left partial 1st ray amputation in December of 2016. He healed well from that amputation, but subsequently developed 2 additional wounds to the left foot which we have been following weekly and utilizing home care for dressing changes. I sent the patient for admission yesterday following his presentation to clinic with worsening foot pain, edema and erythema of his forefoot. At exam today, pain is his primary complain. Denies N/V/F/C/SOB.    Review of Systems     Constitutional:  Denies recent weight loss, weight gain, fever or chills, no change in exercise tolerance  Musculoskeletal: Toe/foot pain.   Skin: Foot ulcer.  Neurological: Denies paresthesias.  Psychiatric/Behavioral: Denies depression       Vital Signs  Temp:  [96.6 °F (35.9 °C)-97.6 °F (36.4 °C)] 97.4 °F (36.3 °C)  Heart Rate:  [49-65] 65  Resp:  [18] 18  BP: (126-150)/(64-86) 136/71    Physical Exam   Constitutional: he appears  well-developed and well-nourished.   Psychiatric: he has a normal mood and affect. his  behavior is normal.     Lower Extremity Exam:  Vascular: DP/PT pulses palpable 1+.   Negative hair growth.   Minimal left foot edema  Feet cool to touch  Neuro: Protective sensation absent, b/l.  DTRs intact  Integument:   Atrophic skin noted b/l  Left partial 1st ray incision now healed  Left lateral 5th met base 3.0 x 2.0 x 0.3 with fibrogranular base. No fluctuance. No drainage, mild periwound erythema +pain on palpation  Plantar 2nd mpj ulceration on left. 2.6 x 1.6 x 0.3 cm with fibronecrotic base and surrounding HPK. No SOI. No PTB  Left 2nd digit edematous, fluctuant  Musculoskeletal: LE muscle strength 5/5.   Gait normal  Semi-rigid hammertoe deformity toes 2-5 on left  Right TMA    Results Review:   .  WBC   Date Value Ref Range Status   04/18/2017 6.52 3.20 - 9.80 10*3/mm3 Final     RBC   Date Value Ref Range Status   04/18/2017 4.26 (L) 4.37 - 5.74 10*6/mm3 Final     Hemoglobin   Date Value Ref Range Status   04/18/2017 13.9 13.7 - 17.3 g/dL Final     Hematocrit   Date Value Ref Range Status   04/18/2017 40.7 39.0 - 49.0 % Final     MCV   Date Value Ref Range Status   04/18/2017 95.5 80.0 - 98.0 fL Final     MCH   Date Value Ref Range Status   04/18/2017 32.6 26.5 - 34.0 pg Final     MCHC   Date Value Ref Range Status   04/18/2017 34.2 31.5 - 36.3 g/dL Final     RDW   Date Value Ref Range Status   04/18/2017 12.8 11.5 - 14.5 % Final     RDW-SD   Date Value Ref Range Status   04/18/2017 44.1 (H) 35.1 - 43.9 fl Final     MPV   Date Value Ref Range Status   04/18/2017 10.7 8.0 - 12.0 fL Final     Platelets   Date Value Ref Range Status   04/18/2017 135 (L) 150 - 450 10*3/mm3 Final     Neutrophil %   Date Value Ref Range Status   04/18/2017 44.0 37.0 - 80.0 % Final     Lymphocyte %   Date Value Ref Range Status   04/18/2017 32.5 10.0 - 50.0 % Final     Monocyte %   Date Value Ref Range Status   04/18/2017 17.9 (H) 0.0 -  12.0 % Final     Eosinophil %   Date Value Ref Range Status   04/18/2017 5.1 0.0 - 7.0 % Final     Basophil %   Date Value Ref Range Status   04/18/2017 0.3 0.0 - 2.0 % Final     Immature Grans %   Date Value Ref Range Status   04/18/2017 0.2 0.0 - 0.5 % Final     Neutrophils, Absolute   Date Value Ref Range Status   04/18/2017 2.87 2.00 - 8.60 10*3/mm3 Final     Lymphocytes, Absolute   Date Value Ref Range Status   04/18/2017 2.12 0.60 - 4.20 10*3/mm3 Final     Monocytes, Absolute   Date Value Ref Range Status   04/18/2017 1.17 (H) 0.00 - 0.90 10*3/mm3 Final     Eosinophils, Absolute   Date Value Ref Range Status   04/18/2017 0.33 0.00 - 0.70 10*3/mm3 Final     Basophils, Absolute   Date Value Ref Range Status   04/18/2017 0.02 0.00 - 0.20 10*3/mm3 Final     Immature Grans, Absolute   Date Value Ref Range Status   04/18/2017 0.01 0.00 - 0.02 10*3/mm3 Final       Assessment & Plan  1. Left foot abscess, cellulitis  2. Left foot full thickness ulceration  3. Polyneuropathy  4. Right foot TMA    Dressing changed. MRI images reviewed, pt seems to have local abscess at base of 2nd digit with questionable underlying osteomyelitis. Discussed need for I&D vs digital amputation. Also discussed TMA which will likely give patient best chance of healing long term. Patient agrees with this treatment plan. All risks, benefits and potential complications discussed including, but not limited to delayed healing, risk of infection, need for further amputation. Will attempt to add to surgical schedule tomorrow AM. NPO after midnight.      I discussed the patients findings and my recommendations with patient          This document has been electronically signed by Estuardo Fried DPM on April 18, 2017 9:37 PM     Estuardo Fried DPM  04/18/17  9:18 PM              Electronically signed by Estuardo Fried DPM at 4/18/2017  9:37 PM              Vital Signs (last 72 hrs)       04/18 0700  -  04/19 0659 04/19 0700  -  04/20 0659  04/20 0700  -  04/21 0659 04/21 0700  -  04/21 1640   Most Recent    Temp (°F) 97 -  97.8    96.2 -  98.2    96.4 -  99      97     97 (36.1)    Heart Rate (!)49 -  65    54 -  79    58 -  74    (!)49 -  51     (!) 49    Resp   18    16 -  20    16 -  20      18     18    /64 -  166/64    90/63 -  160/92    129/67 -  177/84      152/70     152/70    SpO2 (%) 91 -  95    92 -  97    92 -  95      95     95          Lines, Drains & Airways    Active LDAs     Name:   Placement date:   Placement time:   Site:   Days:    PICC Line - Triple Lumen 04/20/17 1118  04/20/17    1118      1                Hospital Medications (active)       Dose Frequency Start End    bupivacaine (PF) (MARCAINE) 0.5 % injection  As Needed 4/19/2017     Sig: As Needed.    docusate sodium (COLACE) capsule 100 mg 100 mg 2 Times Daily 4/17/2017     Sig - Route: Take 1 capsule by mouth 2 (Two) Times a Day. - Oral    enoxaparin (LOVENOX) syringe 40 mg 40 mg Daily 4/17/2017     Sig - Route: Inject 0.4 mL under the skin Daily. - Subcutaneous    hydrochlorothiazide (HYDRODIURIL) tablet 25 mg 25 mg Daily 4/17/2017     Sig - Route: Take 1 tablet by mouth Daily. - Oral    HYDROcodone-acetaminophen (NORCO) 7.5-325 MG per tablet 1 tablet 1 tablet Every 6 Hours PRN 4/19/2017 4/29/2017    Sig - Route: Take 1 tablet by mouth Every 6 (Six) Hours As Needed for Moderate Pain (4-6). - Oral    lisinopril (PRINIVIL,ZESTRIL) tablet 20 mg 20 mg Every 24 Hours Scheduled 4/17/2017     Sig - Route: Take 1 tablet by mouth Daily. - Oral    Morphine sulfate (PF) injection 1 mg 1 mg Every 4 Hours PRN 4/17/2017 4/27/2017    Sig - Route: Infuse 0.5 mL into a venous catheter Every 4 (Four) Hours As Needed for Mild Pain (1-3) or Moderate Pain (4-6). - Intravenous    Morphine sulfate (PF) injection 2 mg 2 mg Every 4 Hours PRN 4/17/2017 4/27/2017    Sig - Route: Infuse 1 mL into a venous catheter Every 4 (Four) Hours As Needed for Severe Pain (7-10). - Intravenous     ondansetron (ZOFRAN) tablet 4 mg 4 mg Every 6 Hours PRN 4/17/2017     Sig - Route: Take 1 tablet by mouth Every 6 (Six) Hours As Needed for Nausea or Vomiting. - Oral    pantoprazole (PROTONIX) EC tablet 40 mg 40 mg Every Early Morning 4/18/2017     Sig - Route: Take 1 tablet by mouth Every Morning. - Oral    Pharmacy to dose vancomycin  Continuous PRN 4/17/2017     Sig - Route: Continuous As Needed for Consult. - Does not apply    piperacillin-tazobactam (ZOSYN) 3.375 g in iso-osmotic dextrose 50 ml (premix) 3.375 g Every 6 Hours 4/17/2017     Sig - Route: Infuse 50 mL into a venous catheter Every 6 (Six) Hours. - Intravenous    sodium chloride 0.9 % flush 1-10 mL 1-10 mL As Needed 4/17/2017     Sig - Route: Infuse 1-10 mL into a venous catheter As Needed for Line Care. - Intravenous    sodium chloride 0.9 % flush 10 mL 10 mL Every 12 Hours Scheduled 4/20/2017     Sig - Route: Infuse 10 mL into a venous catheter Every 12 (Twelve) Hours. - Intravenous    vancomycin (VANCOCIN) 1,750 mg in sodium chloride 0.9 % 500 mL IVPB 1,750 mg Every 12 Hours 4/20/2017     Sig - Route: Infuse 1,750 mg into a venous catheter Every 12 (Twelve) Hours. - Intravenous          Lab Results (last 72 hours)     Procedure Component Value Units Date/Time    Blood Culture ID, PCR [[id number]]  (Abnormal) Collected:  04/17/17 1741    Specimen:  Blood from Arm, Left Updated:  04/18/17 1802     BCID, PCR Staphylococcus spp, not aureus. Identification by BCID PCR. (C)    Narrative:         Sensitivity to Follow    Basic Metabolic Panel [[id number]]  (Abnormal) Collected:  04/19/17 0607    Specimen:  Blood Updated:  04/19/17 0658     Glucose 91 mg/dL      BUN 14 mg/dL      Creatinine 0.89 mg/dL      Sodium 142 mmol/L      Potassium 4.5 mmol/L      Chloride 101 mmol/L      CO2 29.0 mmol/L      Calcium 8.1 (L) mg/dL      eGFR Non African Amer 88 mL/min/1.73      BUN/Creatinine Ratio 15.7     Anion Gap 12.0 mmol/L     CBC & Differential [[id number]]  Collected:  04/19/17 0607    Specimen:  Blood Updated:  04/19/17 0701    Narrative:       The following orders were created for panel order CBC & Differential.  Procedure                               Abnormality         Status                     ---------                               -----------         ------                     CBC Auto Differential[[id number]]         Abnormal            Final result                 Please view results for these tests on the individual orders.    CBC Auto Differential [[id number]]  (Abnormal) Collected:  04/19/17 0607    Specimen:  Blood Updated:  04/19/17 0701     WBC 5.83 10*3/mm3      RBC 4.33 (L) 10*6/mm3      Hemoglobin 14.2 g/dL      Hematocrit 42.0 %      MCV 97.0 fL      MCH 32.8 pg      MCHC 33.8 g/dL      RDW 13.1 %      RDW-SD 46.4 (H) fl      MPV 10.7 fL      Platelets 129 (L) 10*3/mm3      Neutrophil % 49.2 %      Lymphocyte % 30.9 %      Monocyte % 11.8 %      Eosinophil % 7.4 (H) %      Basophil % 0.5 %      Immature Grans % 0.2 %      Neutrophils, Absolute 2.87 10*3/mm3      Lymphocytes, Absolute 1.80 10*3/mm3      Monocytes, Absolute 0.69 10*3/mm3      Eosinophils, Absolute 0.43 10*3/mm3      Basophils, Absolute 0.03 10*3/mm3      Immature Grans, Absolute 0.01 10*3/mm3      nRBC 0.0 /100 WBC     Blood Culture [[id number]]  (Abnormal) Collected:  04/17/17 1741    Specimen:  Blood from Arm, Left Updated:  04/19/17 0719     Blood Culture Abnormal Stain (A)      Staphylococcus, coagulase negative (A)      Consistent with contamination at time of collection.          Gram Stain Result Aerobic Bottle Gram positive cocci in clusters    Wound Culture [[id number]]  (Abnormal)  (Susceptibility) Collected:  04/17/17 1753    Specimen:  Wound from Foot, Left Updated:  04/19/17 1030     Wound Culture Light growth (2+) Escherichia coli (A)     Beta Lactamase --     Gram Stain Result Rare (1+) WBCs seen      Rare (1+) Gram positive cocci in pairs    Narrative:       Slide reviewed  confirmed    Susceptibility      Escherichia coli     JAUN     Amikacin <=16 ug/ml Susceptible     Ampicillin >16 ug/ml Resistant     Ampicillin + Sulbactam <=8/4 ug/ml Susceptible     Cefazolin <=8 ug/ml Susceptible     Cefoxitin <=8 ug/ml Susceptible     Ceftazidime <=1 ug/ml Susceptible     Ceftriaxone <=8 ug/ml Susceptible     Ciprofloxacin <=1 ug/ml Susceptible     Gentamicin <=4 ug/ml Susceptible     Levofloxacin <=2 ug/ml Susceptible     Piperacillin + Tazobactam <=16 ug/ml Susceptible     Tobramycin <=4 ug/ml Susceptible     Trimethoprim + Sulfamethoxazole <=2/38 ug/ml Susceptible                    Vancomycin, Trough [[id number]]  (Abnormal) Collected:  04/19/17 1317    Specimen:  Blood Updated:  04/19/17 1429     Vancomycin Trough 19.88 (H) mcg/mL     CBC & Differential [[id number]] Collected:  04/20/17 0633    Specimen:  Blood Updated:  04/20/17 0742    Narrative:       The following orders were created for panel order CBC & Differential.  Procedure                               Abnormality         Status                     ---------                               -----------         ------                     CBC Auto Differential[[id number]]         Abnormal            Final result                 Please view results for these tests on the individual orders.    CBC Auto Differential [[id number]]  (Abnormal) Collected:  04/20/17 0633    Specimen:  Blood Updated:  04/20/17 0742     WBC 6.22 10*3/mm3      RBC 3.77 (L) 10*6/mm3      Hemoglobin 12.2 (L) g/dL      Hematocrit 35.9 (L) %      MCV 95.2 fL      MCH 32.4 pg      MCHC 34.0 g/dL      RDW 12.7 %      RDW-SD 43.9 fl      MPV 10.7 fL      Platelets 122 (L) 10*3/mm3      Neutrophil % 56.5 %      Lymphocyte % 28.3 %      Monocyte % 10.5 %      Eosinophil % 4.2 %      Basophil % 0.3 %      Immature Grans % 0.2 %      Neutrophils, Absolute 3.52 10*3/mm3      Lymphocytes, Absolute 1.76 10*3/mm3      Monocytes, Absolute 0.65 10*3/mm3      Eosinophils, Absolute 0.26  10*3/mm3      Basophils, Absolute 0.02 10*3/mm3      Immature Grans, Absolute 0.01 10*3/mm3     Basic Metabolic Panel [[id number]]  (Abnormal) Collected:  04/20/17 0633    Specimen:  Blood Updated:  04/20/17 0752     Glucose 90 mg/dL      BUN 11 mg/dL      Creatinine 0.75 mg/dL      Sodium 137 mmol/L      Potassium 4.3 mmol/L      Chloride 98 mmol/L      CO2 29.0 mmol/L      Calcium 7.8 (L) mg/dL      eGFR Non African Amer 107 mL/min/1.73      BUN/Creatinine Ratio 14.7     Anion Gap 10.0 mmol/L     Blood Culture [[id number]]  (Abnormal) Collected:  04/17/17 1731    Specimen:  Blood from Arm, Right Updated:  04/21/17 0652     Blood Culture Staphylococcus, coagulase negative (A)     Isolated from Anaerobic Bottle     Gram Stain Result Anaerobic Bottle Gram positive cocci in clusters      two of four; 04/19/2017 0541 alp       CBC & Differential [[id number]] Collected:  04/21/17 0543    Specimen:  Blood Updated:  04/21/17 0700    Narrative:       The following orders were created for panel order CBC & Differential.  Procedure                               Abnormality         Status                     ---------                               -----------         ------                     CBC Auto Differential[[id number]]         Abnormal            Final result                 Please view results for these tests on the individual orders.    CBC Auto Differential [[id number]]  (Abnormal) Collected:  04/21/17 0543    Specimen:  Blood Updated:  04/21/17 0700     WBC 7.04 10*3/mm3      RBC 3.74 (L) 10*6/mm3      Hemoglobin 12.3 (L) g/dL      Hematocrit 35.2 (L) %      MCV 94.1 fL      MCH 32.9 pg      MCHC 34.9 g/dL      RDW 12.4 %      RDW-SD 42.4 fl      MPV 10.8 fL      Platelets 123 (L) 10*3/mm3      Neutrophil % 50.5 %      Lymphocyte % 31.8 %      Monocyte % 10.1 %      Eosinophil % 7.0 %      Basophil % 0.3 %      Immature Grans % 0.3 %      Neutrophils, Absolute 3.56 10*3/mm3      Lymphocytes, Absolute 2.24 10*3/mm3       Monocytes, Absolute 0.71 10*3/mm3      Eosinophils, Absolute 0.49 10*3/mm3      Basophils, Absolute 0.02 10*3/mm3      Immature Grans, Absolute 0.02 10*3/mm3     Basic Metabolic Panel [[id number]]  (Abnormal) Collected:  04/21/17 0543    Specimen:  Blood Updated:  04/21/17 0713     Glucose 87 mg/dL      BUN 9 mg/dL      Creatinine 0.74 mg/dL      Sodium 138 mmol/L      Potassium 3.8 mmol/L      Chloride 100 mmol/L      CO2 30.0 mmol/L      Calcium 8.0 (L) mg/dL      eGFR Non African Amer 109 mL/min/1.73      BUN/Creatinine Ratio 12.2     Anion Gap 8.0 mmol/L     Tissue Exam [[id number]] Collected:  04/19/17 1215    Specimen:  Tissue from Foot, Left Updated:  04/21/17 1600     Case Report --     Surgical Pathology Report                         Case: [id number]                                  Authorizing Provider:  Estuardo Fried DPM     Collected:           04/19/2017 12:15 PM          Ordering Location:     [facility]             Received:            04/20/2017 10:20 AM                                 [place]                                                              Pathologist:           Heraclio Wade MD                                                         Specimen:    Foot, Left, left forefoot                                                                   Clinical Information --     left forefoot       Final Diagnosis --     DISTALLY AMPUTATED LEFT FOOT:   STATUS POST AMPUTATION OF BIG TOE.   DEEP ULCER, PLANTAR ASPECT OF 2ND TOE WITH UNDERLYING ACUTE OSTEOMYELITIS.        Gross Description --     The specimen consists of a distally amputated left forefoot weighing 126 grams and measuring 9.0 x 7.0 x 4.0 cm.  The big toe is surgically absent and the plantar aspect of the 2nd toe is dark brown and crusted.  A large deep ulcer is situated on the plantar aspect of the 2nd toe approximately measuring 3.0 cm in diameter.  The resection margins are not remarkable.  The underlying bone and soft  "tissues are of the usual appearance.  Also received in the same container are a few fibrofatty tissues measuring from 2.0-3.0 cm in greatest dimension.  Representative sections are embedded.          Embedded Images --             Operative/Procedure Notes (most recent note)      Estuardo Fried DPM at 4/19/2017  1:08 PM  Version 1 of 1         AMPUTATION DIGIT  Procedure Note    Damian Olguin Torri  4/17/2017 - 4/19/2017    Pre-op Diagnosis:   Osteomyelitis of ankle or foot, acute, left [M86.172]    Post-op Diagnosis:     Post-Op Diagnosis Codes:     * Osteomyelitis of ankle or foot, acute, left [M86.172]     * Foot ulcer, left [L97.529]     * Equinus contracture of ankle [M24.573]    Procedure/CPT® Codes:      Procedure(s):  LEFT FOOT TRANSMETATARSAL AMPUTATION WITH EXCISION OF WOUND AND ROTATIONAL CLOSURE, TENDO ACHILLES LENGTHENING, FULL THICKNESS SKIN GRAFTING.    Surgeon(s):  Estuardo Fried DPM    Anesthesia: General    Staff:   Circulator: Alie Estevez RN  Scrub Person: Pierre Parker  Assistant: Lizbeth Reed MA    Estimated Blood Loss: 50 mL  Urine Voided: * No values recorded between 4/19/2017 11:11 AM and 4/19/2017  1:04 PM *    Specimens:                  ID Type Source Tests Collected by Time Destination   A : left forefoot Tissue Foot, Left TISSUE EXAM Estuardo Fried DPM 4/19/2017 1215          Drains:    1/2\" packing       Findings: Consistent with diagnosis.    Complications: None          This document has been electronically signed by Estuardo Fried DPM on April 19, 2017 1:10 PM     Estuardo Fried DPM     Date: 4/19/2017  Time: 1:08 PM         Electronically signed by Estuardo Fried DPM at 4/19/2017  1:10 PM           Physician Progress Notes (most recent note)      Shayan Morel MD at 4/20/2017 11:44 AM  Version 1 of 1                [facility] Medicine Services  INPATIENT PROGRESS NOTE     LOS: 3 days   Patient Care " Team:  BROOKLYNN Gu as PCP - General    Chief Complaint: Patient is seen for follow-up today.  He had a transmetatarsal amputation of the left foot yesterday due to osteomyelitis.  He is able to communicate despite altered speech from a previous stroke.  No new complaints today.       Subjective     Interval History:     Patient Complaints: No new complaints except periodic pain at the surgical site.    History taken from: Patient    Review of Systems:    Review of Systems   Constitutional: Negative for chills, diaphoresis, fatigue and fever.   Respiratory: Negative for cough, chest tightness, shortness of breath and wheezing.    Cardiovascular: Negative for chest pain, palpitations and leg swelling.   Gastrointestinal: Negative for abdominal pain, constipation, diarrhea, nausea and vomiting.   Genitourinary: Negative for dysuria, flank pain, frequency, hematuria and urgency.   Neurological: Positive for speech difficulty. Negative for tremors, weakness, light-headedness and headaches.   Psychiatric/Behavioral: Negative for agitation and confusion.         Objective     Vital Signs  Temp:  [96.2 °F (35.7 °C)-98.2 °F (36.8 °C)] 96.7 °F (35.9 °C)  Heart Rate:  [62-79] 63  Resp:  [16-18] 16  BP: ()/(56-92) 144/72    Physical Exam:   Physical Exam   Constitutional: He is oriented to person, place, and time. He appears well-developed and well-nourished.   Cardiovascular: Normal rate and regular rhythm.  Exam reveals no gallop.    No murmur heard.  Pulmonary/Chest: Effort normal and breath sounds normal. He has no wheezes. He has no rales.   Abdominal: Soft. Bowel sounds are normal. He exhibits no distension. There is no tenderness.   Musculoskeletal: He exhibits no edema.   Neurological: He is alert and oriented to person, place, and time.   Skin: Skin is warm and dry.   Nursing note and vitals reviewed.  Left foot: Covered with dressing.       Results Review:       Results from last 7 days  Lab Units  04/20/17  0633 04/19/17  0607 04/18/17  0529 04/17/17  1741   SODIUM mmol/L 137 142 138 131*   POTASSIUM mmol/L 4.3 4.5 4.7 4.6   CHLORIDE mmol/L 98 101 99 94*   TOTAL CO2 mmol/L 29.0 29.0 30.0 26.0   BUN mg/dL 11 14 15 16   CREATININE mg/dL 0.75 0.89 0.86 0.83   GLUCOSE mg/dL 90 91 79 73   CALCIUM mg/dL 7.8* 8.1* 8.5 8.9               Results from last 7 days  Lab Units 04/20/17  0633 04/19/17  0607 04/18/17  0529 04/17/17  1741   WBC 10*3/mm3 6.22 5.83 6.52 8.60   HEMOGLOBIN g/dL 12.2* 14.2 13.9 14.0   HEMATOCRIT % 35.9* 42.0 40.7 39.4   PLATELETS 10*3/mm3 122* 129* 135* 151       No results found for: CKTOTAL, CKMB, CKMBINDEX, TROPONINI, TROPONINT    CO2   Date Value Ref Range Status   04/20/2017 29.0 22.0 - 31.0 mmol/L Final              Imaging Results (last 7 days)     Procedure Component Value Units Date/Time    MRI Foot Left With & Without Contrast [[id number]] Collected:  04/18/17 1740     Updated:  04/18/17 1748    Narrative:       Patient Name:  BRIANA ROTHMAN  Patient ID:  [id number]   Ordering:  JOLENE CAR  Attending:  LEXI MANNING  Referring:  JOLENE CAR  ------------------------------------------------    MRI left foot with and without contrast.    HISTORY: Evaluate for osteomyelitis.  Prior examination left foot March 28, 2017.    TECHNIQUE: Multiplanar multisequence images left foot obtained  both prior to and following the intervenous infusion of  gadolinium, 20 mL of ProHance. There is been amputation of the  great toe and distal aspect of the first metatarsal. There is a  small fluid collection adjacent to the stump of the distal first  metatarsal.    The remaining portions of the first metatarsal are unremarkable  with normal marrow signal intensity.    There is bone edema and findings suggesting cortical destruction  of the distal aspect of the second metatarsal. These findings are  suspicious for bony infection, osteomyelitis.      Impression:       CONCLUSION: Amputation  distal first metatarsal and great toe.  Small fluid collection adjacent to the distal aspect of first  metatarsal most likely development of a small postsurgical bursa  at the stump.    Abnormal distal aspect of second metatarsal with bone edema,  subtle enhancement and some questionable cortical destruction.  These findings are suspicious for bony infection, osteomyelitis.    Electronically signed by:  Yonny Montoya MD  4/18/2017 5:47 PM CDT  Workstation: [id number]    IR PICC wo fluoro guidance [[id number]] Resulted:  04/20/17 1139     Updated:  04/20/17 1139    Narrative:       This procedure was auto-finalized with no dictation required.           Blood Culture   Date Value Ref Range Status   04/17/2017 Abnormal Stain (A)  Final   04/17/2017 Staphylococcus, coagulase negative (A)  Final     Comment:     Consistent with contamination at time of collection.       BCID, PCR   Date Value Ref Range Status   04/17/2017 (C) Negative by BCID PCR. Culture to Follow., No organism detected by BCID PCR. Final    Staphylococcus spp, not aureus. Identification by BCID PCR.                    Wound Culture   Date Value Ref Range Status   04/17/2017 Light growth (2+) Escherichia coli (A)  Final        Medication Review:   Current Facility-Administered Medications   Medication Dose Route Frequency Provider Last Rate Last Dose   • bupivacaine (PF) (MARCAINE) 0.5 % injection    PRN Estuardo Fried DPM   28 mL at 04/19/17 1146   • docusate sodium (COLACE) capsule 100 mg  100 mg Oral BID Khushbu Santos APRN   100 mg at 04/20/17 0805   • enoxaparin (LOVENOX) syringe 40 mg  40 mg Subcutaneous Daily Khushbu Santos APRN   40 mg at 04/17/17 2011   • hydrochlorothiazide (HYDRODIURIL) tablet 25 mg  25 mg Oral Daily Khushbu Santos APRN   25 mg at 04/20/17 0805   • HYDROcodone-acetaminophen (NORCO) 7.5-325 MG per tablet 1 tablet  1 tablet Oral Q6H PRN Estuardo Fried DPM   1 tablet at 04/20/17 0805   • lisinopril (PRINIVIL,ZESTRIL)  tablet 20 mg  20 mg Oral Q24H Khushbu Santos, APRN   20 mg at 04/20/17 0805   • Morphine sulfate (PF) injection 1 mg  1 mg Intravenous Q4H PRN Alec Falcon MD   1 mg at 04/18/17 1930   • Morphine sulfate (PF) injection 2 mg  2 mg Intravenous Q4H PRN Alec Falcon MD   2 mg at 04/20/17 0926   • ondansetron (ZOFRAN) tablet 4 mg  4 mg Oral Q6H PRN Khushbu Santos, APRN       • pantoprazole (PROTONIX) EC tablet 40 mg  40 mg Oral Q AM Khushbu Santos, APRN   40 mg at 04/20/17 0624   • Pharmacy to dose vancomycin   Does not apply Continuous PRN Khushbu Santos, APRN       • piperacillin-tazobactam (ZOSYN) 3.375 g in iso-osmotic dextrose 50 ml (premix)  3.375 g Intravenous Q6H Khushbu Santos, APRN 0 mL/hr at 04/18/17 1745 3.375 g at 04/20/17 0624   • sodium chloride 0.9 % flush 1-10 mL  1-10 mL Intravenous PRN Khushbu Santos, APRN   10 mL at 04/20/17 0326   • vancomycin (VANCOCIN) 1,750 mg in sodium chloride 0.9 % 500 mL IVPB  1,750 mg Intravenous Q12H Nakul Sun MD             Assessment/Plan     1.  Osteomyelitis left foot: Status post trans-metatarsophalangeal amputation.  Continue antibiotics.  Patient will require at least 2 weeks of IV antibiotics.  PICC line will be ordered for placement today.  2.  History of CVA: Stable.  3.  History of hypertension: Stable.  4.  Disposition: 1-2 days.    Shayan Morel MD  04/20/17      EMR Dragon/Transcription disclaimer:   Much of this encounter note is an electronic transcription/translation of spoken language to printed text. The electronic translation of spoken language may permit erroneous, or at times, nonsensical words or phrases to be inadvertently transcribed; Although I have reviewed the note for such errors, some may still exist.                 Electronically signed by Shayan Morel MD at 4/20/2017 11:54 AM           Consult Notes (most recent note)      Estuardo Fried DPM at 4/18/2017  9:18 PM  Version 1 of 1     Consult Orders:    1.  Inpatient Consult to Podiatry [[id number]] ordered by BROOKLYNN Szymanski at 04/17/17 1724                Inpatient Consult to Podiatry  Consult performed by: JOLENE CAR  Consult ordered by: LOW BYRD  Reason for consult: Left foot infection        Patient Care Team:  BROOKLYNN Gu as PCP - General    Chief complaint: Left foot infection, pain.    History of Present Illness  Mr Wild is a 57 y/o male who is seen for evaluation of left foot infection. He is well known to me and underwent left partial 1st ray amputation in December of 2016. He healed well from that amputation, but subsequently developed 2 additional wounds to the left foot which we have been following weekly and utilizing home care for dressing changes. I sent the patient for admission yesterday following his presentation to clinic with worsening foot pain, edema and erythema of his forefoot. At exam today, pain is his primary complain. Denies N/V/F/C/SOB.    Review of Systems     Constitutional:  Denies recent weight loss, weight gain, fever or chills, no change in exercise tolerance  Musculoskeletal: Toe/foot pain.   Skin: Foot ulcer.  Neurological: Denies paresthesias.  Psychiatric/Behavioral: Denies depression       Vital Signs  Temp:  [96.6 °F (35.9 °C)-97.6 °F (36.4 °C)] 97.4 °F (36.3 °C)  Heart Rate:  [49-65] 65  Resp:  [18] 18  BP: (126-150)/(64-86) 136/71    Physical Exam   Constitutional: he appears well-developed and well-nourished.   Psychiatric: he has a normal mood and affect. his  behavior is normal.     Lower Extremity Exam:  Vascular: DP/PT pulses palpable 1+.   Negative hair growth.   Minimal left foot edema  Feet cool to touch  Neuro: Protective sensation absent, b/l.  DTRs intact  Integument:   Atrophic skin noted b/l  Left partial 1st ray incision now healed  Left lateral 5th met base 3.0 x 2.0 x 0.3 with fibrogranular base. No fluctuance. No drainage, mild periwound erythema +pain on palpation  Plantar 2nd mpj  ulceration on left. 2.6 x 1.6 x 0.3 cm with fibronecrotic base and surrounding HPK. No SOI. No PTB  Left 2nd digit edematous, fluctuant  Musculoskeletal: LE muscle strength 5/5.   Gait normal  Semi-rigid hammertoe deformity toes 2-5 on left  Right TMA    Results Review:   .  WBC   Date Value Ref Range Status   04/18/2017 6.52 3.20 - 9.80 10*3/mm3 Final     RBC   Date Value Ref Range Status   04/18/2017 4.26 (L) 4.37 - 5.74 10*6/mm3 Final     Hemoglobin   Date Value Ref Range Status   04/18/2017 13.9 13.7 - 17.3 g/dL Final     Hematocrit   Date Value Ref Range Status   04/18/2017 40.7 39.0 - 49.0 % Final     MCV   Date Value Ref Range Status   04/18/2017 95.5 80.0 - 98.0 fL Final     MCH   Date Value Ref Range Status   04/18/2017 32.6 26.5 - 34.0 pg Final     MCHC   Date Value Ref Range Status   04/18/2017 34.2 31.5 - 36.3 g/dL Final     RDW   Date Value Ref Range Status   04/18/2017 12.8 11.5 - 14.5 % Final     RDW-SD   Date Value Ref Range Status   04/18/2017 44.1 (H) 35.1 - 43.9 fl Final     MPV   Date Value Ref Range Status   04/18/2017 10.7 8.0 - 12.0 fL Final     Platelets   Date Value Ref Range Status   04/18/2017 135 (L) 150 - 450 10*3/mm3 Final     Neutrophil %   Date Value Ref Range Status   04/18/2017 44.0 37.0 - 80.0 % Final     Lymphocyte %   Date Value Ref Range Status   04/18/2017 32.5 10.0 - 50.0 % Final     Monocyte %   Date Value Ref Range Status   04/18/2017 17.9 (H) 0.0 - 12.0 % Final     Eosinophil %   Date Value Ref Range Status   04/18/2017 5.1 0.0 - 7.0 % Final     Basophil %   Date Value Ref Range Status   04/18/2017 0.3 0.0 - 2.0 % Final     Immature Grans %   Date Value Ref Range Status   04/18/2017 0.2 0.0 - 0.5 % Final     Neutrophils, Absolute   Date Value Ref Range Status   04/18/2017 2.87 2.00 - 8.60 10*3/mm3 Final     Lymphocytes, Absolute   Date Value Ref Range Status   04/18/2017 2.12 0.60 - 4.20 10*3/mm3 Final     Monocytes, Absolute   Date Value Ref Range Status   04/18/2017 1.17  (H) 0.00 - 0.90 10*3/mm3 Final     Eosinophils, Absolute   Date Value Ref Range Status   04/18/2017 0.33 0.00 - 0.70 10*3/mm3 Final     Basophils, Absolute   Date Value Ref Range Status   04/18/2017 0.02 0.00 - 0.20 10*3/mm3 Final     Immature Grans, Absolute   Date Value Ref Range Status   04/18/2017 0.01 0.00 - 0.02 10*3/mm3 Final       Assessment & Plan  1. Left foot abscess, cellulitis  2. Left foot full thickness ulceration  3. Polyneuropathy  4. Right foot TMA    Dressing changed. MRI images reviewed, pt seems to have local abscess at base of 2nd digit with questionable underlying osteomyelitis. Discussed need for I&D vs digital amputation. Also discussed TMA which will likely give patient best chance of healing long term. Patient agrees with this treatment plan. All risks, benefits and potential complications discussed including, but not limited to delayed healing, risk of infection, need for further amputation. Will attempt to add to surgical schedule tomorrow AM. NPO after midnight.      I discussed the patients findings and my recommendations with patient          This document has been electronically signed by Estuardo Fried DPM on April 18, 2017 9:37 PM     Estuardo Fried DPM  04/18/17  9:18 PM             Electronically signed by Estuardo Fried DPM at 4/18/2017  9:37 PM

## 2017-04-21 NOTE — PROGRESS NOTES
"Pharmacokinetics by Pharmacy - Vancomycin    Damian Wild is a 56 y.o. male  [Ht: 70\" (177.8 cm); Wt: 213 lb 3.2 oz (96.7 kg)]    Estimated Creatinine Clearance: 127 mL/min (by C-G formula based on Cr of 0.74).   Lab Results   Component Value Date    CREATININE 0.74 04/21/2017    CREATININE 0.75 04/20/2017    CREATININE 0.89 04/19/2017      Lab Results   Component Value Date    WBC 7.04 04/21/2017    WBC 6.22 04/20/2017    WBC 5.83 04/19/2017      Temp Readings from Last 1 Encounters:   04/21/17 97 °F (36.1 °C) (Oral)      Lab Results   Component Value Date    VANCOTROUGH 19.88 (H) 04/19/2017         Culture Results:  Microbiology Results (last 10 days)       Procedure Component Value - Date/Time      Wound Culture [[id number]]  (Abnormal)  (Susceptibility) Collected:  04/17/17 1753    Lab Status:  Final result Specimen:  Wound from Foot, Left Updated:  04/19/17 1030     Wound Culture Light growth (2+) Escherichia coli (A)     Beta Lactamase --     Gram Stain Result Rare (1+) WBCs seen      Rare (1+) Gram positive cocci in pairs    Narrative:       Slide reviewed confirmed    Susceptibility        Escherichia coli     JAUN     Amikacin <=16 ug/ml Susceptible     Ampicillin >16 ug/ml Resistant     Ampicillin + Sulbactam <=8/4 ug/ml Susceptible     Cefazolin <=8 ug/ml Susceptible     Cefoxitin <=8 ug/ml Susceptible     Ceftazidime <=1 ug/ml Susceptible     Ceftriaxone <=8 ug/ml Susceptible     Ciprofloxacin <=1 ug/ml Susceptible     Gentamicin <=4 ug/ml Susceptible     Levofloxacin <=2 ug/ml Susceptible     Piperacillin + Tazobactam <=16 ug/ml Susceptible     Tobramycin <=4 ug/ml Susceptible     Trimethoprim + Sulfamethoxazole <=2/38 ug/ml Susceptible                      Blood Culture [[id number]]  (Abnormal) Collected:  04/17/17 1741    Lab Status:  Final result Specimen:  Blood from Arm, Left Updated:  04/19/17 0719     Blood Culture Abnormal Stain (A)      Staphylococcus, coagulase negative (A)      Consistent " with contamination at time of collection.          Gram Stain Result Aerobic Bottle Gram positive cocci in clusters    Blood Culture ID, PCR [[id number]]  (Abnormal) Collected:  04/17/17 1741    Lab Status:  Final result Specimen:  Blood from Arm, Left Updated:  04/18/17 1802     BCID, PCR Staphylococcus spp, not aureus. Identification by BCID PCR. (C)    Narrative:         Sensitivity to Follow    Blood Culture [[id number]]  (Abnormal) Collected:  04/17/17 1731    Lab Status:  Preliminary result Specimen:  Blood from Arm, Right Updated:  04/21/17 0652     Blood Culture Staphylococcus, coagulase negative (A)     Isolated from Anaerobic Bottle     Gram Stain Result Anaerobic Bottle Gram positive cocci in clusters      two of four; 04/19/2017 0541 alp                  Indication for use: Osteo s/p amputation    Current Vancomycin Dose:  1750 mg IVPB every 12 hours, day 5 of therapy.      Assessment/Plan:  Trough ordered for tomorrow. Recommend to consider de-escalation.  E.coli from wound culture is sensitive to rocephin.  Rocephin / Vancomycin will be much easier on the kidneys for prolonged treatment than vanc / zosyn.  Rocephin also has the benefit of once daily dosing if patient is going home.  Pharmacy will continue to monitor renal function and adjust dose accordingly.    Dany Ramos III, [facility]   04/21/17 10:20 AM

## 2017-04-21 NOTE — PROGRESS NOTES
Podiatry/Surgery Progress Note    S: Seen at bedside. Doing well. Notes some pain, but overall controlled.    O:  Vitals:    04/21/17 1700   BP: 148/68   Pulse: 60   Resp: 18   Temp: 96.9 °F (36.1 °C)   SpO2: 94%       Physical Exam   Constitutional: he appears well-developed and well-nourished.   HEENT: Normocephalic. Atraumatic  CV: No tenderness. RRR  Resp: Non-labored respiration. No wheezes.   Psychiatric: he has a normal mood and affect. his   behavior is normal.      Lower Extremity Exam:  Vascular: DP/PT pulses palpable 1+.   Negative hair growth.   Minimal perimalleolar edema  Neuro: Protective sensation diminished, b/l  DTRs intact  Integument: No open wounds or lesions.  Atrophic skin noted b/l with chronic venous stasis dermatitis changes  Left foot incision site coapted with sutures in place. No SOI.   Lateral midfoot skin graft intact with some central necrosis  Musculoskeletal: LE muscle strength 4/5.           WBC   Date Value Ref Range Status   04/21/2017 7.04 3.20 - 9.80 10*3/mm3 Final     RBC   Date Value Ref Range Status   04/21/2017 3.74 (L) 4.37 - 5.74 10*6/mm3 Final     Hemoglobin   Date Value Ref Range Status   04/21/2017 12.3 (L) 13.7 - 17.3 g/dL Final     Hematocrit   Date Value Ref Range Status   04/21/2017 35.2 (L) 39.0 - 49.0 % Final     MCV   Date Value Ref Range Status   04/21/2017 94.1 80.0 - 98.0 fL Final     MCH   Date Value Ref Range Status   04/21/2017 32.9 26.5 - 34.0 pg Final     MCHC   Date Value Ref Range Status   04/21/2017 34.9 31.5 - 36.3 g/dL Final     RDW   Date Value Ref Range Status   04/21/2017 12.4 11.5 - 14.5 % Final     RDW-SD   Date Value Ref Range Status   04/21/2017 42.4 35.1 - 43.9 fl Final     MPV   Date Value Ref Range Status   04/21/2017 10.8 8.0 - 12.0 fL Final     Platelets   Date Value Ref Range Status   04/21/2017 123 (L) 150 - 450 10*3/mm3 Final     Neutrophil %   Date Value Ref Range Status   04/21/2017 50.5 37.0 - 80.0 % Final     Lymphocyte %   Date  Value Ref Range Status   04/21/2017 31.8 10.0 - 50.0 % Final     Monocyte %   Date Value Ref Range Status   04/21/2017 10.1 0.0 - 12.0 % Final     Eosinophil %   Date Value Ref Range Status   04/21/2017 7.0 0.0 - 7.0 % Final     Basophil %   Date Value Ref Range Status   04/21/2017 0.3 0.0 - 2.0 % Final     Immature Grans %   Date Value Ref Range Status   04/21/2017 0.3 0.0 - 0.5 % Final     Neutrophils, Absolute   Date Value Ref Range Status   04/21/2017 3.56 2.00 - 8.60 10*3/mm3 Final     Lymphocytes, Absolute   Date Value Ref Range Status   04/21/2017 2.24 0.60 - 4.20 10*3/mm3 Final     Monocytes, Absolute   Date Value Ref Range Status   04/21/2017 0.71 0.00 - 0.90 10*3/mm3 Final     Eosinophils, Absolute   Date Value Ref Range Status   04/21/2017 0.49 0.00 - 0.70 10*3/mm3 Final     Basophils, Absolute   Date Value Ref Range Status   04/21/2017 0.02 0.00 - 0.20 10*3/mm3 Final     Immature Grans, Absolute   Date Value Ref Range Status   04/21/2017 0.02 0.00 - 0.02 10*3/mm3 Final     nRBC   Date Value Ref Range Status   04/19/2017 0.0 0.0 - 0.0 /100 WBC Final           A/P:  1. Left foot osteomyelitis, s/p Transmetatarsal amputation, POD 2   2. Left foot ulcer, sp full thickness skin graft.  3. History of right TMA    Splint removed at bedside. Dressing changed and packing pulled. Pt progressing well. Will require CAM boot prior to discharge. Leave dressing c/d/i. Cont NWB to LLE. Uncertain of need  For broad spectrum antibiotic at this time given past cultures, however agree with 2 week total coverage. Will d/w hospitalist.    Please call with questions.          This document has been electronically signed by Estuardo Fried DPM on April 21, 2017 6:21 PM

## 2017-04-22 LAB
ANION GAP SERPL CALCULATED.3IONS-SCNC: 11 MMOL/L (ref 5–15)
BACTERIA SPEC AEROBE CULT: ABNORMAL
BASOPHILS # BLD AUTO: 0.03 10*3/MM3 (ref 0–0.2)
BASOPHILS NFR BLD AUTO: 0.4 % (ref 0–2)
BUN BLD-MCNC: 11 MG/DL (ref 7–21)
BUN/CREAT SERPL: 12.6 (ref 7–25)
CALCIUM SPEC-SCNC: 8.2 MG/DL (ref 8.4–10.2)
CHLORIDE SERPL-SCNC: 99 MMOL/L (ref 95–110)
CO2 SERPL-SCNC: 27 MMOL/L (ref 22–31)
CREAT BLD-MCNC: 0.87 MG/DL (ref 0.7–1.3)
DEPRECATED RDW RBC AUTO: 41.7 FL (ref 35.1–43.9)
EOSINOPHIL # BLD AUTO: 0.65 10*3/MM3 (ref 0–0.7)
EOSINOPHIL NFR BLD AUTO: 8.1 % (ref 0–7)
ERYTHROCYTE [DISTWIDTH] IN BLOOD BY AUTOMATED COUNT: 12.3 % (ref 11.5–14.5)
GFR SERPL CREATININE-BSD FRML MDRD: 90 ML/MIN/1.73 (ref 60–130)
GLUCOSE BLD-MCNC: 88 MG/DL (ref 60–100)
GRAM STN SPEC: ABNORMAL
HCT VFR BLD AUTO: 36.9 % (ref 39–49)
HGB BLD-MCNC: 13 G/DL (ref 13.7–17.3)
IMM GRANULOCYTES # BLD: 0.02 10*3/MM3 (ref 0–0.02)
IMM GRANULOCYTES NFR BLD: 0.2 % (ref 0–0.5)
ISOLATED FROM: ABNORMAL
LYMPHOCYTES # BLD AUTO: 2.35 10*3/MM3 (ref 0.6–4.2)
LYMPHOCYTES NFR BLD AUTO: 29.1 % (ref 10–50)
MCH RBC QN AUTO: 32.7 PG (ref 26.5–34)
MCHC RBC AUTO-ENTMCNC: 35.2 G/DL (ref 31.5–36.3)
MCV RBC AUTO: 92.9 FL (ref 80–98)
MONOCYTES # BLD AUTO: 0.7 10*3/MM3 (ref 0–0.9)
MONOCYTES NFR BLD AUTO: 8.7 % (ref 0–12)
NEUTROPHILS # BLD AUTO: 4.32 10*3/MM3 (ref 2–8.6)
NEUTROPHILS NFR BLD AUTO: 53.5 % (ref 37–80)
PLATELET # BLD AUTO: 137 10*3/MM3 (ref 150–450)
PMV BLD AUTO: 10.5 FL (ref 8–12)
POTASSIUM BLD-SCNC: 3.6 MMOL/L (ref 3.5–5.1)
RBC # BLD AUTO: 3.97 10*6/MM3 (ref 4.37–5.74)
SODIUM BLD-SCNC: 137 MMOL/L (ref 137–145)
VANCOMYCIN TROUGH SERPL-MCNC: 21.78 MCG/ML (ref 10–15)
WBC NRBC COR # BLD: 8.07 10*3/MM3 (ref 3.2–9.8)

## 2017-04-22 PROCEDURE — 25010000002 ENOXAPARIN PER 10 MG: Performed by: NURSE PRACTITIONER

## 2017-04-22 PROCEDURE — 80048 BASIC METABOLIC PNL TOTAL CA: CPT | Performed by: NURSE PRACTITIONER

## 2017-04-22 PROCEDURE — 25010000002 CEFTRIAXONE: Performed by: INTERNAL MEDICINE

## 2017-04-22 PROCEDURE — 80202 ASSAY OF VANCOMYCIN: CPT | Performed by: NURSE PRACTITIONER

## 2017-04-22 PROCEDURE — 25010000002 MORPHINE SULFATE (PF) 2 MG/ML SOLUTION: Performed by: FAMILY MEDICINE

## 2017-04-22 PROCEDURE — 85025 COMPLETE CBC W/AUTO DIFF WBC: CPT | Performed by: NURSE PRACTITIONER

## 2017-04-22 PROCEDURE — 25010000002 VANCOMYCIN PER 500 MG: Performed by: INTERNAL MEDICINE

## 2017-04-22 RX ADMIN — LISINOPRIL 20 MG: 20 TABLET ORAL at 09:23

## 2017-04-22 RX ADMIN — CEFTRIAXONE 1 G: 1 INJECTION, POWDER, FOR SOLUTION INTRAMUSCULAR; INTRAVENOUS at 18:08

## 2017-04-22 RX ADMIN — DOCUSATE SODIUM 100 MG: 100 CAPSULE, LIQUID FILLED ORAL at 18:08

## 2017-04-22 RX ADMIN — DOCUSATE SODIUM 100 MG: 100 CAPSULE, LIQUID FILLED ORAL at 09:23

## 2017-04-22 RX ADMIN — HYDROCODONE BITARTRATE AND ACETAMINOPHEN 1 TABLET: 7.5; 325 TABLET ORAL at 09:22

## 2017-04-22 RX ADMIN — HYDROCHLOROTHIAZIDE 25 MG: 25 TABLET ORAL at 09:23

## 2017-04-22 RX ADMIN — SODIUM CHLORIDE, PRESERVATIVE FREE 10 ML: 5 INJECTION INTRAVENOUS at 09:24

## 2017-04-22 RX ADMIN — VANCOMYCIN HYDROCHLORIDE 1500 MG: 1 INJECTION, POWDER, LYOPHILIZED, FOR SOLUTION INTRAVENOUS at 20:57

## 2017-04-22 RX ADMIN — SODIUM CHLORIDE, PRESERVATIVE FREE 10 ML: 5 INJECTION INTRAVENOUS at 20:58

## 2017-04-22 RX ADMIN — MORPHINE SULFATE 2 MG: 2 INJECTION, SOLUTION INTRAMUSCULAR; INTRAVENOUS at 05:18

## 2017-04-22 RX ADMIN — VANCOMYCIN HYDROCHLORIDE 1750 MG: 1 INJECTION, POWDER, LYOPHILIZED, FOR SOLUTION INTRAVENOUS at 03:00

## 2017-04-22 RX ADMIN — PANTOPRAZOLE SODIUM 40 MG: 40 TABLET, DELAYED RELEASE ORAL at 05:22

## 2017-04-22 RX ADMIN — HYDROCODONE BITARTRATE AND ACETAMINOPHEN 1 TABLET: 7.5; 325 TABLET ORAL at 15:18

## 2017-04-22 NOTE — PROGRESS NOTES
"Pharmacokinetics by Pharmacy - Vancomycin    Damian Wild is a 56 y.o. male  [Ht: 70\" (177.8 cm); Wt: 213 lb 3.2 oz (96.7 kg)]    Estimated Creatinine Clearance: 108 mL/min (by C-G formula based on Cr of 0.87).   Lab Results   Component Value Date    CREATININE 0.87 04/22/2017    CREATININE 0.74 04/21/2017    CREATININE 0.75 04/20/2017      Lab Results   Component Value Date    WBC 8.07 04/22/2017    WBC 7.04 04/21/2017    WBC 6.22 04/20/2017      Temp Readings from Last 1 Encounters:   04/22/17 97.2 °F (36.2 °C)      Lab Results   Component Value Date    VANCOTROUGH 21.78 (H) 04/22/2017         Culture Results:  Microbiology Results (last 10 days)       Procedure Component Value - Date/Time      Wound Culture [[id number]]  (Abnormal)  (Susceptibility) Collected:  04/17/17 1753    Lab Status:  Final result Specimen:  Wound from Foot, Left Updated:  04/19/17 1030     Wound Culture Light growth (2+) Escherichia coli (A)     Beta Lactamase --     Gram Stain Result Rare (1+) WBCs seen      Rare (1+) Gram positive cocci in pairs    Narrative:       Slide reviewed confirmed    Susceptibility        Escherichia coli     JAUN     Amikacin <=16 ug/ml Susceptible     Ampicillin >16 ug/ml Resistant     Ampicillin + Sulbactam <=8/4 ug/ml Susceptible     Cefazolin <=8 ug/ml Susceptible     Cefoxitin <=8 ug/ml Susceptible     Ceftazidime <=1 ug/ml Susceptible     Ceftriaxone <=8 ug/ml Susceptible     Ciprofloxacin <=1 ug/ml Susceptible     Gentamicin <=4 ug/ml Susceptible     Levofloxacin <=2 ug/ml Susceptible     Piperacillin + Tazobactam <=16 ug/ml Susceptible     Tobramycin <=4 ug/ml Susceptible     Trimethoprim + Sulfamethoxazole <=2/38 ug/ml Susceptible                      Blood Culture [[id number]]  (Abnormal) Collected:  04/17/17 1741    Lab Status:  Final result Specimen:  Blood from Arm, Left Updated:  04/19/17 0719     Blood Culture Abnormal Stain (A)      Staphylococcus, coagulase negative (A)      Consistent with " contamination at time of collection.          Gram Stain Result Aerobic Bottle Gram positive cocci in clusters    Blood Culture ID, PCR [[id number]]  (Abnormal) Collected:  04/17/17 1741    Lab Status:  Final result Specimen:  Blood from Arm, Left Updated:  04/18/17 1802     BCID, PCR Staphylococcus spp, not aureus. Identification by BCID PCR. (C)    Narrative:         Sensitivity to Follow    Blood Culture [[id number]]  (Abnormal)  (Susceptibility) Collected:  04/17/17 1731    Lab Status:  Final result Specimen:  Blood from Arm, Right Updated:  04/22/17 1021     Blood Culture Staphylococcus epidermidis (A)     Isolated from Anaerobic Bottle     Gram Stain Result Anaerobic Bottle Gram positive cocci in clusters      two of four; 04/19/2017 0541 alp       Susceptibility        Staphylococcus epidermidis     JAUN     Erythromycin >4 ug/ml Resistant     Gentamicin <=4 ug/ml Susceptible     Oxacillin <=0.25 ug/ml Susceptible     Penicillin G 2 ug/ml Resistant     Rifampin <=1 ug/ml Susceptible     Trimethoprim + Sulfamethoxazole <=0.5/9.5 ug/ml Susceptible     Vancomycin 2 ug/ml Susceptible                                 Indication for use: Osteomyelitis s/p amputation     Current Vancomycin Dose:  1500 mg IVPB every 12 hours, day 6 of therapy.      Assessment/Plan:  Trough slightly elevated (goal 15-20).  Will decrease dose to 1500 mg Pharmacy will continue to monitor renal function and adjust dose accordingly.    Dany Ramos III, [facility]   04/22/17 2:08 PM

## 2017-04-22 NOTE — PLAN OF CARE
Problem: Infection, Risk/Actual (Adult)  Goal: Identify Related Risk Factors and Signs and Symptoms  Outcome: Outcome(s) achieved Date Met:  04/22/17  Goal: Infection Prevention/Resolution  Outcome: Ongoing (interventions implemented as appropriate)

## 2017-04-22 NOTE — PROGRESS NOTES
Subjective: Patient is doing well this morning.  His discharge was canceled yesterday due to lack of beds at the nursing home.  He shouldn't a sense scheduled for discharge on Monday.  He voices no new complaints.    Objective: His vital signs are stable and he is afebrile.  Cardiovascular: S1 and S2  Lungs: Clear  Abdomen: Soft, nontender and bowel sounds are present.  Extremities: Dressing on the left foot persists.    Assessment and plan:  Osteomyelitis of left foot: Status post left transmetatarsal amputation - continue on current management.  Discharge planned for Monday.

## 2017-04-23 LAB
ANION GAP SERPL CALCULATED.3IONS-SCNC: 12 MMOL/L (ref 5–15)
BASOPHILS # BLD AUTO: 0.04 10*3/MM3 (ref 0–0.2)
BASOPHILS NFR BLD AUTO: 0.5 % (ref 0–2)
BUN BLD-MCNC: 11 MG/DL (ref 7–21)
BUN/CREAT SERPL: 15.5 (ref 7–25)
CALCIUM SPEC-SCNC: 8.6 MG/DL (ref 8.4–10.2)
CHLORIDE SERPL-SCNC: 106 MMOL/L (ref 95–110)
CO2 SERPL-SCNC: 21 MMOL/L (ref 22–31)
CREAT BLD-MCNC: 0.71 MG/DL (ref 0.7–1.3)
DEPRECATED RDW RBC AUTO: 41.8 FL (ref 35.1–43.9)
EOSINOPHIL # BLD AUTO: 0.69 10*3/MM3 (ref 0–0.7)
EOSINOPHIL NFR BLD AUTO: 8.9 % (ref 0–7)
ERYTHROCYTE [DISTWIDTH] IN BLOOD BY AUTOMATED COUNT: 12.6 % (ref 11.5–14.5)
GFR SERPL CREATININE-BSD FRML MDRD: 114 ML/MIN/1.73 (ref 56–130)
GLUCOSE BLD-MCNC: 164 MG/DL (ref 60–100)
HCT VFR BLD AUTO: 38.7 % (ref 39–49)
HGB BLD-MCNC: 14 G/DL (ref 13.7–17.3)
IMM GRANULOCYTES # BLD: 0.01 10*3/MM3 (ref 0–0.02)
IMM GRANULOCYTES NFR BLD: 0.1 % (ref 0–0.5)
LYMPHOCYTES # BLD AUTO: 2.5 10*3/MM3 (ref 0.6–4.2)
LYMPHOCYTES NFR BLD AUTO: 32.1 % (ref 10–50)
MCH RBC QN AUTO: 33.1 PG (ref 26.5–34)
MCHC RBC AUTO-ENTMCNC: 36.2 G/DL (ref 31.5–36.3)
MCV RBC AUTO: 91.5 FL (ref 80–98)
MONOCYTES # BLD AUTO: 0.65 10*3/MM3 (ref 0–0.9)
MONOCYTES NFR BLD AUTO: 8.4 % (ref 0–12)
NEUTROPHILS # BLD AUTO: 3.89 10*3/MM3 (ref 2–8.6)
NEUTROPHILS NFR BLD AUTO: 50 % (ref 37–80)
NRBC BLD MANUAL-RTO: 0 /100 WBC (ref 0–0)
PLATELET # BLD AUTO: 157 10*3/MM3 (ref 150–450)
PMV BLD AUTO: 9.9 FL (ref 8–12)
POTASSIUM BLD-SCNC: 4.2 MMOL/L (ref 3.5–5.1)
RBC # BLD AUTO: 4.23 10*6/MM3 (ref 4.37–5.74)
SODIUM BLD-SCNC: 139 MMOL/L (ref 137–145)
WBC NRBC COR # BLD: 7.78 10*3/MM3 (ref 3.2–9.8)

## 2017-04-23 PROCEDURE — 85025 COMPLETE CBC W/AUTO DIFF WBC: CPT | Performed by: NURSE PRACTITIONER

## 2017-04-23 PROCEDURE — 25010000002 CEFTRIAXONE: Performed by: INTERNAL MEDICINE

## 2017-04-23 PROCEDURE — 99024 POSTOP FOLLOW-UP VISIT: CPT | Performed by: PODIATRIST

## 2017-04-23 PROCEDURE — 25010000002 VANCOMYCIN PER 500 MG: Performed by: INTERNAL MEDICINE

## 2017-04-23 PROCEDURE — 25010000002 MORPHINE SULFATE (PF) 2 MG/ML SOLUTION: Performed by: FAMILY MEDICINE

## 2017-04-23 PROCEDURE — 80048 BASIC METABOLIC PNL TOTAL CA: CPT | Performed by: NURSE PRACTITIONER

## 2017-04-23 RX ADMIN — HYDROCODONE BITARTRATE AND ACETAMINOPHEN 1 TABLET: 7.5; 325 TABLET ORAL at 11:48

## 2017-04-23 RX ADMIN — PANTOPRAZOLE SODIUM 40 MG: 40 TABLET, DELAYED RELEASE ORAL at 06:14

## 2017-04-23 RX ADMIN — SODIUM CHLORIDE, PRESERVATIVE FREE 10 ML: 5 INJECTION INTRAVENOUS at 21:50

## 2017-04-23 RX ADMIN — DOCUSATE SODIUM 100 MG: 100 CAPSULE, LIQUID FILLED ORAL at 17:24

## 2017-04-23 RX ADMIN — CEFTRIAXONE 1 G: 1 INJECTION, POWDER, FOR SOLUTION INTRAMUSCULAR; INTRAVENOUS at 17:24

## 2017-04-23 RX ADMIN — DOCUSATE SODIUM 100 MG: 100 CAPSULE, LIQUID FILLED ORAL at 08:33

## 2017-04-23 RX ADMIN — HYDROCODONE BITARTRATE AND ACETAMINOPHEN 1 TABLET: 7.5; 325 TABLET ORAL at 17:24

## 2017-04-23 RX ADMIN — MORPHINE SULFATE 2 MG: 2 INJECTION, SOLUTION INTRAMUSCULAR; INTRAVENOUS at 15:18

## 2017-04-23 RX ADMIN — VANCOMYCIN HYDROCHLORIDE 1500 MG: 1 INJECTION, POWDER, LYOPHILIZED, FOR SOLUTION INTRAVENOUS at 21:49

## 2017-04-23 RX ADMIN — MORPHINE SULFATE 2 MG: 2 INJECTION, SOLUTION INTRAMUSCULAR; INTRAVENOUS at 05:21

## 2017-04-23 RX ADMIN — LISINOPRIL 20 MG: 20 TABLET ORAL at 08:33

## 2017-04-23 RX ADMIN — HYDROCHLOROTHIAZIDE 25 MG: 25 TABLET ORAL at 08:33

## 2017-04-23 RX ADMIN — SODIUM CHLORIDE, PRESERVATIVE FREE 10 ML: 5 INJECTION INTRAVENOUS at 08:33

## 2017-04-23 RX ADMIN — HYDROCODONE BITARTRATE AND ACETAMINOPHEN 1 TABLET: 7.5; 325 TABLET ORAL at 06:14

## 2017-04-23 RX ADMIN — VANCOMYCIN HYDROCHLORIDE 1500 MG: 1 INJECTION, POWDER, LYOPHILIZED, FOR SOLUTION INTRAVENOUS at 08:33

## 2017-04-23 RX ADMIN — MORPHINE SULFATE 2 MG: 2 INJECTION, SOLUTION INTRAMUSCULAR; INTRAVENOUS at 21:55

## 2017-04-23 RX ADMIN — HYDROCODONE BITARTRATE AND ACETAMINOPHEN 1 TABLET: 7.5; 325 TABLET ORAL at 00:41

## 2017-04-23 NOTE — PLAN OF CARE
Problem: Patient Care Overview (Adult)  Goal: Plan of Care Review  Outcome: Ongoing (interventions implemented as appropriate)    04/23/17 0412   Coping/Psychosocial Response Interventions   Plan Of Care Reviewed With patient   Patient Care Overview   Progress improving       Goal: Adult Individualization and Mutuality  Outcome: Ongoing (interventions implemented as appropriate)  Goal: Discharge Needs Assessment  Outcome: Ongoing (interventions implemented as appropriate)    04/23/17 0412   Discharge Needs Assessment   Concerns To Be Addressed discharge planning concerns   Readmission Within The Last 30 Days no previous admission in last 30 days         Problem: Infection, Risk/Actual (Adult)  Goal: Infection Prevention/Resolution  Outcome: Ongoing (interventions implemented as appropriate)    04/23/17 0412   Infection, Risk/Actual (Adult)   Infection Prevention/Resolution making progress toward outcome

## 2017-04-23 NOTE — PROGRESS NOTES
[facility] Medicine Services  INPATIENT PROGRESS NOTE     LOS: 6 days   Patient Care Team:  BROOKLYNN Gu as PCP - General    Chief Complaint: He voices no complaints today.  He continues to ambulate independently with walker.      Subjective     Interval History:     Patient Complaints: None    History taken from: Patient    Review of Systems:    Review of Systems: Unremarkable      Objective     Vital Signs  Temp:  [96.1 °F (35.6 °C)-97.3 °F (36.3 °C)] 97.3 °F (36.3 °C)  Heart Rate:  [56-67] 67  Resp:  [18-19] 19  BP: (131-145)/(70-82) 131/70    Physical Exam:   Physical Exam   Constitutional: He is oriented to person, place, and time.   Cardiovascular: Normal rate and regular rhythm.  Exam reveals no gallop.    No murmur heard.  Pulmonary/Chest: Effort normal. He has no wheezes. He has no rales.   Abdominal: Soft. Bowel sounds are normal. There is no tenderness.   Neurological: He is alert and oriented to person, place, and time.   Skin: Skin is warm and dry.    Extremities: Dressing left foot       Results Review:       Results from last 7 days  Lab Units 04/23/17  0833 04/22/17  0605 04/21/17  0543 04/20/17  0633 04/19/17  0607 04/18/17  0529 04/17/17  1741   SODIUM mmol/L 139 137 138 137 142 138 131*   POTASSIUM mmol/L 4.2 3.6 3.8 4.3 4.5 4.7 4.6   CHLORIDE mmol/L 106 99 100 98 101 99 94*   TOTAL CO2 mmol/L 21.0* 27.0 30.0 29.0 29.0 30.0 26.0   BUN mg/dL 11 11 9 11 14 15 16   CREATININE mg/dL 0.71 0.87 0.74 0.75 0.89 0.86 0.83   GLUCOSE mg/dL 164* 88 87 90 91 79 73   CALCIUM mg/dL 8.6 8.2* 8.0* 7.8* 8.1* 8.5 8.9               Results from last 7 days  Lab Units 04/23/17  0640 04/22/17  0605 04/21/17  0543 04/20/17  0633 04/19/17  0607 04/18/17  0529 04/17/17  1741   WBC 10*3/mm3 7.78 8.07 7.04 6.22 5.83 6.52 8.60   HEMOGLOBIN g/dL 14.0 13.0* 12.3* 12.2* 14.2 13.9 14.0   HEMATOCRIT % 38.7* 36.9* 35.2* 35.9* 42.0 40.7 39.4   PLATELETS 10*3/mm3 157 137* 123*  122* 129* 135* 151       No results found for: CKTOTAL, CKMB, CKMBINDEX, TROPONINI, TROPONINT    CO2   Date Value Ref Range Status   04/23/2017 21.0 (L) 22.0 - 31.0 mmol/L Final              Imaging Results (last 7 days)     Procedure Component Value Units Date/Time    MRI Foot Left With & Without Contrast [[id number]] Collected:  04/18/17 1740     Updated:  04/18/17 1748    Narrative:       Patient Name:  BRIANA ROTHMAN  Patient ID:  [id number]   Ordering:  JOLENE CAR  Attending:  LEXI MANNING  Referring:  JOLENE CAR  ------------------------------------------------    MRI left foot with and without contrast.    HISTORY: Evaluate for osteomyelitis.  Prior examination left foot March 28, 2017.    TECHNIQUE: Multiplanar multisequence images left foot obtained  both prior to and following the intervenous infusion of  gadolinium, 20 mL of ProHance. There is been amputation of the  great toe and distal aspect of the first metatarsal. There is a  small fluid collection adjacent to the stump of the distal first  metatarsal.    The remaining portions of the first metatarsal are unremarkable  with normal marrow signal intensity.    There is bone edema and findings suggesting cortical destruction  of the distal aspect of the second metatarsal. These findings are  suspicious for bony infection, osteomyelitis.      Impression:       CONCLUSION: Amputation distal first metatarsal and great toe.  Small fluid collection adjacent to the distal aspect of first  metatarsal most likely development of a small postsurgical bursa  at the stump.    Abnormal distal aspect of second metatarsal with bone edema,  subtle enhancement and some questionable cortical destruction.  These findings are suspicious for bony infection, osteomyelitis.    Electronically signed by:  Yonny Montoya MD  4/18/2017 5:47 PM CDT  Workstation: [id number]    IR PICC wo fluoro guidance [[id number]] Resulted:  04/20/17 1139     Updated:  04/20/17  1139    Narrative:       This procedure was auto-finalized with no dictation required.    US Guided Vascular Access [[id number]] Resulted:  04/20/17 1321     Updated:  04/20/17 1321    Narrative:       This procedure was auto-finalized with no dictation required.    XR Chest Post CVA Port [[id number]] Collected:  04/20/17 1149     Updated:  04/20/17 1510    Narrative:       Patient Name:  BRIANA ROTHMAN  Patient ID:  [id number]   Ordering:  VANNA MOREL  Attending:  LEXI MANNING  Referring:  VANNA MOREL  ------------------------------------------------  Chest single view  CLINICAL INDICATION: PICC line placement.    COMPARISON: None    FINDINGS: Cardiac silhouette is normal in size. Pulmonary  vascularity is unremarkable.     No focal infiltrate or consolidation.  No pleural effusion.  No  pneumothorax.      Impression:       CONCLUSION: Right arm PICC line catheter. Catheter tip in  superior vena cava, in satisfactory position.    Electronically signed by:  Yonny Montoya MD  4/20/2017 11:50 AM CDT  Workstation: [id number]           Blood Culture   Date Value Ref Range Status   04/17/2017 Abnormal Stain (A)  Final   04/17/2017 Staphylococcus, coagulase negative (A)  Final     Comment:     Consistent with contamination at time of collection.       BCID, PCR   Date Value Ref Range Status   04/17/2017 (C) Negative by BCID PCR. Culture to Follow., No organism detected by BCID PCR. Final    Staphylococcus spp, not aureus. Identification by BCID PCR.                    Wound Culture   Date Value Ref Range Status   04/17/2017 Light growth (2+) Escherichia coli (A)  Final        Medication Review:   Current Facility-Administered Medications   Medication Dose Route Frequency Provider Last Rate Last Dose   • bupivacaine (PF) (MARCAINE) 0.5 % injection    PRN Estuardo Fried DPM   28 mL at 04/19/17 1146   • cefTRIAXone (ROCEPHIN) 1 g/100 mL 0.9% NS (MBP)  1 g Intravenous Q24H Vanna Morel MD   1 g  at 04/22/17 1808   • docusate sodium (COLACE) capsule 100 mg  100 mg Oral BID Khushbu Santos, APRN   100 mg at 04/23/17 0833   • enoxaparin (LOVENOX) syringe 40 mg  40 mg Subcutaneous Daily Khushbu Santos APRN   40 mg at 04/17/17 2011   • hydrochlorothiazide (HYDRODIURIL) tablet 25 mg  25 mg Oral Daily Khushbu Santos APRN   25 mg at 04/23/17 0833   • HYDROcodone-acetaminophen (NORCO) 7.5-325 MG per tablet 1 tablet  1 tablet Oral Q6H PRN Estuardo Fried DPM   1 tablet at 04/23/17 1148   • lisinopril (PRINIVIL,ZESTRIL) tablet 20 mg  20 mg Oral Q24H Khushbu Santos APRN   20 mg at 04/23/17 0833   • Morphine sulfate (PF) injection 1 mg  1 mg Intravenous Q4H PRN Alec Falcon MD   1 mg at 04/18/17 1930   • Morphine sulfate (PF) injection 2 mg  2 mg Intravenous Q4H PRN Alec Falcon MD   2 mg at 04/23/17 0521   • ondansetron (ZOFRAN) tablet 4 mg  4 mg Oral Q6H PRN BROOKLYNN Szymanski       • pantoprazole (PROTONIX) EC tablet 40 mg  40 mg Oral Q AM Khushbu Santos APRN   40 mg at 04/23/17 0614   • Pharmacy to dose vancomycin   Does not apply Continuous PRN BROOKLYNN Szymanski       • sodium chloride 0.9 % flush 1-10 mL  1-10 mL Intravenous PRN BROOKLYNN Szymanski   10 mL at 04/20/17 0326   • sodium chloride 0.9 % flush 10 mL  10 mL Intravenous Q12H Nakul Sun MD   10 mL at 04/23/17 0833   • vancomycin (VANCOCIN) 1,500 mg in sodium chloride 0.9 % 500 mL IVPB  1,500 mg Intravenous Q12H Nakul Sun MD   1,500 mg at 04/23/17 0833         Assessment/Plan     Osteomyelitis of left foot: Status post left transmetatarsal amputation.  Patient was discharged on Friday but the discharge was canceled because of nonavailability of bed in the nursing home.  He is to be discharged tomorrow and to continue intravenous antibiotic for at least the next 10-12 days days.      Shayan Morel MD  04/23/17      EMR Dragon/Transcription disclaimer:   Much of this encounter note is an electronic  transcription/translation of spoken language to printed text. The electronic translation of spoken language may permit erroneous, or at times, nonsensical words or phrases to be inadvertently transcribed; Although I have reviewed the note for such errors, some may still exist.

## 2017-04-23 NOTE — PLAN OF CARE
Problem: Patient Care Overview (Adult)  Goal: Plan of Care Review  Outcome: Ongoing (interventions implemented as appropriate)    04/23/17 1427   Coping/Psychosocial Response Interventions   Plan Of Care Reviewed With patient   Patient Care Overview   Progress no change       Goal: Adult Individualization and Mutuality  Outcome: Ongoing (interventions implemented as appropriate)  Goal: Discharge Needs Assessment  Outcome: Ongoing (interventions implemented as appropriate)    Problem: Infection, Risk/Actual (Adult)  Goal: Infection Prevention/Resolution  Outcome: Ongoing (interventions implemented as appropriate)

## 2017-04-24 LAB
ANION GAP SERPL CALCULATED.3IONS-SCNC: 12 MMOL/L (ref 5–15)
BASOPHILS # BLD AUTO: 0.06 10*3/MM3 (ref 0–0.2)
BASOPHILS NFR BLD AUTO: 0.8 % (ref 0–2)
BUN BLD-MCNC: 12 MG/DL (ref 7–21)
BUN/CREAT SERPL: 16.7 (ref 7–25)
CALCIUM SPEC-SCNC: 9 MG/DL (ref 8.4–10.2)
CHLORIDE SERPL-SCNC: 102 MMOL/L (ref 95–110)
CO2 SERPL-SCNC: 26 MMOL/L (ref 22–31)
CREAT BLD-MCNC: 0.72 MG/DL (ref 0.7–1.3)
DEPRECATED RDW RBC AUTO: 42 FL (ref 35.1–43.9)
EOSINOPHIL # BLD AUTO: 0.6 10*3/MM3 (ref 0–0.7)
EOSINOPHIL NFR BLD AUTO: 8.1 % (ref 0–7)
ERYTHROCYTE [DISTWIDTH] IN BLOOD BY AUTOMATED COUNT: 12.5 % (ref 11.5–14.5)
GFR SERPL CREATININE-BSD FRML MDRD: 112 ML/MIN/1.73 (ref 56–130)
GLUCOSE BLD-MCNC: 109 MG/DL (ref 60–100)
HCT VFR BLD AUTO: 38.8 % (ref 39–49)
HGB BLD-MCNC: 13.5 G/DL (ref 13.7–17.3)
IMM GRANULOCYTES # BLD: 0.01 10*3/MM3 (ref 0–0.02)
IMM GRANULOCYTES NFR BLD: 0.1 % (ref 0–0.5)
LYMPHOCYTES # BLD AUTO: 2.19 10*3/MM3 (ref 0.6–4.2)
LYMPHOCYTES NFR BLD AUTO: 29.5 % (ref 10–50)
MCH RBC QN AUTO: 32.5 PG (ref 26.5–34)
MCHC RBC AUTO-ENTMCNC: 34.8 G/DL (ref 31.5–36.3)
MCV RBC AUTO: 93.5 FL (ref 80–98)
MONOCYTES # BLD AUTO: 0.65 10*3/MM3 (ref 0–0.9)
MONOCYTES NFR BLD AUTO: 8.8 % (ref 0–12)
NEUTROPHILS # BLD AUTO: 3.91 10*3/MM3 (ref 2–8.6)
NEUTROPHILS NFR BLD AUTO: 52.7 % (ref 37–80)
NRBC BLD MANUAL-RTO: 0 /100 WBC (ref 0–0)
PLATELET # BLD AUTO: 156 10*3/MM3 (ref 150–450)
PMV BLD AUTO: 10.2 FL (ref 8–12)
POTASSIUM BLD-SCNC: 3.7 MMOL/L (ref 3.5–5.1)
RBC # BLD AUTO: 4.15 10*6/MM3 (ref 4.37–5.74)
SODIUM BLD-SCNC: 140 MMOL/L (ref 137–145)
VANCOMYCIN TROUGH SERPL-MCNC: 23.67 MCG/ML (ref 10–15)
WBC NRBC COR # BLD: 7.42 10*3/MM3 (ref 3.2–9.8)

## 2017-04-24 PROCEDURE — 25010000002 VANCOMYCIN PER 500 MG: Performed by: INTERNAL MEDICINE

## 2017-04-24 PROCEDURE — 80202 ASSAY OF VANCOMYCIN: CPT | Performed by: INTERNAL MEDICINE

## 2017-04-24 PROCEDURE — 80048 BASIC METABOLIC PNL TOTAL CA: CPT | Performed by: NURSE PRACTITIONER

## 2017-04-24 PROCEDURE — 25010000002 MORPHINE SULFATE (PF) 2 MG/ML SOLUTION: Performed by: FAMILY MEDICINE

## 2017-04-24 PROCEDURE — 25010000002 CEFTRIAXONE: Performed by: INTERNAL MEDICINE

## 2017-04-24 PROCEDURE — 85025 COMPLETE CBC W/AUTO DIFF WBC: CPT | Performed by: NURSE PRACTITIONER

## 2017-04-24 RX ADMIN — DOCUSATE SODIUM 100 MG: 100 CAPSULE, LIQUID FILLED ORAL at 08:27

## 2017-04-24 RX ADMIN — LISINOPRIL 20 MG: 20 TABLET ORAL at 08:27

## 2017-04-24 RX ADMIN — MORPHINE SULFATE 2 MG: 2 INJECTION, SOLUTION INTRAMUSCULAR; INTRAVENOUS at 21:06

## 2017-04-24 RX ADMIN — VANCOMYCIN HYDROCHLORIDE 1750 MG: 5 INJECTION, POWDER, LYOPHILIZED, FOR SOLUTION INTRAVENOUS at 13:25

## 2017-04-24 RX ADMIN — CEFTRIAXONE 1 G: 1 INJECTION, POWDER, FOR SOLUTION INTRAMUSCULAR; INTRAVENOUS at 16:35

## 2017-04-24 RX ADMIN — HYDROCODONE BITARTRATE AND ACETAMINOPHEN 1 TABLET: 7.5; 325 TABLET ORAL at 18:34

## 2017-04-24 RX ADMIN — MORPHINE SULFATE 2 MG: 2 INJECTION, SOLUTION INTRAMUSCULAR; INTRAVENOUS at 15:18

## 2017-04-24 RX ADMIN — SODIUM CHLORIDE, PRESERVATIVE FREE 10 ML: 5 INJECTION INTRAVENOUS at 21:05

## 2017-04-24 RX ADMIN — MORPHINE SULFATE 2 MG: 2 INJECTION, SOLUTION INTRAMUSCULAR; INTRAVENOUS at 08:27

## 2017-04-24 RX ADMIN — HYDROCHLOROTHIAZIDE 25 MG: 25 TABLET ORAL at 08:27

## 2017-04-24 RX ADMIN — SODIUM CHLORIDE, PRESERVATIVE FREE 10 ML: 5 INJECTION INTRAVENOUS at 08:27

## 2017-04-24 RX ADMIN — HYDROCODONE BITARTRATE AND ACETAMINOPHEN 1 TABLET: 7.5; 325 TABLET ORAL at 04:19

## 2017-04-24 RX ADMIN — HYDROCODONE BITARTRATE AND ACETAMINOPHEN 1 TABLET: 7.5; 325 TABLET ORAL at 10:48

## 2017-04-24 NOTE — PROGRESS NOTES
Podiatry/Surgery Progress Note    S: Seen at bedside. Doing well. Pain improving.    O:  Vitals:    04/24/17 0402   BP: 158/86   Pulse: 69   Resp: 18   Temp: 97 °F (36.1 °C)   SpO2: 99%       Physical Exam   Constitutional: he appears well-developed and well-nourished.   HEENT: Normocephalic. Atraumatic  CV: No tenderness. RRR  Resp: Non-labored respiration. No wheezes.   Psychiatric: he has a normal mood and affect. his   behavior is normal.      Lower Extremity Exam:  Vascular: DP/PT pulses palpable 1+.   Negative hair growth.   Minimal perimalleolar edema  Neuro: Protective sensation diminished, b/l  DTRs intact  Integument: No open wounds or lesions.  Atrophic skin noted b/l with chronic venous stasis dermatitis changes  Left foot incision site coapted with sutures in place. No SOI.   Lateral midfoot skin graft intact with some central necrosis. No SOI  Musculoskeletal: LE muscle strength 4/5.           WBC   Date Value Ref Range Status   04/24/2017 7.42 3.20 - 9.80 10*3/mm3 Final     RBC   Date Value Ref Range Status   04/24/2017 4.15 (L) 4.37 - 5.74 10*6/mm3 Final     Hemoglobin   Date Value Ref Range Status   04/24/2017 13.5 (L) 13.7 - 17.3 g/dL Final     Hematocrit   Date Value Ref Range Status   04/24/2017 38.8 (L) 39.0 - 49.0 % Final     MCV   Date Value Ref Range Status   04/24/2017 93.5 80.0 - 98.0 fL Final     MCH   Date Value Ref Range Status   04/24/2017 32.5 26.5 - 34.0 pg Final     MCHC   Date Value Ref Range Status   04/24/2017 34.8 31.5 - 36.3 g/dL Final     RDW   Date Value Ref Range Status   04/24/2017 12.5 11.5 - 14.5 % Final     RDW-SD   Date Value Ref Range Status   04/24/2017 42.0 35.1 - 43.9 fl Final     MPV   Date Value Ref Range Status   04/24/2017 10.2 8.0 - 12.0 fL Final     Platelets   Date Value Ref Range Status   04/24/2017 156 150 - 450 10*3/mm3 Final     Neutrophil %   Date Value Ref Range Status   04/24/2017 52.7 37.0 - 80.0 % Final     Lymphocyte %   Date Value Ref Range Status    04/24/2017 29.5 10.0 - 50.0 % Final     Monocyte %   Date Value Ref Range Status   04/24/2017 8.8 0.0 - 12.0 % Final     Eosinophil %   Date Value Ref Range Status   04/24/2017 8.1 (H) 0.0 - 7.0 % Final     Basophil %   Date Value Ref Range Status   04/24/2017 0.8 0.0 - 2.0 % Final     Immature Grans %   Date Value Ref Range Status   04/24/2017 0.1 0.0 - 0.5 % Final     Neutrophils, Absolute   Date Value Ref Range Status   04/24/2017 3.91 2.00 - 8.60 10*3/mm3 Final     Lymphocytes, Absolute   Date Value Ref Range Status   04/24/2017 2.19 0.60 - 4.20 10*3/mm3 Final     Monocytes, Absolute   Date Value Ref Range Status   04/24/2017 0.65 0.00 - 0.90 10*3/mm3 Final     Eosinophils, Absolute   Date Value Ref Range Status   04/24/2017 0.60 0.00 - 0.70 10*3/mm3 Final     Basophils, Absolute   Date Value Ref Range Status   04/24/2017 0.06 0.00 - 0.20 10*3/mm3 Final     Immature Grans, Absolute   Date Value Ref Range Status   04/24/2017 0.01 0.00 - 0.02 10*3/mm3 Final     nRBC   Date Value Ref Range Status   04/24/2017 0.0 0.0 - 0.0 /100 WBC Final           A/P:  1. Left foot osteomyelitis, s/p Transmetatarsal amputation, POD 4  2. Left foot ulcer, sp full thickness skin graft.  3. History of right TMA    Dressing changed. PRAFO applied, to be worn at all times. May heel touch weightbear to right foot. Patient to be discharged to nursing facility tomorrow for 10 days continued antibiotics. OK for d/c from my perspective. Reinforce dressing as needed. Will follow-up with me following d/c from nursing facility.    Please call with questions.          This document has been electronically signed by Estuardo Fried DPM on April 24, 2017 7:59 AM

## 2017-04-24 NOTE — PLAN OF CARE
Problem: Patient Care Overview (Adult)  Goal: Plan of Care Review  Outcome: Ongoing (interventions implemented as appropriate)    04/24/17 1416   Coping/Psychosocial Response Interventions   Plan Of Care Reviewed With patient   Patient Care Overview   Progress no change   Outcome Evaluation   Outcome Summary/Follow up Plan VSS. pt improving. vanc trough high. adjusted per pharmacy       Goal: Adult Individualization and Mutuality  Outcome: Ongoing (interventions implemented as appropriate)    04/24/17 1416   Individualization   Patient Specific Goals patient is ready to be discharged       Goal: Discharge Needs Assessment  Outcome: Ongoing (interventions implemented as appropriate)    04/24/17 1416   Discharge Needs Assessment   Concerns To Be Addressed denies needs/concerns at this time         Problem: Infection, Risk/Actual (Adult)  Goal: Infection Prevention/Resolution  Outcome: Ongoing (interventions implemented as appropriate)    04/24/17 1416   Infection, Risk/Actual (Adult)   Infection Prevention/Resolution making progress toward outcome

## 2017-04-24 NOTE — PLAN OF CARE
Problem: Patient Care Overview (Adult)  Goal: Plan of Care Review  Outcome: Ongoing (interventions implemented as appropriate)    04/24/17 0533   Coping/Psychosocial Response Interventions   Plan Of Care Reviewed With patient       Goal: Adult Individualization and Mutuality  Outcome: Ongoing (interventions implemented as appropriate)  Goal: Discharge Needs Assessment  Outcome: Ongoing (interventions implemented as appropriate)    04/24/17 0533   Discharge Needs Assessment   Concerns To Be Addressed discharge planning concerns         Problem: Infection, Risk/Actual (Adult)  Goal: Infection Prevention/Resolution  Outcome: Ongoing (interventions implemented as appropriate)    04/24/17 0533   Infection, Risk/Actual (Adult)   Infection Prevention/Resolution making progress toward outcome

## 2017-04-24 NOTE — PROGRESS NOTES
"Pharmacokinetics by Pharmacy - Vancomycin    Damian Wild is a 56 y.o. male  [Ht: 70\" (177.8 cm); Wt: 213 lb 3.2 oz (96.7 kg)]    Estimated Creatinine Clearance: 130.5 mL/min (by C-G formula based on Cr of 0.72).   Lab Results   Component Value Date    CREATININE 0.72 04/24/2017    CREATININE 0.71 04/23/2017    CREATININE 0.87 04/22/2017      Lab Results   Component Value Date    WBC 7.42 04/24/2017    WBC 7.78 04/23/2017    WBC 8.07 04/22/2017      Temp Readings from Last 1 Encounters:   04/24/17 97 °F (36.1 °C) (Axillary)      Lab Results   Component Value Date    VANCOTROUGH 23.67 (H) 04/24/2017         Culture Results:  Microbiology Results (last 10 days)       Procedure Component Value - Date/Time      Wound Culture [[id number]]  (Abnormal)  (Susceptibility) Collected:  04/17/17 1753    Lab Status:  Final result Specimen:  Wound from Foot, Left Updated:  04/19/17 1030     Wound Culture Light growth (2+) Escherichia coli (A)     Beta Lactamase --     Gram Stain Result Rare (1+) WBCs seen      Rare (1+) Gram positive cocci in pairs    Narrative:       Slide reviewed confirmed    Susceptibility        Escherichia coli     JAUN     Amikacin <=16 ug/ml Susceptible     Ampicillin >16 ug/ml Resistant     Ampicillin + Sulbactam <=8/4 ug/ml Susceptible     Cefazolin <=8 ug/ml Susceptible     Cefoxitin <=8 ug/ml Susceptible     Ceftazidime <=1 ug/ml Susceptible     Ceftriaxone <=8 ug/ml Susceptible     Ciprofloxacin <=1 ug/ml Susceptible     Gentamicin <=4 ug/ml Susceptible     Levofloxacin <=2 ug/ml Susceptible     Piperacillin + Tazobactam <=16 ug/ml Susceptible     Tobramycin <=4 ug/ml Susceptible     Trimethoprim + Sulfamethoxazole <=2/38 ug/ml Susceptible                      Blood Culture [[id number]]  (Abnormal) Collected:  04/17/17 1741    Lab Status:  Final result Specimen:  Blood from Arm, Left Updated:  04/19/17 0719     Blood Culture Abnormal Stain (A)      Staphylococcus, coagulase negative (A)      " Consistent with contamination at time of collection.          Gram Stain Result Aerobic Bottle Gram positive cocci in clusters    Blood Culture ID, PCR [[id number]]  (Abnormal) Collected:  04/17/17 1741    Lab Status:  Final result Specimen:  Blood from Arm, Left Updated:  04/18/17 1802     BCID, PCR Staphylococcus spp, not aureus. Identification by BCID PCR. (C)    Narrative:         Sensitivity to Follow    Blood Culture [[id number]]  (Abnormal)  (Susceptibility) Collected:  04/17/17 1731    Lab Status:  Final result Specimen:  Blood from Arm, Right Updated:  04/22/17 1021     Blood Culture Staphylococcus epidermidis (A)     Isolated from Anaerobic Bottle     Gram Stain Result Anaerobic Bottle Gram positive cocci in clusters      two of four; 04/19/2017 0541 alp       Susceptibility        Staphylococcus epidermidis     JAUN     Erythromycin >4 ug/ml Resistant     Gentamicin <=4 ug/ml Susceptible     Oxacillin <=0.25 ug/ml Susceptible     Penicillin G 2 ug/ml Resistant     Rifampin <=1 ug/ml Susceptible     Trimethoprim + Sulfamethoxazole <=0.5/9.5 ug/ml Susceptible     Vancomycin 2 ug/ml Susceptible                                 Indication for use: Osteo s/p amputation    Current Vancomycin Dose:  1750 mg IVPB every 18 hours, day 8 of therapy.      Assessment/Plan:  Trough slightly elevated (goal 15-20).  Trough level drawn about 2 hours early so true trough is probably closer to 20.  Will increase interval to every 18 hours. Pharmacy will continue to monitor renal function and adjust dose accordingly.    Dany Ramos III, [facility]   04/24/17 9:00 AM

## 2017-04-25 ENCOUNTER — HOSPITAL ENCOUNTER (OUTPATIENT)
Facility: HOSPITAL | Age: 57
Discharge: HOME HEALTH | End: 2017-05-01
Attending: INTERNAL MEDICINE | Admitting: INTERNAL MEDICINE

## 2017-04-25 VITALS
WEIGHT: 213.2 LBS | BODY MASS INDEX: 30.52 KG/M2 | SYSTOLIC BLOOD PRESSURE: 141 MMHG | TEMPERATURE: 96.9 F | RESPIRATION RATE: 16 BRPM | DIASTOLIC BLOOD PRESSURE: 80 MMHG | HEART RATE: 56 BPM | HEIGHT: 70 IN | OXYGEN SATURATION: 94 %

## 2017-04-25 DIAGNOSIS — Z78.9 IMPAIRED MOBILITY AND ADLS: ICD-10-CM

## 2017-04-25 DIAGNOSIS — Z74.09 IMPAIRED MOBILITY AND ADLS: ICD-10-CM

## 2017-04-25 DIAGNOSIS — R26.89 IMPAIRED GAIT AND MOBILITY: ICD-10-CM

## 2017-04-25 LAB
ALBUMIN SERPL-MCNC: 3.2 G/DL (ref 3.4–4.8)
ALBUMIN/GLOB SERPL: 0.6 G/DL (ref 1.1–1.8)
ALP SERPL-CCNC: 138 U/L (ref 38–126)
ALT SERPL W P-5'-P-CCNC: 49 U/L (ref 21–72)
ANION GAP SERPL CALCULATED.3IONS-SCNC: 10 MMOL/L (ref 5–15)
ANION GAP SERPL CALCULATED.3IONS-SCNC: 11 MMOL/L (ref 5–15)
AST SERPL-CCNC: 83 U/L (ref 17–59)
BASOPHILS # BLD AUTO: 0.04 10*3/MM3 (ref 0–0.2)
BASOPHILS # BLD AUTO: 0.05 10*3/MM3 (ref 0–0.2)
BASOPHILS NFR BLD AUTO: 0.7 % (ref 0–2)
BASOPHILS NFR BLD AUTO: 0.8 % (ref 0–2)
BILIRUB SERPL-MCNC: 0.9 MG/DL (ref 0.2–1.3)
BUN BLD-MCNC: 11 MG/DL (ref 7–21)
BUN BLD-MCNC: 11 MG/DL (ref 7–21)
BUN/CREAT SERPL: 15.5 (ref 7–25)
BUN/CREAT SERPL: 15.5 (ref 7–25)
CALCIUM SPEC-SCNC: 8.7 MG/DL (ref 8.4–10.2)
CALCIUM SPEC-SCNC: 8.9 MG/DL (ref 8.4–10.2)
CHLORIDE SERPL-SCNC: 100 MMOL/L (ref 95–110)
CHLORIDE SERPL-SCNC: 103 MMOL/L (ref 95–110)
CO2 SERPL-SCNC: 26 MMOL/L (ref 22–31)
CO2 SERPL-SCNC: 27 MMOL/L (ref 22–31)
CREAT BLD-MCNC: 0.71 MG/DL (ref 0.7–1.3)
CREAT BLD-MCNC: 0.71 MG/DL (ref 0.7–1.3)
DEPRECATED RDW RBC AUTO: 42.4 FL (ref 35.1–43.9)
DEPRECATED RDW RBC AUTO: 43.6 FL (ref 35.1–43.9)
EOSINOPHIL # BLD AUTO: 0.35 10*3/MM3 (ref 0–0.7)
EOSINOPHIL # BLD AUTO: 0.51 10*3/MM3 (ref 0–0.7)
EOSINOPHIL NFR BLD AUTO: 6 % (ref 0–7)
EOSINOPHIL NFR BLD AUTO: 8.3 % (ref 0–7)
ERYTHROCYTE [DISTWIDTH] IN BLOOD BY AUTOMATED COUNT: 12.6 % (ref 11.5–14.5)
ERYTHROCYTE [DISTWIDTH] IN BLOOD BY AUTOMATED COUNT: 12.9 % (ref 11.5–14.5)
GFR SERPL CREATININE-BSD FRML MDRD: 114 ML/MIN/1.73 (ref 56–130)
GFR SERPL CREATININE-BSD FRML MDRD: 114 ML/MIN/1.73 (ref 60–130)
GLOBULIN UR ELPH-MCNC: 5.1 GM/DL (ref 2.3–3.5)
GLUCOSE BLD-MCNC: 106 MG/DL (ref 60–100)
GLUCOSE BLD-MCNC: 107 MG/DL (ref 60–100)
HCT VFR BLD AUTO: 36.7 % (ref 39–49)
HCT VFR BLD AUTO: 38.7 % (ref 39–49)
HGB BLD-MCNC: 12.8 G/DL (ref 13.7–17.3)
HGB BLD-MCNC: 13.5 G/DL (ref 13.7–17.3)
HOLD SPECIMEN: NORMAL
IMM GRANULOCYTES # BLD: 0.01 10*3/MM3 (ref 0–0.02)
IMM GRANULOCYTES # BLD: 0.01 10*3/MM3 (ref 0–0.02)
IMM GRANULOCYTES NFR BLD: 0.2 % (ref 0–0.5)
IMM GRANULOCYTES NFR BLD: 0.2 % (ref 0–0.5)
LYMPHOCYTES # BLD AUTO: 1.77 10*3/MM3 (ref 0.6–4.2)
LYMPHOCYTES # BLD AUTO: 2.05 10*3/MM3 (ref 0.6–4.2)
LYMPHOCYTES NFR BLD AUTO: 30.4 % (ref 10–50)
LYMPHOCYTES NFR BLD AUTO: 33.2 % (ref 10–50)
MCH RBC QN AUTO: 32.4 PG (ref 26.5–34)
MCH RBC QN AUTO: 32.8 PG (ref 26.5–34)
MCHC RBC AUTO-ENTMCNC: 34.9 G/DL (ref 31.5–36.3)
MCHC RBC AUTO-ENTMCNC: 34.9 G/DL (ref 31.5–36.3)
MCV RBC AUTO: 92.9 FL (ref 80–98)
MCV RBC AUTO: 93.9 FL (ref 80–98)
MONOCYTES # BLD AUTO: 0.52 10*3/MM3 (ref 0–0.9)
MONOCYTES # BLD AUTO: 0.58 10*3/MM3 (ref 0–0.9)
MONOCYTES NFR BLD AUTO: 8.4 % (ref 0–12)
MONOCYTES NFR BLD AUTO: 9.9 % (ref 0–12)
NEUTROPHILS # BLD AUTO: 3.04 10*3/MM3 (ref 2–8.6)
NEUTROPHILS # BLD AUTO: 3.08 10*3/MM3 (ref 2–8.6)
NEUTROPHILS NFR BLD AUTO: 49.1 % (ref 37–80)
NEUTROPHILS NFR BLD AUTO: 52.8 % (ref 37–80)
NRBC BLD MANUAL-RTO: 0 /100 WBC (ref 0–0)
NRBC BLD MANUAL-RTO: 0 /100 WBC (ref 0–0)
PLATELET # BLD AUTO: 131 10*3/MM3 (ref 150–450)
PLATELET # BLD AUTO: 150 10*3/MM3 (ref 150–450)
PMV BLD AUTO: 10.3 FL (ref 8–12)
PMV BLD AUTO: 11.3 FL (ref 8–12)
POTASSIUM BLD-SCNC: 3.5 MMOL/L (ref 3.5–5.1)
POTASSIUM BLD-SCNC: 4 MMOL/L (ref 3.5–5.1)
PROT SERPL-MCNC: 8.3 G/DL (ref 6.3–8.6)
RBC # BLD AUTO: 3.95 10*6/MM3 (ref 4.37–5.74)
RBC # BLD AUTO: 4.12 10*6/MM3 (ref 4.37–5.74)
SODIUM BLD-SCNC: 138 MMOL/L (ref 137–145)
SODIUM BLD-SCNC: 139 MMOL/L (ref 137–145)
WBC NRBC COR # BLD: 5.83 10*3/MM3 (ref 3.2–9.8)
WBC NRBC COR # BLD: 6.18 10*3/MM3 (ref 3.2–9.8)

## 2017-04-25 PROCEDURE — 80053 COMPREHEN METABOLIC PANEL: CPT | Performed by: NURSE PRACTITIONER

## 2017-04-25 PROCEDURE — 85025 COMPLETE CBC W/AUTO DIFF WBC: CPT | Performed by: INTERNAL MEDICINE

## 2017-04-25 PROCEDURE — 97530 THERAPEUTIC ACTIVITIES: CPT

## 2017-04-25 PROCEDURE — 25010000002 ALTEPLASE 2 MG RECONSTITUTED SOLUTION: Performed by: INTERNAL MEDICINE

## 2017-04-25 PROCEDURE — 25010000002 CEFTRIAXONE: Performed by: INTERNAL MEDICINE

## 2017-04-25 PROCEDURE — 87040 BLOOD CULTURE FOR BACTERIA: CPT | Performed by: INTERNAL MEDICINE

## 2017-04-25 PROCEDURE — 85025 COMPLETE CBC W/AUTO DIFF WBC: CPT | Performed by: NURSE PRACTITIONER

## 2017-04-25 PROCEDURE — 25010000002 MORPHINE SULFATE (PF) 2 MG/ML SOLUTION: Performed by: INTERNAL MEDICINE

## 2017-04-25 PROCEDURE — 25010000002 ENOXAPARIN PER 10 MG: Performed by: INTERNAL MEDICINE

## 2017-04-25 PROCEDURE — 25010000002 MORPHINE SULFATE (PF) 2 MG/ML SOLUTION: Performed by: FAMILY MEDICINE

## 2017-04-25 PROCEDURE — 97161 PT EVAL LOW COMPLEX 20 MIN: CPT

## 2017-04-25 PROCEDURE — 25010000002 VANCOMYCIN PER 500 MG: Performed by: INTERNAL MEDICINE

## 2017-04-25 PROCEDURE — 97116 GAIT TRAINING THERAPY: CPT

## 2017-04-25 PROCEDURE — 97165 OT EVAL LOW COMPLEX 30 MIN: CPT

## 2017-04-25 RX ORDER — ONDANSETRON 4 MG/1
4 TABLET, FILM COATED ORAL EVERY 6 HOURS PRN
Status: DISCONTINUED | OUTPATIENT
Start: 2017-04-25 | End: 2017-05-01 | Stop reason: HOSPADM

## 2017-04-25 RX ORDER — HYDROCHLOROTHIAZIDE 25 MG/1
25 TABLET ORAL DAILY
Status: DISCONTINUED | OUTPATIENT
Start: 2017-04-26 | End: 2017-05-01 | Stop reason: HOSPADM

## 2017-04-25 RX ORDER — DOCUSATE SODIUM 100 MG/1
100 CAPSULE, LIQUID FILLED ORAL 2 TIMES DAILY
Status: DISCONTINUED | OUTPATIENT
Start: 2017-04-25 | End: 2017-05-01 | Stop reason: HOSPADM

## 2017-04-25 RX ORDER — SODIUM CHLORIDE 0.9 % (FLUSH) 0.9 %
10 SYRINGE (ML) INJECTION EVERY 12 HOURS SCHEDULED
Status: DISCONTINUED | OUTPATIENT
Start: 2017-04-25 | End: 2017-05-01 | Stop reason: HOSPADM

## 2017-04-25 RX ORDER — LISINOPRIL 20 MG/1
20 TABLET ORAL
Status: DISCONTINUED | OUTPATIENT
Start: 2017-04-26 | End: 2017-05-01 | Stop reason: HOSPADM

## 2017-04-25 RX ORDER — PANTOPRAZOLE SODIUM 40 MG/1
40 TABLET, DELAYED RELEASE ORAL
Status: DISCONTINUED | OUTPATIENT
Start: 2017-04-25 | End: 2017-05-01 | Stop reason: HOSPADM

## 2017-04-25 RX ORDER — MORPHINE SULFATE 2 MG/ML
1 INJECTION, SOLUTION INTRAMUSCULAR; INTRAVENOUS EVERY 4 HOURS PRN
Status: DISCONTINUED | OUTPATIENT
Start: 2017-04-25 | End: 2017-04-26

## 2017-04-25 RX ORDER — HYDROCODONE BITARTRATE AND ACETAMINOPHEN 7.5; 325 MG/1; MG/1
1 TABLET ORAL EVERY 6 HOURS PRN
Status: DISCONTINUED | OUTPATIENT
Start: 2017-04-25 | End: 2017-05-01 | Stop reason: HOSPADM

## 2017-04-25 RX ORDER — SODIUM CHLORIDE 0.9 % (FLUSH) 0.9 %
10 SYRINGE (ML) INJECTION AS NEEDED
Status: DISCONTINUED | OUTPATIENT
Start: 2017-04-25 | End: 2017-05-01 | Stop reason: HOSPADM

## 2017-04-25 RX ADMIN — SODIUM CHLORIDE, PRESERVATIVE FREE 10 ML: 5 INJECTION INTRAVENOUS at 09:00

## 2017-04-25 RX ADMIN — DOCUSATE SODIUM 100 MG: 100 CAPSULE, LIQUID FILLED ORAL at 08:59

## 2017-04-25 RX ADMIN — MORPHINE SULFATE 2 MG: 2 INJECTION, SOLUTION INTRAMUSCULAR; INTRAVENOUS at 05:05

## 2017-04-25 RX ADMIN — VANCOMYCIN HYDROCHLORIDE 1750 MG: 5 INJECTION, POWDER, LYOPHILIZED, FOR SOLUTION INTRAVENOUS at 09:04

## 2017-04-25 RX ADMIN — LISINOPRIL 20 MG: 20 TABLET ORAL at 08:59

## 2017-04-25 RX ADMIN — HYDROCHLOROTHIAZIDE 25 MG: 25 TABLET ORAL at 08:59

## 2017-04-25 RX ADMIN — HYDROCODONE BITARTRATE AND ACETAMINOPHEN 1 TABLET: 7.5; 325 TABLET ORAL at 09:00

## 2017-04-25 RX ADMIN — HYDROCODONE BITARTRATE AND ACETAMINOPHEN 1 TABLET: 7.5; 325 TABLET ORAL at 01:53

## 2017-04-25 NOTE — PROGRESS NOTES
"Pharmacokinetics by Pharmacy - Vancomycin    Damian Wild is a 56 y.o. male  [Ht: 70\" (177.8 cm); Wt: 213 lb 3.2 oz (96.7 kg)]    Estimated Creatinine Clearance: 132.3 mL/min (by C-G formula based on Cr of 0.71).   Lab Results   Component Value Date    CREATININE 0.71 04/25/2017    CREATININE 0.72 04/24/2017    CREATININE 0.71 04/23/2017      Lab Results   Component Value Date    WBC 6.18 04/25/2017    WBC 7.42 04/24/2017    WBC 7.78 04/23/2017      Temp Readings from Last 1 Encounters:   04/25/17 96.9 °F (36.1 °C) (Oral)      Lab Results   Component Value Date    VANCOTROUGH 23.67 (H) 04/24/2017         Culture Results:  Microbiology Results (last 10 days)       Procedure Component Value - Date/Time      Wound Culture [[id number]]  (Abnormal)  (Susceptibility) Collected:  04/17/17 1753    Lab Status:  Final result Specimen:  Wound from Foot, Left Updated:  04/19/17 1030     Wound Culture Light growth (2+) Escherichia coli (A)     Beta Lactamase --     Gram Stain Result Rare (1+) WBCs seen      Rare (1+) Gram positive cocci in pairs    Narrative:       Slide reviewed confirmed    Susceptibility        Escherichia coli     JAUN     Amikacin <=16 ug/ml Susceptible     Ampicillin >16 ug/ml Resistant     Ampicillin + Sulbactam <=8/4 ug/ml Susceptible     Cefazolin <=8 ug/ml Susceptible     Cefoxitin <=8 ug/ml Susceptible     Ceftazidime <=1 ug/ml Susceptible     Ceftriaxone <=8 ug/ml Susceptible     Ciprofloxacin <=1 ug/ml Susceptible     Gentamicin <=4 ug/ml Susceptible     Levofloxacin <=2 ug/ml Susceptible     Piperacillin + Tazobactam <=16 ug/ml Susceptible     Tobramycin <=4 ug/ml Susceptible     Trimethoprim + Sulfamethoxazole <=2/38 ug/ml Susceptible                      Blood Culture [[id number]]  (Abnormal) Collected:  04/17/17 1741    Lab Status:  Final result Specimen:  Blood from Arm, Left Updated:  04/19/17 0719     Blood Culture Abnormal Stain (A)      Staphylococcus, coagulase negative (A)      " Consistent with contamination at time of collection.          Gram Stain Result Aerobic Bottle Gram positive cocci in clusters    Blood Culture ID, PCR [[id number]]  (Abnormal) Collected:  04/17/17 1741    Lab Status:  Final result Specimen:  Blood from Arm, Left Updated:  04/18/17 1802     BCID, PCR Staphylococcus spp, not aureus. Identification by BCID PCR. (C)    Narrative:         Sensitivity to Follow    Blood Culture [[id number]]  (Abnormal)  (Susceptibility) Collected:  04/17/17 1731    Lab Status:  Final result Specimen:  Blood from Arm, Right Updated:  04/22/17 1021     Blood Culture Staphylococcus epidermidis (A)     Isolated from Anaerobic Bottle     Gram Stain Result Anaerobic Bottle Gram positive cocci in clusters      two of four; 04/19/2017 0541 alp       Susceptibility        Staphylococcus epidermidis     JAUN     Erythromycin >4 ug/ml Resistant     Gentamicin <=4 ug/ml Susceptible     Oxacillin <=0.25 ug/ml Susceptible     Penicillin G 2 ug/ml Resistant     Rifampin <=1 ug/ml Susceptible     Trimethoprim + Sulfamethoxazole <=0.5/9.5 ug/ml Susceptible     Vancomycin 2 ug/ml Susceptible                                 Indication for use: Osteo s/p amputation    Current Vancomycin Dose:  1750 mg IVPB every 18 hours, day 9 of therapy.      Assessment/Plan:  Trough tomorrow. Pharmacy will continue to monitor renal function and adjust dose accordingly.    Dany Ramos III [facility]   04/25/17 9:17 AM

## 2017-04-25 NOTE — PLAN OF CARE
Problem: Patient Care Overview (Adult)  Goal: Plan of Care Review  Outcome: Ongoing (interventions implemented as appropriate)    04/25/17 0408   Coping/Psychosocial Response Interventions   Plan Of Care Reviewed With patient   Patient Care Overview   Progress no change   Outcome Evaluation   Outcome Summary/Follow up Plan VSS, pain is controlled       Goal: Adult Individualization and Mutuality  Outcome: Ongoing (interventions implemented as appropriate)  Goal: Discharge Needs Assessment  Outcome: Ongoing (interventions implemented as appropriate)  Goal: Plan of Care Review  Outcome: Ongoing (interventions implemented as appropriate)  Goal: Adult Individualization and Mutuality  Outcome: Ongoing (interventions implemented as appropriate)  Goal: Discharge Needs Assessment  Outcome: Ongoing (interventions implemented as appropriate)    Problem: Sepsis (Adult)  Goal: Signs and Symptoms of Listed Potential Problems Will be Absent or Manageable (Sepsis)  Outcome: Ongoing (interventions implemented as appropriate)    Problem: Pain, Acute (Adult)  Goal: Identify Related Risk Factors and Signs and Symptoms  Outcome: Ongoing (interventions implemented as appropriate)    Problem: Infection, Risk/Actual (Adult)  Goal: Identify Related Risk Factors and Signs and Symptoms  Outcome: Ongoing (interventions implemented as appropriate)

## 2017-04-26 ENCOUNTER — APPOINTMENT (OUTPATIENT)
Dept: GENERAL RADIOLOGY | Facility: HOSPITAL | Age: 57
End: 2017-04-26

## 2017-04-26 LAB — VANCOMYCIN TROUGH SERPL-MCNC: 14.9 MCG/ML (ref 10–15)

## 2017-04-26 PROCEDURE — 80202 ASSAY OF VANCOMYCIN: CPT | Performed by: INTERNAL MEDICINE

## 2017-04-26 PROCEDURE — 25010000002 HYDROMORPHONE PER 4 MG: Performed by: INTERNAL MEDICINE

## 2017-04-26 PROCEDURE — 25010000002 VANCOMYCIN PER 500 MG: Performed by: INTERNAL MEDICINE

## 2017-04-26 PROCEDURE — 97530 THERAPEUTIC ACTIVITIES: CPT

## 2017-04-26 PROCEDURE — 97535 SELF CARE MNGMENT TRAINING: CPT

## 2017-04-26 PROCEDURE — 25010000002 CEFTRIAXONE: Performed by: INTERNAL MEDICINE

## 2017-04-26 PROCEDURE — 71010 HC CHEST PA OR AP: CPT

## 2017-04-26 PROCEDURE — 25010000002 MORPHINE SULFATE (PF) 2 MG/ML SOLUTION: Performed by: INTERNAL MEDICINE

## 2017-04-26 PROCEDURE — 25010000002 ENOXAPARIN PER 10 MG: Performed by: INTERNAL MEDICINE

## 2017-04-26 PROCEDURE — 97116 GAIT TRAINING THERAPY: CPT

## 2017-04-27 LAB
ALBUMIN SERPL-MCNC: 3 G/DL (ref 3.4–4.8)
ALBUMIN/GLOB SERPL: 0.6 G/DL (ref 1.1–1.8)
ALP SERPL-CCNC: 124 U/L (ref 38–126)
ALT SERPL W P-5'-P-CCNC: 44 U/L (ref 21–72)
ANION GAP SERPL CALCULATED.3IONS-SCNC: 10 MMOL/L (ref 5–15)
AST SERPL-CCNC: 70 U/L (ref 17–59)
BILIRUB SERPL-MCNC: 1 MG/DL (ref 0.2–1.3)
BUN BLD-MCNC: 12 MG/DL (ref 7–21)
BUN/CREAT SERPL: 15.2 (ref 7–25)
CALCIUM SPEC-SCNC: 8.7 MG/DL (ref 8.4–10.2)
CHLORIDE SERPL-SCNC: 102 MMOL/L (ref 95–110)
CO2 SERPL-SCNC: 28 MMOL/L (ref 22–31)
CREAT BLD-MCNC: 0.79 MG/DL (ref 0.7–1.3)
DEPRECATED RDW RBC AUTO: 42.8 FL (ref 35.1–43.9)
ERYTHROCYTE [DISTWIDTH] IN BLOOD BY AUTOMATED COUNT: 12.6 % (ref 11.5–14.5)
GFR SERPL CREATININE-BSD FRML MDRD: 101 ML/MIN/1.73 (ref 60–130)
GLOBULIN UR ELPH-MCNC: 4.7 GM/DL (ref 2.3–3.5)
GLUCOSE BLD-MCNC: 96 MG/DL (ref 60–100)
HCT VFR BLD AUTO: 34.3 % (ref 39–49)
HGB BLD-MCNC: 12.1 G/DL (ref 13.7–17.3)
MAGNESIUM SERPL-MCNC: 1.6 MG/DL (ref 1.6–2.3)
MCH RBC QN AUTO: 32.8 PG (ref 26.5–34)
MCHC RBC AUTO-ENTMCNC: 35.3 G/DL (ref 31.5–36.3)
MCV RBC AUTO: 93 FL (ref 80–98)
PLATELET # BLD AUTO: 115 10*3/MM3 (ref 150–450)
PMV BLD AUTO: 10.5 FL (ref 8–12)
POTASSIUM BLD-SCNC: 3.5 MMOL/L (ref 3.5–5.1)
PROT SERPL-MCNC: 7.7 G/DL (ref 6.3–8.6)
RBC # BLD AUTO: 3.69 10*6/MM3 (ref 4.37–5.74)
SODIUM BLD-SCNC: 140 MMOL/L (ref 137–145)
WBC NRBC COR # BLD: 5.87 10*3/MM3 (ref 3.2–9.8)

## 2017-04-27 PROCEDURE — 25010000002 HYDROMORPHONE PER 4 MG: Performed by: INTERNAL MEDICINE

## 2017-04-27 PROCEDURE — 25010000002 CEFTRIAXONE: Performed by: INTERNAL MEDICINE

## 2017-04-27 PROCEDURE — 83735 ASSAY OF MAGNESIUM: CPT | Performed by: INTERNAL MEDICINE

## 2017-04-27 PROCEDURE — 25010000002 VANCOMYCIN PER 500 MG: Performed by: INTERNAL MEDICINE

## 2017-04-27 PROCEDURE — 97535 SELF CARE MNGMENT TRAINING: CPT

## 2017-04-27 PROCEDURE — 85027 COMPLETE CBC AUTOMATED: CPT | Performed by: INTERNAL MEDICINE

## 2017-04-27 PROCEDURE — 25010000002 ENOXAPARIN PER 10 MG: Performed by: INTERNAL MEDICINE

## 2017-04-27 PROCEDURE — 97110 THERAPEUTIC EXERCISES: CPT

## 2017-04-27 PROCEDURE — 97116 GAIT TRAINING THERAPY: CPT

## 2017-04-27 PROCEDURE — 80053 COMPREHEN METABOLIC PANEL: CPT | Performed by: INTERNAL MEDICINE

## 2017-04-27 PROCEDURE — 97530 THERAPEUTIC ACTIVITIES: CPT

## 2017-04-28 PROCEDURE — 97116 GAIT TRAINING THERAPY: CPT

## 2017-04-28 PROCEDURE — 97530 THERAPEUTIC ACTIVITIES: CPT

## 2017-04-28 PROCEDURE — 25010000002 HYDROMORPHONE PER 4 MG: Performed by: INTERNAL MEDICINE

## 2017-04-28 PROCEDURE — 25010000002 VANCOMYCIN PER 500 MG: Performed by: INTERNAL MEDICINE

## 2017-04-28 PROCEDURE — 97535 SELF CARE MNGMENT TRAINING: CPT

## 2017-04-28 PROCEDURE — 25010000002 CEFTRIAXONE: Performed by: INTERNAL MEDICINE

## 2017-04-28 PROCEDURE — 25010000002 ENOXAPARIN PER 10 MG: Performed by: INTERNAL MEDICINE

## 2017-04-28 RX ORDER — SODIUM CHLORIDE 9 MG/ML
INJECTION, SOLUTION INTRAVENOUS
Status: DISPENSED
Start: 2017-04-28 | End: 2017-04-28

## 2017-04-28 RX ORDER — SODIUM CHLORIDE 9 MG/ML
INJECTION, SOLUTION INTRAVENOUS
Status: DISPENSED
Start: 2017-04-28 | End: 2017-04-29

## 2017-04-29 LAB
ALBUMIN SERPL-MCNC: 3.3 G/DL (ref 3.4–4.8)
ALBUMIN/GLOB SERPL: 0.6 G/DL (ref 1.1–1.8)
ALP SERPL-CCNC: 137 U/L (ref 38–126)
ALT SERPL W P-5'-P-CCNC: 52 U/L (ref 21–72)
ANION GAP SERPL CALCULATED.3IONS-SCNC: 10 MMOL/L (ref 5–15)
AST SERPL-CCNC: 80 U/L (ref 17–59)
BILIRUB SERPL-MCNC: 1.1 MG/DL (ref 0.2–1.3)
BUN BLD-MCNC: 13 MG/DL (ref 7–21)
BUN/CREAT SERPL: 16 (ref 7–25)
CALCIUM SPEC-SCNC: 8.9 MG/DL (ref 8.4–10.2)
CHLORIDE SERPL-SCNC: 103 MMOL/L (ref 95–110)
CO2 SERPL-SCNC: 26 MMOL/L (ref 22–31)
CREAT BLD-MCNC: 0.81 MG/DL (ref 0.7–1.3)
DEPRECATED RDW RBC AUTO: 43.8 FL (ref 35.1–43.9)
ERYTHROCYTE [DISTWIDTH] IN BLOOD BY AUTOMATED COUNT: 12.8 % (ref 11.5–14.5)
GFR SERPL CREATININE-BSD FRML MDRD: 98 ML/MIN/1.73 (ref 60–130)
GLOBULIN UR ELPH-MCNC: 5.1 GM/DL (ref 2.3–3.5)
GLUCOSE BLD-MCNC: 96 MG/DL (ref 60–100)
HCT VFR BLD AUTO: 35.1 % (ref 39–49)
HGB BLD-MCNC: 12.2 G/DL (ref 13.7–17.3)
MAGNESIUM SERPL-MCNC: 1.6 MG/DL (ref 1.6–2.3)
MCH RBC QN AUTO: 32.5 PG (ref 26.5–34)
MCHC RBC AUTO-ENTMCNC: 34.8 G/DL (ref 31.5–36.3)
MCV RBC AUTO: 93.6 FL (ref 80–98)
PLATELET # BLD AUTO: 115 10*3/MM3 (ref 150–450)
PMV BLD AUTO: 10.4 FL (ref 8–12)
POTASSIUM BLD-SCNC: 3.8 MMOL/L (ref 3.5–5.1)
PROT SERPL-MCNC: 8.4 G/DL (ref 6.3–8.6)
RBC # BLD AUTO: 3.75 10*6/MM3 (ref 4.37–5.74)
SODIUM BLD-SCNC: 139 MMOL/L (ref 137–145)
WBC NRBC COR # BLD: 6.42 10*3/MM3 (ref 3.2–9.8)

## 2017-04-29 PROCEDURE — 25010000002 CEFTRIAXONE: Performed by: INTERNAL MEDICINE

## 2017-04-29 PROCEDURE — 25010000002 ENOXAPARIN PER 10 MG: Performed by: INTERNAL MEDICINE

## 2017-04-29 PROCEDURE — 83735 ASSAY OF MAGNESIUM: CPT | Performed by: INTERNAL MEDICINE

## 2017-04-29 PROCEDURE — 25010000002 HYDROMORPHONE PER 4 MG: Performed by: INTERNAL MEDICINE

## 2017-04-29 PROCEDURE — 25010000002 VANCOMYCIN PER 500 MG: Performed by: INTERNAL MEDICINE

## 2017-04-29 PROCEDURE — 80053 COMPREHEN METABOLIC PANEL: CPT | Performed by: INTERNAL MEDICINE

## 2017-04-29 PROCEDURE — 85027 COMPLETE CBC AUTOMATED: CPT | Performed by: INTERNAL MEDICINE

## 2017-04-29 RX ORDER — SODIUM CHLORIDE 9 MG/ML
INJECTION, SOLUTION INTRAVENOUS
Status: DISPENSED
Start: 2017-04-29 | End: 2017-04-30

## 2017-04-30 LAB
BACTERIA SPEC AEROBE CULT: NORMAL
BACTERIA SPEC AEROBE CULT: NORMAL

## 2017-04-30 PROCEDURE — 25010000002 VANCOMYCIN PER 500 MG: Performed by: INTERNAL MEDICINE

## 2017-04-30 PROCEDURE — 97530 THERAPEUTIC ACTIVITIES: CPT

## 2017-04-30 PROCEDURE — 97116 GAIT TRAINING THERAPY: CPT

## 2017-04-30 PROCEDURE — 25010000002 CEFTRIAXONE: Performed by: INTERNAL MEDICINE

## 2017-04-30 PROCEDURE — 25010000002 HYDROMORPHONE PER 4 MG: Performed by: INTERNAL MEDICINE

## 2017-04-30 PROCEDURE — 25010000002 ENOXAPARIN PER 10 MG: Performed by: INTERNAL MEDICINE

## 2017-05-01 LAB
ALBUMIN SERPL-MCNC: 3.2 G/DL (ref 3.4–4.8)
ALBUMIN/GLOB SERPL: 0.7 G/DL (ref 1.1–1.8)
ALP SERPL-CCNC: 139 U/L (ref 38–126)
ALT SERPL W P-5'-P-CCNC: 53 U/L (ref 21–72)
ANION GAP SERPL CALCULATED.3IONS-SCNC: 12 MMOL/L (ref 5–15)
AST SERPL-CCNC: 77 U/L (ref 17–59)
BILIRUB SERPL-MCNC: 0.9 MG/DL (ref 0.2–1.3)
BUN BLD-MCNC: 11 MG/DL (ref 7–21)
BUN/CREAT SERPL: 16.4 (ref 7–25)
CALCIUM SPEC-SCNC: 8.5 MG/DL (ref 8.4–10.2)
CHLORIDE SERPL-SCNC: 105 MMOL/L (ref 95–110)
CO2 SERPL-SCNC: 24 MMOL/L (ref 22–31)
CREAT BLD-MCNC: 0.67 MG/DL (ref 0.7–1.3)
DEPRECATED RDW RBC AUTO: 43 FL (ref 35.1–43.9)
ERYTHROCYTE [DISTWIDTH] IN BLOOD BY AUTOMATED COUNT: 12.6 % (ref 11.5–14.5)
GFR SERPL CREATININE-BSD FRML MDRD: 122 ML/MIN/1.73 (ref 56–130)
GLOBULIN UR ELPH-MCNC: 4.8 GM/DL (ref 2.3–3.5)
GLUCOSE BLD-MCNC: 90 MG/DL (ref 60–100)
HCT VFR BLD AUTO: 33.7 % (ref 39–49)
HGB BLD-MCNC: 11.9 G/DL (ref 13.7–17.3)
MAGNESIUM SERPL-MCNC: 1.5 MG/DL (ref 1.6–2.3)
MCH RBC QN AUTO: 32.9 PG (ref 26.5–34)
MCHC RBC AUTO-ENTMCNC: 35.3 G/DL (ref 31.5–36.3)
MCV RBC AUTO: 93.1 FL (ref 80–98)
PLATELET # BLD AUTO: 99 10*3/MM3 (ref 150–450)
PMV BLD AUTO: 11 FL (ref 8–12)
POTASSIUM BLD-SCNC: 3.3 MMOL/L (ref 3.5–5.1)
PROT SERPL-MCNC: 8 G/DL (ref 6.3–8.6)
RBC # BLD AUTO: 3.62 10*6/MM3 (ref 4.37–5.74)
SODIUM BLD-SCNC: 141 MMOL/L (ref 137–145)
WBC NRBC COR # BLD: 5.65 10*3/MM3 (ref 3.2–9.8)

## 2017-05-01 PROCEDURE — 25010000002 CEFTRIAXONE: Performed by: INTERNAL MEDICINE

## 2017-05-01 PROCEDURE — 85027 COMPLETE CBC AUTOMATED: CPT | Performed by: INTERNAL MEDICINE

## 2017-05-01 PROCEDURE — 25010000002 HYDROMORPHONE PER 4 MG: Performed by: INTERNAL MEDICINE

## 2017-05-01 PROCEDURE — 80053 COMPREHEN METABOLIC PANEL: CPT | Performed by: INTERNAL MEDICINE

## 2017-05-01 PROCEDURE — 25010000002 ENOXAPARIN PER 10 MG: Performed by: INTERNAL MEDICINE

## 2017-05-01 PROCEDURE — 25010000002 VANCOMYCIN PER 500 MG: Performed by: INTERNAL MEDICINE

## 2017-05-01 PROCEDURE — 97116 GAIT TRAINING THERAPY: CPT

## 2017-05-01 PROCEDURE — 83735 ASSAY OF MAGNESIUM: CPT | Performed by: INTERNAL MEDICINE

## 2017-05-01 PROCEDURE — 97535 SELF CARE MNGMENT TRAINING: CPT

## 2017-05-01 PROCEDURE — 97530 THERAPEUTIC ACTIVITIES: CPT

## 2017-05-01 RX ORDER — SODIUM CHLORIDE 9 MG/ML
INJECTION, SOLUTION INTRAVENOUS
Status: DISCONTINUED
Start: 2017-05-01 | End: 2017-05-01 | Stop reason: HOSPADM

## 2017-05-04 ENCOUNTER — OFFICE VISIT (OUTPATIENT)
Dept: PODIATRY | Facility: CLINIC | Age: 57
End: 2017-05-04

## 2017-05-04 VITALS — BODY MASS INDEX: 30.49 KG/M2 | HEIGHT: 70 IN | WEIGHT: 213 LBS

## 2017-05-04 DIAGNOSIS — G62.9 POLYNEUROPATHY: ICD-10-CM

## 2017-05-04 DIAGNOSIS — Z89.432 S/P TRANSMETATARSAL AMPUTATION OF FOOT, LEFT (HCC): Primary | ICD-10-CM

## 2017-05-04 PROCEDURE — 99024 POSTOP FOLLOW-UP VISIT: CPT | Performed by: PODIATRIST

## 2017-05-17 ENCOUNTER — TELEPHONE (OUTPATIENT)
Dept: PODIATRY | Facility: CLINIC | Age: 57
End: 2017-05-17

## 2017-05-18 ENCOUNTER — OFFICE VISIT (OUTPATIENT)
Dept: PODIATRY | Facility: CLINIC | Age: 57
End: 2017-05-18

## 2017-05-18 VITALS — WEIGHT: 213 LBS | HEIGHT: 70 IN | BODY MASS INDEX: 30.49 KG/M2

## 2017-05-18 DIAGNOSIS — G62.9 POLYNEUROPATHY: ICD-10-CM

## 2017-05-18 DIAGNOSIS — Z89.432 S/P TRANSMETATARSAL AMPUTATION OF FOOT, LEFT (HCC): Primary | ICD-10-CM

## 2017-05-18 DIAGNOSIS — IMO0001 VENOUS STASIS ULCER, LEFT: ICD-10-CM

## 2017-05-18 DIAGNOSIS — L97.522 FOOT ULCER, LEFT, WITH FAT LAYER EXPOSED (HCC): ICD-10-CM

## 2017-05-18 PROCEDURE — 29580 STRAPPING UNNA BOOT: CPT | Performed by: PODIATRIST

## 2017-05-18 PROCEDURE — 15275 SKIN SUB GRAFT FACE/NK/HF/G: CPT | Performed by: PODIATRIST

## 2017-05-18 PROCEDURE — 99024 POSTOP FOLLOW-UP VISIT: CPT | Performed by: PODIATRIST

## 2017-05-19 ENCOUNTER — TELEPHONE (OUTPATIENT)
Dept: PODIATRY | Facility: CLINIC | Age: 57
End: 2017-05-19

## 2017-05-25 ENCOUNTER — TELEPHONE (OUTPATIENT)
Dept: PODIATRY | Facility: CLINIC | Age: 57
End: 2017-05-25

## 2017-05-25 ENCOUNTER — OFFICE VISIT (OUTPATIENT)
Dept: PODIATRY | Facility: CLINIC | Age: 57
End: 2017-05-25

## 2017-05-25 VITALS — BODY MASS INDEX: 30.49 KG/M2 | WEIGHT: 213 LBS | HEIGHT: 70 IN

## 2017-05-25 DIAGNOSIS — G62.9 POLYNEUROPATHY: ICD-10-CM

## 2017-05-25 DIAGNOSIS — L97.522 FOOT ULCER, LEFT, WITH FAT LAYER EXPOSED (HCC): Primary | ICD-10-CM

## 2017-05-25 DIAGNOSIS — Z89.432 S/P TRANSMETATARSAL AMPUTATION OF FOOT, LEFT (HCC): ICD-10-CM

## 2017-05-25 PROCEDURE — 99024 POSTOP FOLLOW-UP VISIT: CPT | Performed by: PODIATRIST

## 2017-05-25 PROCEDURE — 29580 STRAPPING UNNA BOOT: CPT | Performed by: PODIATRIST

## 2017-05-25 PROCEDURE — 11042 DBRDMT SUBQ TIS 1ST 20SQCM/<: CPT | Performed by: PODIATRIST

## 2017-05-30 ENCOUNTER — TELEPHONE (OUTPATIENT)
Dept: PODIATRY | Facility: CLINIC | Age: 57
End: 2017-05-30

## 2017-06-01 ENCOUNTER — OFFICE VISIT (OUTPATIENT)
Dept: PODIATRY | Facility: CLINIC | Age: 57
End: 2017-06-01

## 2017-06-01 VITALS — WEIGHT: 213 LBS | BODY MASS INDEX: 30.49 KG/M2 | HEIGHT: 70 IN

## 2017-06-01 DIAGNOSIS — L97.522 FOOT ULCER, LEFT, WITH FAT LAYER EXPOSED (HCC): Primary | ICD-10-CM

## 2017-06-01 DIAGNOSIS — IMO0001 VENOUS STASIS ULCER, LEFT: ICD-10-CM

## 2017-06-01 DIAGNOSIS — Z89.432 S/P TRANSMETATARSAL AMPUTATION OF FOOT, LEFT (HCC): ICD-10-CM

## 2017-06-01 PROCEDURE — 15275 SKIN SUB GRAFT FACE/NK/HF/G: CPT | Performed by: PODIATRIST

## 2017-06-01 NOTE — PROGRESS NOTES
Damian Wild  1960  56 y.o. male   Patient presents today for a pst-op recheck of the left foot.     06/01/2017    Chief Complaint   Patient presents with   • Left Foot - post op rechk           History of Present Illness    Mr Wild is a 57 y/o male who presents s/p left transmetatarsal amputation. DOS 4/19/17. Doing well overall. Tolerated left unna boot well. Venous stasis ulcerations and edema improving.    Past Medical History:   Diagnosis Date   • Hypertension    • Stroke          Past Surgical History:   Procedure Laterality Date   • AMPUTATION DIGIT Left 4/19/2017    Procedure: LEFT FOOT TRANSMETATARSAL AMPUTATION, TENDO ACHILLES LENGTHENING.;  Surgeon: Estuardo Fried DPM;  Location: [facility];  Service:    • TOE AMPUTATION Right     All toes removed on right foot   • TOE AMPUTATION Left     great toe removed   • TONSILLECTOMY           No family history on file.      Social History     Social History   • Marital status:      Spouse name: N/A   • Number of children: N/A   • Years of education: N/A     Occupational History   • Not on file.     Social History Main Topics   • Smoking status: Current Some Day Smoker   • Smokeless tobacco: Not on file      Comment: one cigarette a month   • Alcohol use No   • Drug use: No   • Sexual activity: Defer     Other Topics Concern   • Not on file     Social History Narrative         Current Outpatient Prescriptions   Medication Sig Dispense Refill   • cefTRIAXone (ROCEPHIN) 1 G injection Infuse 2 g into a venous catheter Daily. 20 g 0   • cefTRIAXone (ROCEPHIN) 1 g/100 mL 0.9% NS (MBP) Infuse 100 mL into a venous catheter Daily. Indications: Skin and Soft Tissue Infection 1000 each 0   • lisinopril-hydrochlorothiazide (PRINZIDE,ZESTORETIC) 20-25 MG per tablet   1   • traMADol (ULTRAM) 50 MG tablet Take 1 tablet by mouth Every 6 (Six) Hours As Needed for Moderate Pain (4-6) (pain). Take one to two every 4-6 hours prn pain. 40 tablet 0     No  "current facility-administered medications for this visit.          OBJECTIVE    Ht 70\" (177.8 cm)  Wt 213 lb (96.6 kg)  BMI 30.56 kg/m2      Review of Systems   Constitutional:  Denies recent weight loss, weight gain, fever or chills, no change in exercise tolerance  Musculoskeletal: Foot pain. H/o toe amputations   Skin:  Left foot incision  Neurological:  Burning sensations to feet b/l.  Psychiatric/Behavioral: Denies depression    Physical Exam   Constitutional: he appears well-developed and well-nourished.   HEENT: Normocephalic. Atraumatic.  CV: No CP. RRR  Resp: Non-labored respirations.  Psychiatric: he has a normal mood and affect. his behavior is normal.     Lower Extremity Exam:  Vascular: DP/PT pulses palpable 1+.   Negative hair growth.   2+ bilateral leg edema  Feel cool to touch  Neuro: Protective sensation absent, b/l.  DTRs intact  Integument:   Atrophic skin noted b/l  Weeping partial thickness ulcer to left anterior lower leg. No SOI  Left foot TMA incision coapted with sutures intact. No SOI  Central site of wound excision with 1.6 x 0.5 x 0.3 cm ulceration. No SOI. Fibrogranular base  Left lateral 5th metatarsal base ulceration 2.4 x 0.9 x 0.3 cm.  No SOI  Musculoskeletal: LE muscle strength 5/5.   Gait  normal  B/L TMA    Left foot wound debridement:  Risks and benefits discussed.  Left anterior TMA, lateral midfoot ulcer debrided of surrounding hyperkeratosis and devitalized tissue with 15 blade to level of subq  Pressure hemostasis obtained  Tolerated well        ASSESSMENT AND PLAN    Damian was seen today for post op rechk.    Diagnoses and all orders for this visit:    Foot ulcer, left, with fat layer exposed    S/P transmetatarsal amputation of foot, left    Venous stasis ulcer, left    -Comprehensive foot and ankle exam performed  -Wounds noted to central TMA flap and lateral midfoot as above. Wound debridement as above  -4.5 x 4.0 cm Epifix applied to left foot wounds.  Lower extremity " edema improving and partial-thickness venous stasis ulceration now healed.  -Dry dressing applied.  -WB in surgical shoe only  -Leave dressing intact 1 week  -Recheck 1 week            This document has been electronically signed by Estuardo Fried DPM on June 4, 2017 2:43 PM     Estuarod Fried DPM  6/4/2017  2:43 PM

## 2017-06-08 ENCOUNTER — OFFICE VISIT (OUTPATIENT)
Dept: PODIATRY | Facility: CLINIC | Age: 57
End: 2017-06-08

## 2017-06-08 VITALS — HEIGHT: 70 IN | WEIGHT: 213 LBS | BODY MASS INDEX: 30.49 KG/M2

## 2017-06-08 DIAGNOSIS — IMO0001 VENOUS STASIS ULCER, LEFT: ICD-10-CM

## 2017-06-08 DIAGNOSIS — Z89.432 S/P TRANSMETATARSAL AMPUTATION OF FOOT, LEFT (HCC): ICD-10-CM

## 2017-06-08 DIAGNOSIS — L97.522 FOOT ULCER, LEFT, WITH FAT LAYER EXPOSED (HCC): Primary | ICD-10-CM

## 2017-06-08 PROCEDURE — 11042 DBRDMT SUBQ TIS 1ST 20SQCM/<: CPT | Performed by: PODIATRIST

## 2017-06-08 PROCEDURE — 99024 POSTOP FOLLOW-UP VISIT: CPT | Performed by: PODIATRIST

## 2017-06-08 PROCEDURE — 29580 STRAPPING UNNA BOOT: CPT | Performed by: PODIATRIST

## 2017-06-08 NOTE — PROGRESS NOTES
Damian Wild  1960  56 y.o. male   Patient presents today for a pst-op recheck of the left foot.     06/08/2017    Chief Complaint   Patient presents with   • Left Lower Leg - Follow-up   • Left Foot - post op recheck           History of Present Illness    Mr Wild is a 57 y/o male who presents s/p left transmetatarsal amputation. DOS 4/19/17. Doing well overall. Has experienced reformation of venous stasis ulcerations on left.    Past Medical History:   Diagnosis Date   • Hypertension    • Stroke          Past Surgical History:   Procedure Laterality Date   • AMPUTATION DIGIT Left 4/19/2017    Procedure: LEFT FOOT TRANSMETATARSAL AMPUTATION, TENDO ACHILLES LENGTHENING.;  Surgeon: Estuardo Fried DPM;  Location: [facility];  Service:    • TOE AMPUTATION Right     All toes removed on right foot   • TOE AMPUTATION Left     great toe removed   • TONSILLECTOMY           No family history on file.      Social History     Social History   • Marital status:      Spouse name: N/A   • Number of children: N/A   • Years of education: N/A     Occupational History   • Not on file.     Social History Main Topics   • Smoking status: Current Some Day Smoker   • Smokeless tobacco: Not on file      Comment: one cigarette a month   • Alcohol use No   • Drug use: No   • Sexual activity: Defer     Other Topics Concern   • Not on file     Social History Narrative         Current Outpatient Prescriptions   Medication Sig Dispense Refill   • cefTRIAXone (ROCEPHIN) 1 G injection Infuse 2 g into a venous catheter Daily. 20 g 0   • cefTRIAXone (ROCEPHIN) 1 g/100 mL 0.9% NS (MBP) Infuse 100 mL into a venous catheter Daily. Indications: Skin and Soft Tissue Infection 1000 each 0   • lisinopril-hydrochlorothiazide (PRINZIDE,ZESTORETIC) 20-25 MG per tablet   1   • traMADol (ULTRAM) 50 MG tablet Take 1 tablet by mouth Every 6 (Six) Hours As Needed for Moderate Pain (4-6) (pain). Take one to two every 4-6 hours prn pain. 40  "tablet 0     No current facility-administered medications for this visit.          OBJECTIVE    Ht 70\" (177.8 cm)  Wt 213 lb (96.6 kg)  BMI 30.56 kg/m2      Review of Systems   Constitutional:  Denies recent weight loss, weight gain, fever or chills, no change in exercise tolerance  Musculoskeletal: Foot pain. H/o toe amputations   Skin:  Left foot incision  Neurological:  Burning sensations to feet b/l.  Psychiatric/Behavioral: Denies depression    Physical Exam   Constitutional: he appears well-developed and well-nourished.   HEENT: Normocephalic. Atraumatic.  CV: No CP. RRR  Resp: Non-labored respirations.  Psychiatric: he has a normal mood and affect. his behavior is normal.     Lower Extremity Exam:  Vascular: DP/PT pulses palpable 1+.   Negative hair growth.   2+ bilateral leg edema  Feel cool to touch  Neuro: Protective sensation absent, b/l.  DTRs intact  Integument:   Atrophic skin noted b/l  Weeping partial thickness ulcer to left anterior medial lower leg. No SOI  Left foot TMA incision coapted with sutures intact. No SOI  Central site of wound excision with 1.4 x 0.4 x 0.3 cm ulceration. No SOI. Fibrogranular base  Left lateral 5th metatarsal base ulceration 2.4 x 0.8 x 0.3 cm.  No SOI  Musculoskeletal: LE muscle strength 5/5.   Gait  normal  B/L TMA    Left foot wound debridement:  Risks and benefits discussed.  Left anterior TMA, lateral midfoot ulcer debrided of surrounding hyperkeratosis and devitalized tissue with 15 blade to level of subq  Pressure hemostasis obtained  Tolerated well        ASSESSMENT AND PLAN    Damian was seen today for follow-up and post op recheck.    Diagnoses and all orders for this visit:    Foot ulcer, left, with fat layer exposed    S/P transmetatarsal amputation of foot, left    Venous stasis ulcer, left    -Comprehensive foot and ankle exam performed  -Wounds noted to central TMA flap and lateral midfoot as above. Wound debridement as above  -Dry dressing applied and " Unna boot applied to left LE  -WB in surgical shoe only  -Leave dressing intact 1 week  -Recheck 1 week            This document has been electronically signed by Estuardo Fried DPM on June 13, 2017 7:57 PM     Estuardo Fried DPM  6/13/2017  7:57 PM

## 2017-06-15 ENCOUNTER — OFFICE VISIT (OUTPATIENT)
Dept: PODIATRY | Facility: CLINIC | Age: 57
End: 2017-06-15

## 2017-06-15 VITALS — HEIGHT: 70 IN | WEIGHT: 213 LBS | BODY MASS INDEX: 30.49 KG/M2

## 2017-06-15 DIAGNOSIS — L97.522 FOOT ULCER, LEFT, WITH FAT LAYER EXPOSED (HCC): Primary | ICD-10-CM

## 2017-06-15 DIAGNOSIS — IMO0001 VENOUS STASIS ULCER, LEFT: ICD-10-CM

## 2017-06-15 DIAGNOSIS — Z89.432 S/P TRANSMETATARSAL AMPUTATION OF FOOT, LEFT (HCC): ICD-10-CM

## 2017-06-15 PROCEDURE — 29580 STRAPPING UNNA BOOT: CPT | Performed by: PODIATRIST

## 2017-06-15 PROCEDURE — 15275 SKIN SUB GRAFT FACE/NK/HF/G: CPT | Performed by: PODIATRIST

## 2017-06-15 NOTE — PROGRESS NOTES
Damian Wild  1960  56 y.o. male   Patient presents today for a pst-op recheck of the left foot.     06/15/2017      Chief Complaint   Patient presents with   • Left Foot - Post-op Recheck   • Left Lower Leg - Follow-up           History of Present Illness    Mr Wild is a 57 y/o male who presents s/p left transmetatarsal amputation. DOS 4/19/17. Doing well overall. Left venous stasis ulceration improving.    Past Medical History:   Diagnosis Date   • Hypertension    • Stroke          Past Surgical History:   Procedure Laterality Date   • AMPUTATION DIGIT Left 4/19/2017    Procedure: LEFT FOOT TRANSMETATARSAL AMPUTATION, TENDO ACHILLES LENGTHENING.;  Surgeon: Estuardo Fried DPM;  Location: [facility];  Service:    • TOE AMPUTATION Right     All toes removed on right foot   • TOE AMPUTATION Left     great toe removed   • TONSILLECTOMY           No family history on file.      Social History     Social History   • Marital status:      Spouse name: N/A   • Number of children: N/A   • Years of education: N/A     Occupational History   • Not on file.     Social History Main Topics   • Smoking status: Current Some Day Smoker   • Smokeless tobacco: Not on file      Comment: one cigarette a month   • Alcohol use No   • Drug use: No   • Sexual activity: Defer     Other Topics Concern   • Not on file     Social History Narrative         Current Outpatient Prescriptions   Medication Sig Dispense Refill   • cefTRIAXone (ROCEPHIN) 1 G injection Infuse 2 g into a venous catheter Daily. 20 g 0   • cefTRIAXone (ROCEPHIN) 1 g/100 mL 0.9% NS (MBP) Infuse 100 mL into a venous catheter Daily. Indications: Skin and Soft Tissue Infection 1000 each 0   • lisinopril-hydrochlorothiazide (PRINZIDE,ZESTORETIC) 20-25 MG per tablet   1   • traMADol (ULTRAM) 50 MG tablet Take 1 tablet by mouth Every 6 (Six) Hours As Needed for Moderate Pain (4-6) (pain). Take one to two every 4-6 hours prn pain. 40 tablet 0     No  "current facility-administered medications for this visit.          OBJECTIVE    Ht 70\" (177.8 cm)  Wt 213 lb (96.6 kg)  BMI 30.56 kg/m2      Review of Systems   Constitutional:  Denies recent weight loss, weight gain, fever or chills, no change in exercise tolerance  Musculoskeletal: Foot pain. H/o toe amputations   Skin:  Left foot incision  Neurological:  Burning sensations to feet b/l.  Psychiatric/Behavioral: Denies depression    Physical Exam   Constitutional: he appears well-developed and well-nourished.   HEENT: Normocephalic. Atraumatic.  CV: No CP. RRR  Resp: Non-labored respirations.  Psychiatric: he has a normal mood and affect. his behavior is normal.     Lower Extremity Exam:  Vascular: DP/PT pulses palpable 1+.   Negative hair growth.   2+ bilateral leg edema  Feel cool to touch  Neuro: Protective sensation absent, b/l.  DTRs intact  Integument:   Atrophic skin noted b/l  Weeping partial thickness ulcer to left anterior medial lower leg. No SOI  Left foot TMA incision coapted with sutures intact. No SOI  Central site of wound excision with 1.4 x 0.5 x 0.3 cm ulceration. No SOI. Fibrogranular base  Left lateral 5th metatarsal base ulceration 2.4 x 0.7 x 0.3 cm.  No SOI  Musculoskeletal: LE muscle strength 5/5.   Gait  normal  B/L TMA    Left foot wound debridement:  Risks and benefits discussed.  Left anterior TMA, lateral midfoot ulcer debrided of surrounding hyperkeratosis and devitalized tissue with 15 blade to level of subq  Pressure hemostasis obtained  Tolerated well        ASSESSMENT AND PLAN    Damian was seen today for post-op recheck and follow-up.    Diagnoses and all orders for this visit:    Foot ulcer, left, with fat layer exposed    Venous stasis ulcer, left    S/P transmetatarsal amputation of foot, left    -Comprehensive foot and ankle exam performed  -Wounds noted to central TMA flap and lateral midfoot as above. Wound debridement as above  -14mm Epifix disc applied to foot wounds, " secured with steri strips  -Dry dressing applied and Unna boot applied to left LE  -WB in surgical shoe only  -Leave dressing intact 1 week  -Recheck 1 week            This document has been electronically signed by Estuardo Fried DPM on June 18, 2017 9:22 PM     Estuardo Fried DPM  6/18/2017  9:22 PM

## 2017-06-22 ENCOUNTER — OFFICE VISIT (OUTPATIENT)
Dept: PODIATRY | Facility: CLINIC | Age: 57
End: 2017-06-22

## 2017-06-22 VITALS — HEIGHT: 70 IN | WEIGHT: 213 LBS | BODY MASS INDEX: 30.49 KG/M2

## 2017-06-22 DIAGNOSIS — Z89.432 S/P TRANSMETATARSAL AMPUTATION OF FOOT, LEFT (HCC): ICD-10-CM

## 2017-06-22 DIAGNOSIS — IMO0001 VENOUS STASIS ULCER, LEFT: ICD-10-CM

## 2017-06-22 DIAGNOSIS — L97.522 FOOT ULCER, LEFT, WITH FAT LAYER EXPOSED (HCC): Primary | ICD-10-CM

## 2017-06-22 PROCEDURE — 11042 DBRDMT SUBQ TIS 1ST 20SQCM/<: CPT | Performed by: PODIATRIST

## 2017-06-22 PROCEDURE — 29580 STRAPPING UNNA BOOT: CPT | Performed by: PODIATRIST

## 2017-06-22 NOTE — PROGRESS NOTES
"Damian Wild  1960  56 y.o. male     Patient presents today for a pst-op recheck of the left foot.     06/22/2017    Chief Complaint   Patient presents with   • Left Foot - post op recheck   • Left Lower Leg - Follow-up           History of Present Illness    Mr Wild is a 55 y/o male who presents s/p left transmetatarsal amputation. DOS 4/19/17. Doing well overall. Left venous stasis ulceration continues to improve.    Past Medical History:   Diagnosis Date   • Hypertension    • Stroke          Past Surgical History:   Procedure Laterality Date   • AMPUTATION DIGIT Left 4/19/2017    Procedure: LEFT FOOT TRANSMETATARSAL AMPUTATION, TENDO ACHILLES LENGTHENING.;  Surgeon: Estuardo Freid DPM;  Location: [facility];  Service:    • TOE AMPUTATION Right     All toes removed on right foot   • TOE AMPUTATION Left     great toe removed   • TONSILLECTOMY           No family history on file.      Social History     Social History   • Marital status:      Spouse name: N/A   • Number of children: N/A   • Years of education: N/A     Occupational History   • Not on file.     Social History Main Topics   • Smoking status: Current Some Day Smoker   • Smokeless tobacco: Not on file      Comment: one cigarette a month   • Alcohol use No   • Drug use: No   • Sexual activity: Defer     Other Topics Concern   • Not on file     Social History Narrative         Current Outpatient Prescriptions   Medication Sig Dispense Refill   • lisinopril-hydrochlorothiazide (PRINZIDE,ZESTORETIC) 20-25 MG per tablet   1   • traMADol (ULTRAM) 50 MG tablet Take 1 tablet by mouth Every 6 (Six) Hours As Needed for Moderate Pain (4-6) (pain). Take one to two every 4-6 hours prn pain. 40 tablet 0     No current facility-administered medications for this visit.          OBJECTIVE    Ht 70\" (177.8 cm)  Wt 213 lb (96.6 kg)  BMI 30.56 kg/m2      Review of Systems   Constitutional:  Denies recent weight loss, weight gain, fever or chills, " no change in exercise tolerance  Musculoskeletal: Foot pain. H/o toe amputations   Skin:  Left foot incision  Neurological:  Burning sensations to feet b/l.  Psychiatric/Behavioral: Denies depression    Physical Exam   Constitutional: he appears well-developed and well-nourished.   HEENT: Normocephalic. Atraumatic.  CV: No CP. RRR  Resp: Non-labored respirations.  Psychiatric: he has a normal mood and affect. his behavior is normal.     Lower Extremity Exam:  Vascular: DP/PT pulses palpable 1+.   Negative hair growth.   2+ bilateral leg edema  Feel cool to touch  Neuro: Protective sensation absent, b/l.  DTRs intact  Integument:   Atrophic skin noted b/l  Weeping partial thickness ulcer to left anterior medial lower leg. No SOI  Left foot TMA incision coapted with sutures intact. No SOI  Central site of wound excision with 0.9 x 0.4 x 0.2 cm ulceration. No SOI. Fibrogranular base  Left lateral 5th metatarsal base ulceration 2.0 x 0.7 x 0.3 cm.  No SOI  Musculoskeletal: LE muscle strength 5/5.   Gait  normal  B/L TMA    Left foot wound debridement:  Risks and benefits discussed.  Left anterior TMA, lateral midfoot ulcer debrided of surrounding hyperkeratosis and devitalized tissue with 15 blade to level of subq  Pressure hemostasis obtained  Tolerated well        ASSESSMENT AND PLAN    Damian was seen today for post op recheck and follow-up.    Diagnoses and all orders for this visit:    Foot ulcer, left, with fat layer exposed    Venous stasis ulcer, left    S/P transmetatarsal amputation of foot, left    -Comprehensive foot and ankle exam performed  -Wounds noted to central TMA flap and lateral midfoot as above. Wound debridement as above  -Dry dressing applied and Unna boot applied to left LE  -WB in surgical shoe only  -Leave dressing intact 1 week  -Recheck 1 week            This document has been electronically signed by Estuardo Fried DPM on June 24, 2017 8:29 PM     Estuardo Fried DPM  6/24/2017  8:29  PM

## 2017-06-29 ENCOUNTER — OFFICE VISIT (OUTPATIENT)
Dept: PODIATRY | Facility: CLINIC | Age: 57
End: 2017-06-29

## 2017-06-29 VITALS — WEIGHT: 213 LBS | BODY MASS INDEX: 30.49 KG/M2 | HEIGHT: 70 IN

## 2017-06-29 DIAGNOSIS — L97.522 FOOT ULCER, LEFT, WITH FAT LAYER EXPOSED (HCC): Primary | ICD-10-CM

## 2017-06-29 DIAGNOSIS — IMO0001 VENOUS STASIS ULCER, LEFT: ICD-10-CM

## 2017-06-29 DIAGNOSIS — Z89.432 S/P TRANSMETATARSAL AMPUTATION OF FOOT, LEFT (HCC): ICD-10-CM

## 2017-06-29 PROCEDURE — 15275 SKIN SUB GRAFT FACE/NK/HF/G: CPT | Performed by: PODIATRIST

## 2017-07-07 ENCOUNTER — OFFICE VISIT (OUTPATIENT)
Dept: PODIATRY | Facility: CLINIC | Age: 57
End: 2017-07-07

## 2017-07-07 VITALS — WEIGHT: 213 LBS | BODY MASS INDEX: 30.49 KG/M2 | HEIGHT: 70 IN

## 2017-07-07 DIAGNOSIS — L97.522 FOOT ULCER, LEFT, WITH FAT LAYER EXPOSED (HCC): Primary | ICD-10-CM

## 2017-07-07 DIAGNOSIS — Z89.432 S/P TRANSMETATARSAL AMPUTATION OF FOOT, LEFT (HCC): ICD-10-CM

## 2017-07-07 DIAGNOSIS — IMO0001 VENOUS STASIS ULCER, LEFT: ICD-10-CM

## 2017-07-07 PROCEDURE — 11042 DBRDMT SUBQ TIS 1ST 20SQCM/<: CPT | Performed by: PODIATRIST

## 2017-07-07 PROCEDURE — 29580 STRAPPING UNNA BOOT: CPT | Performed by: PODIATRIST

## 2017-07-07 NOTE — PROGRESS NOTES
"Damian Wild  1960  56 y.o. male     Patient presents today for a post-op recheck of the left foot.     07/07/2017    Chief Complaint   Patient presents with   • Left Foot - Follow-up   • Left Lower Leg - Follow-up       History of Present Illness    Mr Wild is a 55 y/o male who presents s/p left transmetatarsal amputation. DOS 4/19/17. Doing well overall. Since last visit he has developed recurrence of a venous stasis ulcer to his left anterior ankle and does also note some increased heel pain today.  Otherwise he is feeling well.    Past Medical History:   Diagnosis Date   • Hypertension    • Stroke          Past Surgical History:   Procedure Laterality Date   • AMPUTATION DIGIT Left 4/19/2017    Procedure: LEFT FOOT TRANSMETATARSAL AMPUTATION, TENDO ACHILLES LENGTHENING.;  Surgeon: Estuardo Fried DPM;  Location: [facility];  Service:    • TOE AMPUTATION Right     All toes removed on right foot   • TOE AMPUTATION Left     great toe removed   • TONSILLECTOMY           No family history on file.      Social History     Social History   • Marital status:      Spouse name: N/A   • Number of children: N/A   • Years of education: N/A     Occupational History   • Not on file.     Social History Main Topics   • Smoking status: Current Some Day Smoker   • Smokeless tobacco: Not on file      Comment: one cigarette a month   • Alcohol use No   • Drug use: No   • Sexual activity: Defer     Other Topics Concern   • Not on file     Social History Narrative         Current Outpatient Prescriptions   Medication Sig Dispense Refill   • lisinopril-hydrochlorothiazide (PRINZIDE,ZESTORETIC) 20-25 MG per tablet   1   • traMADol (ULTRAM) 50 MG tablet Take 1 tablet by mouth Every 6 (Six) Hours As Needed for Moderate Pain (4-6) (pain). Take one to two every 4-6 hours prn pain. 40 tablet 0     No current facility-administered medications for this visit.          OBJECTIVE    Ht 70\" (177.8 cm)  Wt 213 lb (96.6 " kg)  BMI 30.56 kg/m2      Review of Systems   Constitutional:  Denies recent weight loss, weight gain, fever or chills, no change in exercise tolerance  Musculoskeletal: Foot pain. H/o toe amputations   Skin:  Left foot incision  Neurological:  Burning sensations to feet b/l.  Psychiatric/Behavioral: Denies depression    Physical Exam   Constitutional: he appears well-developed and well-nourished.   HEENT: Normocephalic. Atraumatic.  CV: No CP. RRR  Resp: Non-labored respirations.  Psychiatric: he has a normal mood and affect. his behavior is normal.     Lower Extremity Exam:  Vascular: DP/PT pulses palpable 1+.   Negative hair growth.   2+ bilateral leg edema  Feel cool to touch  Neuro: Protective sensation absent, b/l.  DTRs intact  Integument:   Atrophic skin noted b/l  Weeping partial thickness ulcer to left anterior medial lower leg. 3x1.2cm. No SOI  Central site of wound excision no healed  Left lateral 5th metatarsal base ulceration 1.8 x 0.4 x 0.3 cm.  No SOI  New partial thickness ulceration to plantar left heel. Approx 3 x 1.2 cm. Mild drainage no PTB.  Musculoskeletal: LE muscle strength 5/5.   Gait  normal  B/L TMA    Left foot wound debridement:  Risks and benefits discussed.  Left lateral midfoot ulcer, plantar heel debrided of surrounding hyperkeratosis and devitalized tissue with 15 blade to level of subq  Pressure hemostasis obtained  Tolerated well        ASSESSMENT AND PLAN    Damian was seen today for follow-up and follow-up.    Diagnoses and all orders for this visit:    Foot ulcer, left, with fat layer exposed    Venous stasis ulcer, left    S/P transmetatarsal amputation of foot, left    -Comprehensive foot and ankle exam performed  -Patient with new onset left heel wound as noted above  -Wound debridement as above  -Left lower extremity Unna boot applied  -WB in surgical shoe only  -Leave dressing intact 1 week  -Recheck 1 week            This document has been electronically signed by  Estuardo Fried DPM on July 12, 2017 2:35 PM     Estuardo Fried DPM  7/12/2017  2:35 PM

## 2017-07-13 ENCOUNTER — OFFICE VISIT (OUTPATIENT)
Dept: PODIATRY | Facility: CLINIC | Age: 57
End: 2017-07-13

## 2017-07-13 VITALS — BODY MASS INDEX: 30.49 KG/M2 | HEIGHT: 70 IN | WEIGHT: 213 LBS

## 2017-07-13 DIAGNOSIS — L97.522 FOOT ULCER, LEFT, WITH FAT LAYER EXPOSED (HCC): Primary | ICD-10-CM

## 2017-07-13 DIAGNOSIS — L89.623: ICD-10-CM

## 2017-07-13 DIAGNOSIS — M79.672 FOOT PAIN, LEFT: ICD-10-CM

## 2017-07-13 DIAGNOSIS — IMO0001 VENOUS STASIS ULCER, LEFT: ICD-10-CM

## 2017-07-13 PROCEDURE — 29445 APPL RIGID TOT CNTC LEG CAST: CPT | Performed by: PODIATRIST

## 2017-07-13 PROCEDURE — 15275 SKIN SUB GRAFT FACE/NK/HF/G: CPT | Performed by: PODIATRIST

## 2017-07-13 RX ORDER — CLINDAMYCIN HYDROCHLORIDE 300 MG/1
300 CAPSULE ORAL 3 TIMES DAILY
Qty: 30 CAPSULE | Refills: 0 | Status: SHIPPED | OUTPATIENT
Start: 2017-07-13 | End: 2017-09-28 | Stop reason: SDUPTHER

## 2017-07-13 RX ORDER — HYDROCODONE BITARTRATE AND ACETAMINOPHEN 5; 325 MG/1; MG/1
TABLET ORAL
Qty: 30 TABLET | Refills: 0 | Status: SHIPPED | OUTPATIENT
Start: 2017-07-13 | End: 2017-07-27 | Stop reason: SDUPTHER

## 2017-07-13 NOTE — PROGRESS NOTES
Damian Wild  1960  56 y.o. male     Patient presents today for a post-op recheck of the left foot.     07/13/2017    Chief Complaint   Patient presents with   • Left Foot - Follow-up   • Left Lower Leg - Follow-up       History of Present Illness    Mr Wild is a 55 y/o male who presents s/p left transmetatarsal amputation. DOS 4/19/17. Doing well overall. He has developed a new heel ulceration, which is quite painful. Venous stasis ulcerations are improving.    Past Medical History:   Diagnosis Date   • Hypertension    • Stroke          Past Surgical History:   Procedure Laterality Date   • AMPUTATION DIGIT Left 4/19/2017    Procedure: LEFT FOOT TRANSMETATARSAL AMPUTATION, TENDO ACHILLES LENGTHENING.;  Surgeon: Estuardo Fried DPM;  Location: [facility];  Service:    • TOE AMPUTATION Right     All toes removed on right foot   • TOE AMPUTATION Left     great toe removed   • TONSILLECTOMY           No family history on file.      Social History     Social History   • Marital status:      Spouse name: N/A   • Number of children: N/A   • Years of education: N/A     Occupational History   • Not on file.     Social History Main Topics   • Smoking status: Current Some Day Smoker   • Smokeless tobacco: Not on file      Comment: one cigarette a month   • Alcohol use No   • Drug use: No   • Sexual activity: Defer     Other Topics Concern   • Not on file     Social History Narrative         Current Outpatient Prescriptions   Medication Sig Dispense Refill   • lisinopril-hydrochlorothiazide (PRINZIDE,ZESTORETIC) 20-25 MG per tablet   1   • traMADol (ULTRAM) 50 MG tablet Take 1 tablet by mouth Every 6 (Six) Hours As Needed for Moderate Pain (4-6) (pain). Take one to two every 4-6 hours prn pain. 40 tablet 0   • clindamycin (CLEOCIN) 300 MG capsule Take 1 capsule by mouth 3 (Three) Times a Day. 30 capsule 0   • HYDROcodone-acetaminophen (NORCO) 5-325 MG per tablet Take one  tablet every 4-6 hours prn  "pain 30 tablet 0     No current facility-administered medications for this visit.          OBJECTIVE    Ht 70\" (177.8 cm)  Wt 213 lb (96.6 kg)  BMI 30.56 kg/m2      Review of Systems   Constitutional:  Denies recent weight loss, weight gain, fever or chills, no change in exercise tolerance  Musculoskeletal: Foot pain. H/o toe amputations   Skin:  Left foot incision  Neurological:  Burning sensations to feet b/l.  Psychiatric/Behavioral: Denies depression    Physical Exam   Constitutional: he appears well-developed and well-nourished.   HEENT: Normocephalic. Atraumatic.  CV: No CP. RRR  Resp: Non-labored respirations.  Psychiatric: he has a normal mood and affect. his behavior is normal.     Lower Extremity Exam:  Vascular: DP/PT pulses palpable 1+.   Negative hair growth.   2+ bilateral leg edema  Feel cool to touch  Neuro: Protective sensation absent, b/l.  DTRs intact  Integument:   Atrophic skin noted b/l  Weeping partial thickness ulcer to left anterior medial lower leg. 2x1.2cm. No SOI  Central site of wound excision no healed  Left lateral 5th metatarsal base ulceration 1.8 x 0.4 x 0.3 cm.  No SOI  Full thickness ulceration to plantar left heel. Approx 3 x 1.4 x 0.2 cm. Mild drainage, no PTB.  Musculoskeletal: LE muscle strength 5/5.   Gait  normal  B/L TMA    Left foot wound debridement:  Risks and benefits discussed.  Left lateral midfoot ulcer, plantar heel debrided of surrounding hyperkeratosis and devitalized tissue with 15 blade to level of subq  Pressure hemostasis obtained  Tolerated well        ASSESSMENT AND PLAN    Damian was seen today for follow-up and follow-up.    Diagnoses and all orders for this visit:    Foot ulcer, left, with fat layer exposed    Foot pain, left  -     XR Foot 3+ View Left    Venous stasis ulcer, left    Pressure ulcer, heel, left, stage III    Other orders  -     HYDROcodone-acetaminophen (NORCO) 5-325 MG per tablet; Take one  tablet every 4-6 hours prn pain  -     " clindamycin (CLEOCIN) 300 MG capsule; Take 1 capsule by mouth 3 (Three) Times a Day.    -Comprehensive foot and ankle exam performed  -Wound debridement as above  -4x4.5 cm Epifix applied to lateral midfoot and plantar heel wounds.  -TTC-EZ applied per  specifications  -Recheck 1 week            This document has been electronically signed by Estuardo Fried DPM on July 22, 2017 9:21 PM     Estuardo Fried DPM  7/22/2017  9:21 PM

## 2017-07-20 ENCOUNTER — OFFICE VISIT (OUTPATIENT)
Dept: PODIATRY | Facility: CLINIC | Age: 57
End: 2017-07-20

## 2017-07-20 VITALS — HEIGHT: 70 IN | BODY MASS INDEX: 30.49 KG/M2 | WEIGHT: 213 LBS

## 2017-07-20 DIAGNOSIS — L97.522 FOOT ULCER, LEFT, WITH FAT LAYER EXPOSED (HCC): ICD-10-CM

## 2017-07-20 DIAGNOSIS — Z89.432 S/P TRANSMETATARSAL AMPUTATION OF FOOT, LEFT (HCC): ICD-10-CM

## 2017-07-20 DIAGNOSIS — IMO0001 VENOUS STASIS ULCER, LEFT: ICD-10-CM

## 2017-07-20 DIAGNOSIS — L89.623: ICD-10-CM

## 2017-07-20 DIAGNOSIS — M79.89 SOFT TISSUE MASS: Primary | ICD-10-CM

## 2017-07-20 PROCEDURE — 99213 OFFICE O/P EST LOW 20 MIN: CPT | Performed by: PODIATRIST

## 2017-07-20 PROCEDURE — 29445 APPL RIGID TOT CNTC LEG CAST: CPT | Performed by: PODIATRIST

## 2017-07-20 PROCEDURE — 11042 DBRDMT SUBQ TIS 1ST 20SQCM/<: CPT | Performed by: PODIATRIST

## 2017-07-20 NOTE — PROGRESS NOTES
Damian Wild  1960  56 y.o. male     Patient presents today for a post-op recheck of the left foot.     07/07/2017    Chief Complaint   Patient presents with   • Left Foot - Follow-up   • Left Lower Leg - Follow-up       History of Present Illness    Mr Wild is a 57 y/o male who presents s/p left transmetatarsal amputation. DOS 4/19/17. Doing well overall. Since last visit he has developed recurrence of a venous stasis ulcer to his left anterior ankle and does also note some increased heel pain today.  Otherwise he is feeling well.    Past Medical History:   Diagnosis Date   • Hypertension    • Stroke          Past Surgical History:   Procedure Laterality Date   • AMPUTATION DIGIT Left 4/19/2017    Procedure: LEFT FOOT TRANSMETATARSAL AMPUTATION, TENDO ACHILLES LENGTHENING.;  Surgeon: Estuardo Fried DPM;  Location: [facility];  Service:    • TOE AMPUTATION Right     All toes removed on right foot   • TOE AMPUTATION Left     great toe removed   • TONSILLECTOMY           No family history on file.      Social History     Social History   • Marital status:      Spouse name: N/A   • Number of children: N/A   • Years of education: N/A     Occupational History   • Not on file.     Social History Main Topics   • Smoking status: Current Some Day Smoker   • Smokeless tobacco: Not on file      Comment: one cigarette a month   • Alcohol use No   • Drug use: No   • Sexual activity: Defer     Other Topics Concern   • Not on file     Social History Narrative         Current Outpatient Prescriptions   Medication Sig Dispense Refill   • clindamycin (CLEOCIN) 300 MG capsule Take 1 capsule by mouth 3 (Three) Times a Day. 30 capsule 0   • HYDROcodone-acetaminophen (NORCO) 5-325 MG per tablet Take one  tablet every 4-6 hours prn pain 30 tablet 0   • lisinopril-hydrochlorothiazide (PRINZIDE,ZESTORETIC) 20-25 MG per tablet   1   • traMADol (ULTRAM) 50 MG tablet Take 1 tablet by mouth Every 6 (Six) Hours As  "Needed for Moderate Pain (4-6) (pain). Take one to two every 4-6 hours prn pain. 40 tablet 0     No current facility-administered medications for this visit.          OBJECTIVE    Ht 70\" (177.8 cm)  Wt 213 lb (96.6 kg)  BMI 30.56 kg/m2      Review of Systems   Constitutional:  Denies recent weight loss, weight gain, fever or chills, no change in exercise tolerance  Musculoskeletal: Foot pain. H/o toe amputations   Skin:  Left foot incision  Neurological:  Burning sensations to feet b/l.  Psychiatric/Behavioral: Denies depression    Physical Exam   Constitutional: he appears well-developed and well-nourished.   HEENT: Normocephalic. Atraumatic.  CV: No CP. RRR  Resp: Non-labored respirations.  Psychiatric: he has a normal mood and affect. his behavior is normal.     Lower Extremity Exam:  Vascular: DP/PT pulses palpable 1+.   Negative hair growth.   2+ bilateral leg edema  Feel cool to touch  Neuro: Protective sensation absent, b/l.  DTRs intact  Integument:   Atrophic skin noted b/l  Weeping partial thickness ulcer to left anterior medial lower leg. 3x1.2cm. No SOI  Central site of wound excision no healed  Left lateral 5th metatarsal base ulceration 1.8 x 0.4 x 0.3 cm.  No SOI  New partial thickness ulceration to plantar left heel. Approx 3 x 1.2 cm. Mild drainage no PTB.  Musculoskeletal: LE muscle strength 5/5.   Gait  normal  B/L TMA    Left foot wound debridement:  Risks and benefits discussed.  Left lateral midfoot ulcer, plantar heel debrided of surrounding hyperkeratosis and devitalized tissue with 15 blade to level of subq  Pressure hemostasis obtained  Tolerated well        ASSESSMENT AND PLAN    There are no diagnoses linked to this encounter.  -Comprehensive foot and ankle exam performed  -Patient with new onset left heel wound as noted above  -Wound debridement as above  -Left lower extremity Unna boot applied  -WB in surgical shoe only  -Leave dressing intact 1 week  -Recheck 1 week            This " document has been electronically signed by Fatimah Fried MA on July 20, 2017 9:21 AM     Fatimah Fried MA  7/20/2017  9:21 AM

## 2017-07-23 NOTE — PROGRESS NOTES
Damian Wild  1960  56 y.o. male     Patient presents today for a post-op recheck of the left foot.     07/20/2017    Chief Complaint   Patient presents with   • Left Foot - Follow-up   • Left Lower Leg - Follow-up       History of Present Illness    Mr Wild is a 57 y/o male who presents s/p left transmetatarsal amputation. DOS 4/19/17. He has recently developed a left plantar heel ulceration as well as anterior ankle venous stasis ulceration.  At last visit we did apply an additional) fixed graft and placed him into a total contact cast.  He states his pain is improving today.  He does also note a non-painful soft tissue mass to his right lateral calf which is been present for about a month.    Past Medical History:   Diagnosis Date   • Hypertension    • Stroke          Past Surgical History:   Procedure Laterality Date   • AMPUTATION DIGIT Left 4/19/2017    Procedure: LEFT FOOT TRANSMETATARSAL AMPUTATION, TENDO ACHILLES LENGTHENING.;  Surgeon: Estuardo Fried DPM;  Location: [facility];  Service:    • TOE AMPUTATION Right     All toes removed on right foot   • TOE AMPUTATION Left     great toe removed   • TONSILLECTOMY           No family history on file.      Social History     Social History   • Marital status:      Spouse name: N/A   • Number of children: N/A   • Years of education: N/A     Occupational History   • Not on file.     Social History Main Topics   • Smoking status: Current Some Day Smoker   • Smokeless tobacco: Not on file      Comment: one cigarette a month   • Alcohol use No   • Drug use: No   • Sexual activity: Defer     Other Topics Concern   • Not on file     Social History Narrative         Current Outpatient Prescriptions   Medication Sig Dispense Refill   • clindamycin (CLEOCIN) 300 MG capsule Take 1 capsule by mouth 3 (Three) Times a Day. 30 capsule 0   • HYDROcodone-acetaminophen (NORCO) 5-325 MG per tablet Take one  tablet every 4-6 hours prn pain 30 tablet 0   •  "lisinopril-hydrochlorothiazide (PRINZIDE,ZESTORETIC) 20-25 MG per tablet   1   • traMADol (ULTRAM) 50 MG tablet Take 1 tablet by mouth Every 6 (Six) Hours As Needed for Moderate Pain (4-6) (pain). Take one to two every 4-6 hours prn pain. 40 tablet 0     No current facility-administered medications for this visit.          OBJECTIVE    Ht 70\" (177.8 cm)  Wt 213 lb (96.6 kg)  BMI 30.56 kg/m2      Review of Systems   Constitutional:  Denies recent weight loss, weight gain, fever or chills, no change in exercise tolerance  Musculoskeletal: Foot pain. H/o toe amputations   Skin:  Left foot incision  Neurological:  Burning sensations to feet b/l.  Psychiatric/Behavioral: Denies depression    Physical Exam   Constitutional: he appears well-developed and well-nourished.   HEENT: Normocephalic. Atraumatic.  CV: No CP. RRR  Resp: Non-labored respirations.  Psychiatric: he has a normal mood and affect. his behavior is normal.     Lower Extremity Exam:  Vascular: DP/PT pulses palpable 1+.   Negative hair growth.   2+ bilateral leg edema  Feel cool to touch  Neuro: Protective sensation absent, b/l.  DTRs intact  Integument:   Atrophic skin noted b/l  Weeping partial thickness ulcer to left anterior medial lower leg. 2x1.2cm. No SOI  Central site of wound excision no healed  Left lateral 5th metatarsal base ulceration 1.4 x 0.4 x 0.3 cm.  No SOI  Full thickness ulceration to plantar left heel. Approx 2 x 1.8 x 0.2 cm. Mild drainage, no PTB.  Musculoskeletal: LE muscle strength 5/5.   Gait  normal  B/L TMA  Soft, not easily mobile approx 2 cc soft tissue mass noted to anterior lateral right lower leg    Right tib/fib radiographs- Normal osseous density. No acute fractures, subluxations or erosions.  Slight distention of proximal lateral soft tissue without noticeable calcific mass.      Left foot wound debridement:  Risks and benefits discussed.  Left lateral midfoot ulcer, plantar heel debrided of surrounding hyperkeratosis " and devitalized tissue with 15 blade to level of subq  Pressure hemostasis obtained  Tolerated well        ASSESSMENT AND PLAN    Damian was seen today for follow-up and follow-up.    Diagnoses and all orders for this visit:    Soft tissue mass  -     XR Tibia Fibula 2 View Right    Venous stasis ulcer, left    Pressure ulcer, heel, left, stage III    Foot ulcer, left, with fat layer exposed    S/P transmetatarsal amputation of foot, left      -Comprehensive foot and ankle exam performed  -Wound debridement as above  -TTC-EZ applied per  specifications  -Will continue to monitor the right calf mass, possible MRI in future if it persists or enlarges.  -Recheck 1 week            This document has been electronically signed by Estuardo Fried DPM on July 23, 2017 1:45 PM     Estuardo Fried DPM  7/23/2017  1:45 PM

## 2017-07-27 ENCOUNTER — OFFICE VISIT (OUTPATIENT)
Dept: PODIATRY | Facility: CLINIC | Age: 57
End: 2017-07-27

## 2017-07-27 VITALS — BODY MASS INDEX: 30.49 KG/M2 | WEIGHT: 213 LBS | HEIGHT: 70 IN

## 2017-07-27 DIAGNOSIS — Z89.432 S/P TRANSMETATARSAL AMPUTATION OF FOOT, LEFT (HCC): ICD-10-CM

## 2017-07-27 DIAGNOSIS — IMO0001 VENOUS STASIS ULCER, LEFT: Primary | ICD-10-CM

## 2017-07-27 DIAGNOSIS — L97.522 FOOT ULCER, LEFT, WITH FAT LAYER EXPOSED (HCC): ICD-10-CM

## 2017-07-27 DIAGNOSIS — L89.623: ICD-10-CM

## 2017-07-27 PROCEDURE — 29445 APPL RIGID TOT CNTC LEG CAST: CPT | Performed by: PODIATRIST

## 2017-07-27 PROCEDURE — 11042 DBRDMT SUBQ TIS 1ST 20SQCM/<: CPT | Performed by: PODIATRIST

## 2017-07-27 RX ORDER — SULFAMETHOXAZOLE AND TRIMETHOPRIM 800; 160 MG/1; MG/1
1 TABLET ORAL 2 TIMES DAILY
Qty: 20 TABLET | Refills: 0 | Status: SHIPPED | OUTPATIENT
Start: 2017-07-27 | End: 2017-09-01 | Stop reason: SDUPTHER

## 2017-07-27 RX ORDER — HYDROCODONE BITARTRATE AND ACETAMINOPHEN 5; 325 MG/1; MG/1
TABLET ORAL
Qty: 40 TABLET | Refills: 0 | Status: SHIPPED | OUTPATIENT
Start: 2017-07-27 | End: 2017-09-01 | Stop reason: SDUPTHER

## 2017-07-27 NOTE — PROGRESS NOTES
Damian Wild  1960  56 y.o. male     Patient presents today for a recheck of the left foot.     07/27/2017    Chief Complaint   Patient presents with   • Left Foot - Follow-up       History of Present Illness    Mr Wild is a 55 y/o male who presents s/p left transmetatarsal amputation. DOS 4/19/17. He has recently developed a left plantar heel ulceration as well as anterior ankle venous stasis ulceration.  At last visit we did place him into a total contact cast.  He states his pain is improving today.      Past Medical History:   Diagnosis Date   • Hypertension    • Stroke          Past Surgical History:   Procedure Laterality Date   • AMPUTATION DIGIT Left 4/19/2017    Procedure: LEFT FOOT TRANSMETATARSAL AMPUTATION, TENDO ACHILLES LENGTHENING.;  Surgeon: Estuardo Fried DPM;  Location: [facility];  Service:    • TOE AMPUTATION Right     All toes removed on right foot   • TOE AMPUTATION Left     great toe removed   • TONSILLECTOMY           No family history on file.      Social History     Social History   • Marital status:      Spouse name: N/A   • Number of children: N/A   • Years of education: N/A     Occupational History   • Not on file.     Social History Main Topics   • Smoking status: Current Some Day Smoker   • Smokeless tobacco: Not on file      Comment: one cigarette a month   • Alcohol use No   • Drug use: No   • Sexual activity: Defer     Other Topics Concern   • Not on file     Social History Narrative         Current Outpatient Prescriptions   Medication Sig Dispense Refill   • clindamycin (CLEOCIN) 300 MG capsule Take 1 capsule by mouth 3 (Three) Times a Day. 30 capsule 0   • HYDROcodone-acetaminophen (NORCO) 5-325 MG per tablet Take one  tablet every 4-6 hours prn pain 40 tablet 0   • lisinopril-hydrochlorothiazide (PRINZIDE,ZESTORETIC) 20-25 MG per tablet   1   • traMADol (ULTRAM) 50 MG tablet Take 1 tablet by mouth Every 6 (Six) Hours As Needed for Moderate Pain (4-6)  "(pain). Take one to two every 4-6 hours prn pain. 40 tablet 0   • sulfamethoxazole-trimethoprim (BACTRIM DS) 800-160 MG per tablet Take 1 tablet by mouth 2 (Two) Times a Day. 20 tablet 0     No current facility-administered medications for this visit.          OBJECTIVE    Ht 70\" (177.8 cm)  Wt 213 lb (96.6 kg)  BMI 30.56 kg/m2      Review of Systems   Constitutional:  Denies recent weight loss, weight gain, fever or chills, no change in exercise tolerance  Musculoskeletal: Foot pain. H/o toe amputations   Skin:  Left foot incision  Neurological:  Burning sensations to feet b/l.  Psychiatric/Behavioral: Denies depression    Physical Exam   Constitutional: he appears well-developed and well-nourished.   HEENT: Normocephalic. Atraumatic.  CV: No CP. RRR  Resp: Non-labored respirations.  Psychiatric: he has a normal mood and affect. his behavior is normal.     Lower Extremity Exam:  Vascular: DP/PT pulses palpable 1+.   Negative hair growth.   2+ bilateral leg edema  Feel cool to touch  Neuro: Protective sensation absent, b/l.  DTRs intact  Integument:   Atrophic skin noted b/l  Weeping partial thickness ulcer to left anterior medial lower leg. 2x1.2cm. No SOI  Central site of wound excision no healed  Left lateral 5th metatarsal base ulceration 1.2 x 0.4 x 0.3 cm.  No SOI  Full thickness ulceration to plantar left heel. Approx 2 x 1.8 x 0.2 cm. Mild drainage, no PTB.  Musculoskeletal: LE muscle strength 5/5.   Gait  normal  B/L TMA  Soft, not easily mobile approx 2 cc soft tissue mass noted to anterior lateral right lower leg      Left foot wound debridement:  Risks and benefits discussed.  Left lateral midfoot ulcer, plantar heel debrided of surrounding hyperkeratosis and devitalized tissue with 15 blade to level of subq  Pressure hemostasis obtained  Tolerated well        ASSESSMENT AND PLAN    Damian was seen today for follow-up.    Diagnoses and all orders for this visit:    Venous stasis ulcer, left    Pressure " ulcer, heel, left, stage III    Foot ulcer, left, with fat layer exposed    S/P transmetatarsal amputation of foot, left    Other orders  -     HYDROcodone-acetaminophen (NORCO) 5-325 MG per tablet; Take one  tablet every 4-6 hours prn pain  -     sulfamethoxazole-trimethoprim (BACTRIM DS) 800-160 MG per tablet; Take 1 tablet by mouth 2 (Two) Times a Day.    -Comprehensive foot and ankle exam performed  -Wound debridement as above  -TTC-EZ applied per  specifications  -Bactrim due to increased pain to plantar heel.  -Will continue to monitor the right calf mass, possible MRI in future if it persists or enlarges.  -Recheck 1 week            This document has been electronically signed by Estuardo Fried DPM on July 31, 2017 7:50 AM     Estuardo Fried DPM  7/31/2017  7:50 AM

## 2017-08-04 ENCOUNTER — OFFICE VISIT (OUTPATIENT)
Dept: PODIATRY | Facility: CLINIC | Age: 57
End: 2017-08-04

## 2017-08-04 VITALS — BODY MASS INDEX: 30.49 KG/M2 | HEIGHT: 70 IN | WEIGHT: 213 LBS

## 2017-08-04 DIAGNOSIS — L97.522 FOOT ULCER, LEFT, WITH FAT LAYER EXPOSED (HCC): ICD-10-CM

## 2017-08-04 DIAGNOSIS — L89.623: ICD-10-CM

## 2017-08-04 DIAGNOSIS — Z89.432 S/P TRANSMETATARSAL AMPUTATION OF FOOT, LEFT (HCC): ICD-10-CM

## 2017-08-04 DIAGNOSIS — IMO0001 VENOUS STASIS ULCER, LEFT: Primary | ICD-10-CM

## 2017-08-04 PROCEDURE — 15275 SKIN SUB GRAFT FACE/NK/HF/G: CPT | Performed by: PODIATRIST

## 2017-08-04 NOTE — PROGRESS NOTES
Damian Wild  1960  56 y.o. male     Patient presents today for a recheck of the left foot.     08/04/2017      Chief Complaint   Patient presents with   • Left Foot - Follow-up       History of Present Illness    Mr Wild is a 55 y/o male who presents s/p left transmetatarsal amputation. DOS 4/19/17. He has recently developed a left plantar heel ulceration as well as anterior ankle venous stasis ulceration.  At last visit we did place him into a total contact cast.  He states his pain is improving today.      Past Medical History:   Diagnosis Date   • Hypertension    • Stroke          Past Surgical History:   Procedure Laterality Date   • AMPUTATION DIGIT Left 4/19/2017    Procedure: LEFT FOOT TRANSMETATARSAL AMPUTATION, TENDO ACHILLES LENGTHENING.;  Surgeon: Estuardo Fried DPM;  Location: [facility];  Service:    • TOE AMPUTATION Right     All toes removed on right foot   • TOE AMPUTATION Left     great toe removed   • TONSILLECTOMY           No family history on file.      Social History     Social History   • Marital status:      Spouse name: N/A   • Number of children: N/A   • Years of education: N/A     Occupational History   • Not on file.     Social History Main Topics   • Smoking status: Current Some Day Smoker   • Smokeless tobacco: Not on file      Comment: one cigarette a month   • Alcohol use No   • Drug use: No   • Sexual activity: Defer     Other Topics Concern   • Not on file     Social History Narrative         Current Outpatient Prescriptions   Medication Sig Dispense Refill   • clindamycin (CLEOCIN) 300 MG capsule Take 1 capsule by mouth 3 (Three) Times a Day. 30 capsule 0   • HYDROcodone-acetaminophen (NORCO) 5-325 MG per tablet Take one  tablet every 4-6 hours prn pain 40 tablet 0   • lisinopril-hydrochlorothiazide (PRINZIDE,ZESTORETIC) 20-25 MG per tablet   1   • sulfamethoxazole-trimethoprim (BACTRIM DS) 800-160 MG per tablet Take 1 tablet by mouth 2 (Two) Times a Day.  "20 tablet 0   • traMADol (ULTRAM) 50 MG tablet Take 1 tablet by mouth Every 6 (Six) Hours As Needed for Moderate Pain (4-6) (pain). Take one to two every 4-6 hours prn pain. 40 tablet 0     No current facility-administered medications for this visit.          OBJECTIVE    Ht 70\" (177.8 cm)  Wt 213 lb (96.6 kg)  BMI 30.56 kg/m2      Review of Systems   Constitutional:  Denies recent weight loss, weight gain, fever or chills, no change in exercise tolerance  Musculoskeletal: Foot pain. H/o toe amputations   Skin:  Left foot incision  Neurological:  Burning sensations to feet b/l.  Psychiatric/Behavioral: Denies depression    Physical Exam   Constitutional: he appears well-developed and well-nourished.   HEENT: Normocephalic. Atraumatic.  CV: No CP. RRR  Resp: Non-labored respirations.  Psychiatric: he has a normal mood and affect. his behavior is normal.     Lower Extremity Exam:  Vascular: DP/PT pulses palpable 1+.   Negative hair growth.   2+ bilateral leg edema  Feel cool to touch  Neuro: Protective sensation absent, b/l.  DTRs intact  Integument:   Atrophic skin noted b/l  Partial thickness leg ulcer now healed. No SOI  Central site of wound excision no healed  Left lateral 5th metatarsal base ulceration 1.0 x 0.4 x 0.3 cm.  No SOI  Full thickness ulceration to plantar left heel. Approx 2.4 x 2.1 x 0.2 cm. Mild drainage, no PTB.  Musculoskeletal: LE muscle strength 5/5.   Gait  normal  B/L TMA  Soft, not easily mobile approx 2 cc soft tissue mass noted to anterior lateral right lower leg      Left foot wound debridement:  Risks and benefits discussed.  Left lateral midfoot ulcer, plantar heel debrided of surrounding hyperkeratosis and devitalized tissue with 15 blade to level of subq  Pressure hemostasis obtained  Tolerated well        ASSESSMENT AND PLAN    Damian was seen today for follow-up.    Diagnoses and all orders for this visit:    Venous stasis ulcer, left    Pressure ulcer, heel, left, stage " III    Foot ulcer, left, with fat layer exposed    S/P transmetatarsal amputation of foot, left    -Comprehensive foot and ankle exam performed  -Wound debridement as above  -4 x 4.5cm Epifix applied  -TTC-EZ applied per  specifications  -Will continue to monitor the right calf mass, possible MRI in future if it persists or enlarges.  -Recheck 1 week            This document has been electronically signed by Estuardo Fried DPM on August 12, 2017 12:12 PM     Estuardo Fried DPM  8/12/2017  12:12 PM

## 2017-08-11 ENCOUNTER — OFFICE VISIT (OUTPATIENT)
Dept: PODIATRY | Facility: CLINIC | Age: 57
End: 2017-08-11

## 2017-08-11 VITALS — HEIGHT: 70 IN | WEIGHT: 213 LBS | BODY MASS INDEX: 30.49 KG/M2

## 2017-08-11 DIAGNOSIS — L97.522 FOOT ULCER, LEFT, WITH FAT LAYER EXPOSED (HCC): ICD-10-CM

## 2017-08-11 DIAGNOSIS — L89.623: Primary | ICD-10-CM

## 2017-08-11 DIAGNOSIS — Z89.432 S/P TRANSMETATARSAL AMPUTATION OF FOOT, LEFT (HCC): ICD-10-CM

## 2017-08-11 PROCEDURE — 29445 APPL RIGID TOT CNTC LEG CAST: CPT | Performed by: PODIATRIST

## 2017-08-11 PROCEDURE — 11042 DBRDMT SUBQ TIS 1ST 20SQCM/<: CPT | Performed by: PODIATRIST

## 2017-08-11 NOTE — PROGRESS NOTES
Damian Wild  1960  56 y.o. male     Patient presents today for a recheck of the left foot.     08/11/2017      Chief Complaint   Patient presents with   • Left Foot - Follow-up, Wound Check       History of Present Illness    Mr Wild is a 57 y/o male who presents s/p left transmetatarsal amputation. DOS 4/19/17. He has recently developed a left plantar heel ulceration. At last visit we did apply and Epifix graft and place him into a total contact cast.  He states his pain is improving today.      Past Medical History:   Diagnosis Date   • Hypertension    • Stroke          Past Surgical History:   Procedure Laterality Date   • AMPUTATION DIGIT Left 4/19/2017    Procedure: LEFT FOOT TRANSMETATARSAL AMPUTATION, TENDO ACHILLES LENGTHENING.;  Surgeon: Estuardo Fried DPM;  Location: [facility];  Service:    • TOE AMPUTATION Right     All toes removed on right foot   • TOE AMPUTATION Left     great toe removed   • TONSILLECTOMY           No family history on file.      Social History     Social History   • Marital status:      Spouse name: N/A   • Number of children: N/A   • Years of education: N/A     Occupational History   • Not on file.     Social History Main Topics   • Smoking status: Current Some Day Smoker   • Smokeless tobacco: Not on file      Comment: one cigarette a month   • Alcohol use No   • Drug use: No   • Sexual activity: Defer     Other Topics Concern   • Not on file     Social History Narrative         Current Outpatient Prescriptions   Medication Sig Dispense Refill   • clindamycin (CLEOCIN) 300 MG capsule Take 1 capsule by mouth 3 (Three) Times a Day. 30 capsule 0   • HYDROcodone-acetaminophen (NORCO) 5-325 MG per tablet Take one  tablet every 4-6 hours prn pain 40 tablet 0   • lisinopril-hydrochlorothiazide (PRINZIDE,ZESTORETIC) 20-25 MG per tablet   1   • sulfamethoxazole-trimethoprim (BACTRIM DS) 800-160 MG per tablet Take 1 tablet by mouth 2 (Two) Times a Day. 20 tablet 0  "  • traMADol (ULTRAM) 50 MG tablet Take 1 tablet by mouth Every 6 (Six) Hours As Needed for Moderate Pain (4-6) (pain). Take one to two every 4-6 hours prn pain. 40 tablet 0     No current facility-administered medications for this visit.          OBJECTIVE    Ht 70\" (177.8 cm)  Wt 213 lb (96.6 kg)  BMI 30.56 kg/m2      Review of Systems   Constitutional:  Denies recent weight loss, weight gain, fever or chills, no change in exercise tolerance  Musculoskeletal: Foot pain. H/o toe amputations   Skin:  Left foot incision  Neurological:  Burning sensations to feet b/l.  Psychiatric/Behavioral: Denies depression    Physical Exam   Constitutional: he appears well-developed and well-nourished.   HEENT: Normocephalic. Atraumatic.  CV: No CP. RRR  Resp: Non-labored respirations.  Psychiatric: he has a normal mood and affect. his behavior is normal.     Lower Extremity Exam:  Vascular: DP/PT pulses palpable 1+.   Negative hair growth.   2+ bilateral leg edema  Feel cool to touch  Neuro: Protective sensation absent, b/l.  DTRs intact  Integument:   Atrophic skin noted b/l  Central site of wound excision no healed  Left lateral 5th metatarsal base ulceration 0.8 x 0.4 x 0.3 cm.  No SOI  Full thickness ulceration to plantar left heel. Approx 2 x 2.2 x 0.2 cm. Mild drainage, no PTB.  Musculoskeletal: LE muscle strength 5/5.   Gait  normal  B/L TMA  Soft, not easily mobile approx 2 cc soft tissue mass noted to anterior lateral right lower leg      Left foot wound debridement:  Risks and benefits discussed.  Left lateral midfoot ulcer, plantar heel debrided of surrounding hyperkeratosis and devitalized tissue with 15 blade to level of subq  Pressure hemostasis obtained  Tolerated well        ASSESSMENT AND PLAN    Damian was seen today for follow-up and wound check.    Diagnoses and all orders for this visit:    Pressure ulcer, heel, left, stage III    S/P transmetatarsal amputation of foot, left    Foot ulcer, left, with fat " layer exposed    -Comprehensive foot and ankle exam performed  -Wound debridement as above  -TTC-EZ applied per  specifications  -Will continue to monitor the right calf mass, possible MRI in future if it persists or enlarges.  -Recheck 1 week            This document has been electronically signed by Estuardo Fried DPM on August 12, 2017 1:08 PM     Estuardo Fried DPM  8/12/2017  1:08 PM

## 2017-08-18 ENCOUNTER — OFFICE VISIT (OUTPATIENT)
Dept: PODIATRY | Facility: CLINIC | Age: 57
End: 2017-08-18

## 2017-08-18 VITALS — WEIGHT: 213 LBS | HEIGHT: 70 IN | BODY MASS INDEX: 30.49 KG/M2

## 2017-08-18 DIAGNOSIS — L97.522 FOOT ULCER, LEFT, WITH FAT LAYER EXPOSED (HCC): ICD-10-CM

## 2017-08-18 DIAGNOSIS — L89.623: Primary | ICD-10-CM

## 2017-08-18 DIAGNOSIS — Z89.432 S/P TRANSMETATARSAL AMPUTATION OF FOOT, LEFT (HCC): ICD-10-CM

## 2017-08-18 PROCEDURE — 11042 DBRDMT SUBQ TIS 1ST 20SQCM/<: CPT | Performed by: PODIATRIST

## 2017-08-18 PROCEDURE — 29445 APPL RIGID TOT CNTC LEG CAST: CPT | Performed by: PODIATRIST

## 2017-08-18 NOTE — PROGRESS NOTES
Damian Wild  1960  56 y.o. male     Patient presents today for a recheck of the left foot.   08/18/2017      Chief Complaint   Patient presents with   • Left Foot - Follow-up, Wound Check       History of Present Illness    Mr Wild is a 55 y/o male who presents s/p left transmetatarsal amputation. DOS 4/19/17. He has developed a left plantar heel ulceration. At last visit we place him into a total contact cast.  He the heel wound remains painful.     Past Medical History:   Diagnosis Date   • Hypertension    • Stroke          Past Surgical History:   Procedure Laterality Date   • AMPUTATION DIGIT Left 4/19/2017    Procedure: LEFT FOOT TRANSMETATARSAL AMPUTATION, TENDO ACHILLES LENGTHENING.;  Surgeon: Estuardo Fried DPM;  Location: [facility];  Service:    • TOE AMPUTATION Right     All toes removed on right foot   • TOE AMPUTATION Left     great toe removed   • TONSILLECTOMY           No family history on file.      Social History     Social History   • Marital status:      Spouse name: N/A   • Number of children: N/A   • Years of education: N/A     Occupational History   • Not on file.     Social History Main Topics   • Smoking status: Current Some Day Smoker   • Smokeless tobacco: Not on file      Comment: one cigarette a month   • Alcohol use No   • Drug use: No   • Sexual activity: Defer     Other Topics Concern   • Not on file     Social History Narrative         Current Outpatient Prescriptions   Medication Sig Dispense Refill   • clindamycin (CLEOCIN) 300 MG capsule Take 1 capsule by mouth 3 (Three) Times a Day. 30 capsule 0   • HYDROcodone-acetaminophen (NORCO) 5-325 MG per tablet Take one  tablet every 4-6 hours prn pain 40 tablet 0   • lisinopril-hydrochlorothiazide (PRINZIDE,ZESTORETIC) 20-25 MG per tablet   1   • sulfamethoxazole-trimethoprim (BACTRIM DS) 800-160 MG per tablet Take 1 tablet by mouth 2 (Two) Times a Day. 20 tablet 0   • traMADol (ULTRAM) 50 MG tablet Take 1  "tablet by mouth Every 6 (Six) Hours As Needed for Moderate Pain (4-6) (pain). Take one to two every 4-6 hours prn pain. 40 tablet 0     No current facility-administered medications for this visit.          OBJECTIVE    Ht 70\" (177.8 cm)  Wt 213 lb (96.6 kg)  BMI 30.56 kg/m2      Review of Systems   Constitutional:  Denies recent weight loss, weight gain, fever or chills, no change in exercise tolerance  Musculoskeletal: Foot pain. H/o toe amputations   Skin:  Left foot incision  Neurological:  Burning sensations to feet b/l.  Psychiatric/Behavioral: Denies depression    Physical Exam   Constitutional: he appears well-developed and well-nourished.   HEENT: Normocephalic. Atraumatic.  CV: No CP. RRR  Resp: Non-labored respirations.  Psychiatric: he has a normal mood and affect. his behavior is normal.     Lower Extremity Exam:  Vascular: DP/PT pulses palpable 1+.   Negative hair growth.   2+ bilateral leg edema  Feel cool to touch  Neuro: Protective sensation absent, b/l.  DTRs intact  Integument:   Atrophic skin noted b/l  Central site of wound excision no healed  Left lateral 5th metatarsal base ulceration 0.3 x 0.4 x 0.2 cm.  No SOI  Full thickness ulceration to plantar left heel. Approx 2 x 2.2 x 0.2 cm. Mild drainage, no PTB.  Musculoskeletal: LE muscle strength 5/5.   Gait  normal  B/L TMA  Soft, not easily mobile approx 2 cc soft tissue mass noted to anterior lateral right lower leg      Left foot wound debridement:  Risks and benefits discussed.  Left lateral midfoot ulcer, plantar heel debrided of surrounding hyperkeratosis and devitalized tissue with 15 blade to level of subq  Pressure hemostasis obtained  Tolerated well        ASSESSMENT AND PLAN    Damian was seen today for follow-up and wound check.    Diagnoses and all orders for this visit:    Pressure ulcer, heel, left, stage III    S/P transmetatarsal amputation of foot, left    Foot ulcer, left, with fat layer exposed    -Comprehensive foot and " ankle exam performed  -Wound debridement as above  -Epifix applied, secured with steristrips  -TTC-EZ applied per  specifications  -Recheck 1 week            This document has been electronically signed by Estuardo Fried DPM on August 23, 2017 10:10 PM     Estuardo Fried DPM  8/23/2017  10:10 PM

## 2017-08-25 ENCOUNTER — OFFICE VISIT (OUTPATIENT)
Dept: PODIATRY | Facility: CLINIC | Age: 57
End: 2017-08-25

## 2017-08-25 VITALS — WEIGHT: 213 LBS | BODY MASS INDEX: 30.49 KG/M2 | HEIGHT: 70 IN

## 2017-08-25 DIAGNOSIS — Z89.432 S/P TRANSMETATARSAL AMPUTATION OF FOOT, LEFT (HCC): ICD-10-CM

## 2017-08-25 DIAGNOSIS — L89.623: Primary | ICD-10-CM

## 2017-08-25 DIAGNOSIS — L97.522 FOOT ULCER, LEFT, WITH FAT LAYER EXPOSED (HCC): ICD-10-CM

## 2017-08-25 PROCEDURE — 29445 APPL RIGID TOT CNTC LEG CAST: CPT | Performed by: PODIATRIST

## 2017-08-25 PROCEDURE — 11042 DBRDMT SUBQ TIS 1ST 20SQCM/<: CPT | Performed by: PODIATRIST

## 2017-09-01 ENCOUNTER — OFFICE VISIT (OUTPATIENT)
Dept: PODIATRY | Facility: CLINIC | Age: 57
End: 2017-09-01

## 2017-09-01 VITALS — HEIGHT: 70 IN | BODY MASS INDEX: 30.49 KG/M2 | WEIGHT: 213 LBS

## 2017-09-01 DIAGNOSIS — Z89.432 S/P TRANSMETATARSAL AMPUTATION OF FOOT, LEFT (HCC): ICD-10-CM

## 2017-09-01 DIAGNOSIS — L89.623: Primary | ICD-10-CM

## 2017-09-01 DIAGNOSIS — L97.522 FOOT ULCER, LEFT, WITH FAT LAYER EXPOSED (HCC): ICD-10-CM

## 2017-09-01 PROCEDURE — 29445 APPL RIGID TOT CNTC LEG CAST: CPT | Performed by: PODIATRIST

## 2017-09-01 PROCEDURE — 11042 DBRDMT SUBQ TIS 1ST 20SQCM/<: CPT | Performed by: PODIATRIST

## 2017-09-01 RX ORDER — SULFAMETHOXAZOLE AND TRIMETHOPRIM 800; 160 MG/1; MG/1
1 TABLET ORAL 2 TIMES DAILY
Qty: 20 TABLET | Refills: 0 | Status: SHIPPED | OUTPATIENT
Start: 2017-09-01 | End: 2017-11-27

## 2017-09-01 RX ORDER — HYDROCODONE BITARTRATE AND ACETAMINOPHEN 5; 325 MG/1; MG/1
TABLET ORAL
Qty: 40 TABLET | Refills: 0 | Status: SHIPPED | OUTPATIENT
Start: 2017-09-01 | End: 2017-09-28 | Stop reason: SDUPTHER

## 2017-09-01 NOTE — PROGRESS NOTES
Damian Wild  1960  56 y.o. male     Patient presents today for a recheck of the left foot.   09/01/2017      Chief Complaint   Patient presents with   • Left Foot - Follow-up, Wound Check       History of Present Illness    Mr Wild is a 57 y/o male who presents s/p left transmetatarsal amputation. DOS 4/19/17. He has developed a left plantar heel ulceration. At last visit we place him into a total contact cast.  He the heel wound remains painful.     Past Medical History:   Diagnosis Date   • Hypertension    • Stroke          Past Surgical History:   Procedure Laterality Date   • AMPUTATION DIGIT Left 4/19/2017    Procedure: LEFT FOOT TRANSMETATARSAL AMPUTATION, TENDO ACHILLES LENGTHENING.;  Surgeon: Estuardo Fried DPM;  Location: [facility];  Service:    • TOE AMPUTATION Right     All toes removed on right foot   • TOE AMPUTATION Left     great toe removed   • TONSILLECTOMY           No family history on file.      Social History     Social History   • Marital status:      Spouse name: N/A   • Number of children: N/A   • Years of education: N/A     Occupational History   • Not on file.     Social History Main Topics   • Smoking status: Current Some Day Smoker   • Smokeless tobacco: Not on file      Comment: one cigarette a month   • Alcohol use No   • Drug use: No   • Sexual activity: Defer     Other Topics Concern   • Not on file     Social History Narrative         Current Outpatient Prescriptions   Medication Sig Dispense Refill   • clindamycin (CLEOCIN) 300 MG capsule Take 1 capsule by mouth 3 (Three) Times a Day. 30 capsule 0   • HYDROcodone-acetaminophen (NORCO) 5-325 MG per tablet Take one  tablet every 4-6 hours prn pain 40 tablet 0   • lisinopril-hydrochlorothiazide (PRINZIDE,ZESTORETIC) 20-25 MG per tablet   1   • sulfamethoxazole-trimethoprim (BACTRIM DS) 800-160 MG per tablet Take 1 tablet by mouth 2 (Two) Times a Day. 20 tablet 0   • traMADol (ULTRAM) 50 MG tablet Take 1  "tablet by mouth Every 6 (Six) Hours As Needed for Moderate Pain (4-6) (pain). Take one to two every 4-6 hours prn pain. 40 tablet 0   • ciprofloxacin (CIPRO) 500 MG tablet Take 1 tablet by mouth 2 (Two) Times a Day. 20 tablet 0     No current facility-administered medications for this visit.          OBJECTIVE    Ht 70\" (177.8 cm)  Wt 213 lb (96.6 kg)  BMI 30.56 kg/m2      Review of Systems   Constitutional:  Denies recent weight loss, weight gain, fever or chills, no change in exercise tolerance  Musculoskeletal: Foot pain. H/o toe amputations   Skin:  Left foot incision  Neurological:  Burning sensations to feet b/l.  Psychiatric/Behavioral: Denies depression    Physical Exam   Constitutional: he appears well-developed and well-nourished.   HEENT: Normocephalic. Atraumatic.  CV: No CP. RRR  Resp: Non-labored respirations.  Psychiatric: he has a normal mood and affect. his behavior is normal.     Lower Extremity Exam:  Vascular: DP/PT pulses palpable 1+.   Negative hair growth.   2+ bilateral leg edema  Feel cool to touch  Neuro: Protective sensation absent, b/l.  DTRs intact  Integument:   Atrophic skin noted b/l  Central site of wound excision no healed  Left lateral 5th metatarsal base ulceration 0.8 x 0.3 x 0.1 cm.  No SOI  Full thickness ulceration to plantar left heel. Approx 2.0 x 2.9 x 0.2 cm. Mild drainage, no PTB. More granular today. Mild surrounding erythema.  Musculoskeletal: LE muscle strength 5/5.   Gait  normal  B/L TMA      Left foot wound debridement:  Risks and benefits discussed.  Left lateral midfoot ulcer, plantar heel debrided of surrounding hyperkeratosis and devitalized tissue with 15 blade to level of subq  Pressure hemostasis obtained  Tolerated well        ASSESSMENT AND PLAN    Damian was seen today for follow-up and wound check.    Diagnoses and all orders for this visit:    Pressure ulcer, heel, left, stage III    Foot ulcer, left, with fat layer exposed    S/P transmetatarsal " amputation of foot, left    Other orders  -     sulfamethoxazole-trimethoprim (BACTRIM DS) 800-160 MG per tablet; Take 1 tablet by mouth 2 (Two) Times a Day.  -     HYDROcodone-acetaminophen (NORCO) 5-325 MG per tablet; Take one  tablet every 4-6 hours prn pain    -Comprehensive foot and ankle exam performed  -Wound debridement as above  -TTC-EZ applied per  specifications  -Recheck 1 week            This document has been electronically signed by Estuardo Fried DPM on September 17, 2017 9:55 PM     Estuardo Fried DPM  9/17/2017  9:55 PM

## 2017-09-07 ENCOUNTER — OFFICE VISIT (OUTPATIENT)
Dept: PODIATRY | Facility: CLINIC | Age: 57
End: 2017-09-07

## 2017-09-07 VITALS — HEIGHT: 70 IN | BODY MASS INDEX: 30.49 KG/M2 | WEIGHT: 213 LBS

## 2017-09-07 DIAGNOSIS — Z89.432 S/P TRANSMETATARSAL AMPUTATION OF FOOT, LEFT (HCC): ICD-10-CM

## 2017-09-07 DIAGNOSIS — L89.623: Primary | ICD-10-CM

## 2017-09-07 DIAGNOSIS — L97.522 FOOT ULCER, LEFT, WITH FAT LAYER EXPOSED (HCC): ICD-10-CM

## 2017-09-07 PROCEDURE — 29445 APPL RIGID TOT CNTC LEG CAST: CPT | Performed by: PODIATRIST

## 2017-09-07 PROCEDURE — 11042 DBRDMT SUBQ TIS 1ST 20SQCM/<: CPT | Performed by: PODIATRIST

## 2017-09-07 RX ORDER — CIPROFLOXACIN 500 MG/1
500 TABLET, FILM COATED ORAL 2 TIMES DAILY
Qty: 20 TABLET | Refills: 0 | Status: SHIPPED | OUTPATIENT
Start: 2017-09-07 | End: 2017-09-28 | Stop reason: SDUPTHER

## 2017-09-07 NOTE — PROGRESS NOTES
Damian Wild  1960  56 y.o. male     Patient presents today for a recheck of the left foot.   09/07/2017      Chief Complaint   Patient presents with   • Left Foot - Follow-up, Wound Check       History of Present Illness    Mr Wild is a 57 y/o male who presents s/p left transmetatarsal amputation. DOS 4/19/17. He has developed a left plantar heel ulceration. At last visit we place him into a total contact cast.  States the heel wound remains painful, but no issues otherwise.     Past Medical History:   Diagnosis Date   • Hypertension    • Stroke          Past Surgical History:   Procedure Laterality Date   • AMPUTATION DIGIT Left 4/19/2017    Procedure: LEFT FOOT TRANSMETATARSAL AMPUTATION, TENDO ACHILLES LENGTHENING.;  Surgeon: Estuardo Fried DPM;  Location: [facility];  Service:    • TOE AMPUTATION Right     All toes removed on right foot   • TOE AMPUTATION Left     great toe removed   • TONSILLECTOMY           No family history on file.      Social History     Social History   • Marital status:      Spouse name: N/A   • Number of children: N/A   • Years of education: N/A     Occupational History   • Not on file.     Social History Main Topics   • Smoking status: Current Some Day Smoker   • Smokeless tobacco: Not on file      Comment: one cigarette a month   • Alcohol use No   • Drug use: No   • Sexual activity: Defer     Other Topics Concern   • Not on file     Social History Narrative         Current Outpatient Prescriptions   Medication Sig Dispense Refill   • clindamycin (CLEOCIN) 300 MG capsule Take 1 capsule by mouth 3 (Three) Times a Day. 30 capsule 0   • HYDROcodone-acetaminophen (NORCO) 5-325 MG per tablet Take one  tablet every 4-6 hours prn pain 40 tablet 0   • lisinopril-hydrochlorothiazide (PRINZIDE,ZESTORETIC) 20-25 MG per tablet   1   • sulfamethoxazole-trimethoprim (BACTRIM DS) 800-160 MG per tablet Take 1 tablet by mouth 2 (Two) Times a Day. 20 tablet 0   • traMADol  "(ULTRAM) 50 MG tablet Take 1 tablet by mouth Every 6 (Six) Hours As Needed for Moderate Pain (4-6) (pain). Take one to two every 4-6 hours prn pain. 40 tablet 0   • ciprofloxacin (CIPRO) 500 MG tablet Take 1 tablet by mouth 2 (Two) Times a Day. 20 tablet 0     No current facility-administered medications for this visit.          OBJECTIVE    Ht 70\" (177.8 cm)  Wt 213 lb (96.6 kg)  BMI 30.56 kg/m2      Review of Systems   Constitutional:  Denies recent weight loss, weight gain, fever or chills, no change in exercise tolerance  Musculoskeletal: Foot pain. H/o toe amputations   Skin:  Left foot incision  Neurological:  Burning sensations to feet b/l.  Psychiatric/Behavioral: Denies depression    Physical Exam   Constitutional: he appears well-developed and well-nourished.   HEENT: Normocephalic. Atraumatic.  CV: No CP. RRR  Resp: Non-labored respirations.  Psychiatric: he has a normal mood and affect. his behavior is normal.     Lower Extremity Exam:  Vascular: DP/PT pulses palpable 1+.   Negative hair growth.   2+ bilateral leg edema  Feel cool to touch  Neuro: Protective sensation absent, b/l.  DTRs intact  Integument:   Atrophic skin noted b/l  Central site of wound excision no healed  Left lateral 5th metatarsal base ulceration 0.3 x 0.4 x 0.2 cm.  No SOI  Full thickness ulceration to plantar left heel. Approx 2 x 2.5 x 0.2 cm. Mild drainage, no PTB. More granular today  Musculoskeletal: LE muscle strength 5/5.   Gait  normal  B/L TMA  Soft, not easily mobile approx 2 cc soft tissue mass noted to anterior lateral right lower leg      Left foot wound debridement:  Risks and benefits discussed.  Left lateral midfoot ulcer, plantar heel debrided of surrounding hyperkeratosis and devitalized tissue with 15 blade to level of subq  Pressure hemostasis obtained  Tolerated well        ASSESSMENT AND PLAN    Damian was seen today for follow-up and wound check.    Diagnoses and all orders for this visit:    Pressure ulcer, " heel, left, stage III    Foot ulcer, left, with fat layer exposed    S/P transmetatarsal amputation of foot, left    Other orders  -     ciprofloxacin (CIPRO) 500 MG tablet; Take 1 tablet by mouth 2 (Two) Times a Day.    -Comprehensive foot and ankle exam performed  -Wound debridement as above  -TTC-EZ applied per  specifications  -Mild greenish drainage noted on the dressing today, will start on Cipro for possible pseudomonas coverage.  -Recheck 1 week with Dr. To            This document has been electronically signed by Estuardo Fried DPM on September 22, 2017 5:00 PM     Estuardo Fried DPM  9/22/2017  5:00 PM

## 2017-09-13 ENCOUNTER — OFFICE VISIT (OUTPATIENT)
Dept: PODIATRY | Facility: CLINIC | Age: 57
End: 2017-09-13

## 2017-09-13 VITALS — HEIGHT: 70 IN | BODY MASS INDEX: 30.49 KG/M2 | WEIGHT: 213 LBS

## 2017-09-13 DIAGNOSIS — L89.623: Primary | ICD-10-CM

## 2017-09-13 PROCEDURE — 29445 APPL RIGID TOT CNTC LEG CAST: CPT | Performed by: PODIATRIST

## 2017-09-13 PROCEDURE — 15275 SKIN SUB GRAFT FACE/NK/HF/G: CPT | Performed by: PODIATRIST

## 2017-09-13 NOTE — PROGRESS NOTES
Damian Wild  1960  56 y.o. male       9/13/17      Chief Complaint   Patient presents with   • Left Foot - Follow-up, Wound Check       History of Present Illness    Mr Wild is a 57 y/o male who presents s/p left transmetatarsal amputation. DOS 4/19/17. He continues to have a wound to the bottom of his heel.  He has been placed in a total contact cast the last couple visits.  His heel is still painful.    Past Medical History:   Diagnosis Date   • Hypertension    • Stroke          Past Surgical History:   Procedure Laterality Date   • AMPUTATION DIGIT Left 4/19/2017    Procedure: LEFT FOOT TRANSMETATARSAL AMPUTATION, TENDO ACHILLES LENGTHENING.;  Surgeon: Estuardo Fried DPM;  Location: [facility];  Service:    • TOE AMPUTATION Right     All toes removed on right foot   • TOE AMPUTATION Left     great toe removed   • TONSILLECTOMY           No family history on file.      Social History     Social History   • Marital status:      Spouse name: N/A   • Number of children: N/A   • Years of education: N/A     Occupational History   • Not on file.     Social History Main Topics   • Smoking status: Current Some Day Smoker   • Smokeless tobacco: Not on file      Comment: one cigarette a month   • Alcohol use No   • Drug use: No   • Sexual activity: Defer     Other Topics Concern   • Not on file     Social History Narrative         Current Outpatient Prescriptions   Medication Sig Dispense Refill   • ciprofloxacin (CIPRO) 500 MG tablet Take 1 tablet by mouth 2 (Two) Times a Day. 20 tablet 0   • clindamycin (CLEOCIN) 300 MG capsule Take 1 capsule by mouth 3 (Three) Times a Day. 30 capsule 0   • HYDROcodone-acetaminophen (NORCO) 5-325 MG per tablet Take one  tablet every 4-6 hours prn pain 40 tablet 0   • lisinopril-hydrochlorothiazide (PRINZIDE,ZESTORETIC) 20-25 MG per tablet   1   • sulfamethoxazole-trimethoprim (BACTRIM DS) 800-160 MG per tablet Take 1 tablet by mouth 2 (Two) Times a Day. 20  "tablet 0   • traMADol (ULTRAM) 50 MG tablet Take 1 tablet by mouth Every 6 (Six) Hours As Needed for Moderate Pain (4-6) (pain). Take one to two every 4-6 hours prn pain. 40 tablet 0     No current facility-administered medications for this visit.          OBJECTIVE    Ht 70\" (177.8 cm)  Wt 213 lb (96.6 kg)  BMI 30.56 kg/m2      Review of Systems   Constitutional: Negative for chills and fever.   Cardiovascular: Negative for chest pain.   Gastrointestinal: Negative for constipation, diarrhea, nausea and vomiting.   Skin: ulcer left heel  Musculoskeletal: left foot pain        Constitutional: well developed, well nourished    HEENT: Normocephalic and atraumatic, normal hearing    Respiratory: Non labored respirations noted    LLE Exam:    Cardiovascular:    DP/PT pulses palpable    Pedal hair growth absent.   No erythema or edema noted     Musculoskeletal:  TMA noted  Muscle strength 5/5     Dermatological:   Full-thickness ulceration noted to the plantar aspect of the heel.  Ulcer measures 3.0 x 2.5 x 0.3 cm.  Base is granular.  Small hyperkeratotic border noted.  No surrounding erythema, foul odor or purulent drainage noted.    Neurological:   Protective sensation intact    Sensation intact to light touch        Psychiatric: A&O x 3 with normal mood and affect. NAD.       ASSESSMENT AND PLAN    Damian was seen today for follow-up and wound check.    Diagnoses and all orders for this visit:    Pressure ulcer, heel, left, stage III    - Ulcer noted in objective was debrided down to subcutaneous tissue.  Post-debridement measurements are 3.0 x 2.5 x 0.3 cm.  - Applied epifix mesh graft  - Apply total contact cast  - All questions were answered  - Follow-up with Dr. Fried in 1 week            This document has been electronically signed by Brian To DPM on September 15, 2017 12:03 PM             "

## 2017-09-21 ENCOUNTER — OFFICE VISIT (OUTPATIENT)
Dept: PODIATRY | Facility: CLINIC | Age: 57
End: 2017-09-21

## 2017-09-21 VITALS — HEIGHT: 70 IN | BODY MASS INDEX: 30.49 KG/M2 | WEIGHT: 213 LBS

## 2017-09-21 DIAGNOSIS — G62.9 POLYNEUROPATHY: ICD-10-CM

## 2017-09-21 DIAGNOSIS — L89.623: Primary | ICD-10-CM

## 2017-09-21 DIAGNOSIS — L89.623 DECUBITUS ULCER OF LEFT HEEL, STAGE 3 (HCC): ICD-10-CM

## 2017-09-21 DIAGNOSIS — Z89.432 S/P TRANSMETATARSAL AMPUTATION OF FOOT, LEFT (HCC): ICD-10-CM

## 2017-09-21 PROCEDURE — 11042 DBRDMT SUBQ TIS 1ST 20SQCM/<: CPT | Performed by: PODIATRIST

## 2017-09-26 ENCOUNTER — APPOINTMENT (OUTPATIENT)
Dept: MRI IMAGING | Facility: HOSPITAL | Age: 57
End: 2017-09-26

## 2017-09-27 ENCOUNTER — TELEPHONE (OUTPATIENT)
Dept: GENERAL RADIOLOGY | Facility: HOSPITAL | Age: 57
End: 2017-09-27

## 2017-09-28 ENCOUNTER — OFFICE VISIT (OUTPATIENT)
Dept: PODIATRY | Facility: CLINIC | Age: 57
End: 2017-09-28

## 2017-09-28 ENCOUNTER — HOSPITAL ENCOUNTER (OUTPATIENT)
Dept: MRI IMAGING | Facility: HOSPITAL | Age: 57
Discharge: HOME | End: 2017-09-28
Admitting: PODIATRIST

## 2017-09-28 VITALS — BODY MASS INDEX: 30.49 KG/M2 | WEIGHT: 213 LBS | HEIGHT: 70 IN

## 2017-09-28 DIAGNOSIS — L89.623: ICD-10-CM

## 2017-09-28 DIAGNOSIS — Z89.432 S/P TRANSMETATARSAL AMPUTATION OF FOOT, LEFT (HCC): ICD-10-CM

## 2017-09-28 DIAGNOSIS — L97.522 FOOT ULCER, LEFT, WITH FAT LAYER EXPOSED (HCC): Primary | ICD-10-CM

## 2017-09-28 DIAGNOSIS — L89.623 DECUBITUS ULCER OF LEFT HEEL, STAGE 3 (HCC): ICD-10-CM

## 2017-09-28 PROCEDURE — A9576 INJ PROHANCE MULTIPACK: HCPCS | Performed by: PODIATRIST

## 2017-09-28 PROCEDURE — 29445 APPL RIGID TOT CNTC LEG CAST: CPT | Performed by: PODIATRIST

## 2017-09-28 PROCEDURE — 73723 MRI JOINT LWR EXTR W/O&W/DYE: CPT

## 2017-09-28 PROCEDURE — 25010000002 GADOTERIDOL PER 1 ML: Performed by: PODIATRIST

## 2017-09-28 RX ORDER — CIPROFLOXACIN 500 MG/1
500 TABLET, FILM COATED ORAL 2 TIMES DAILY
Qty: 20 TABLET | Refills: 0 | Status: SHIPPED | OUTPATIENT
Start: 2017-09-28 | End: 2017-10-19 | Stop reason: SDUPTHER

## 2017-09-28 RX ORDER — HYDROCODONE BITARTRATE AND ACETAMINOPHEN 5; 325 MG/1; MG/1
TABLET ORAL
Qty: 40 TABLET | Refills: 0 | Status: SHIPPED | OUTPATIENT
Start: 2017-09-28 | End: 2017-10-06

## 2017-09-28 RX ORDER — CLINDAMYCIN HYDROCHLORIDE 300 MG/1
300 CAPSULE ORAL 3 TIMES DAILY
Qty: 30 CAPSULE | Refills: 0 | Status: SHIPPED | OUTPATIENT
Start: 2017-09-28 | End: 2017-10-19 | Stop reason: SDUPTHER

## 2017-09-28 RX ADMIN — GADOTERIDOL 20 ML: 279.3 INJECTION, SOLUTION INTRAVENOUS at 12:51

## 2017-10-06 ENCOUNTER — OFFICE VISIT (OUTPATIENT)
Dept: PODIATRY | Facility: CLINIC | Age: 57
End: 2017-10-06

## 2017-10-06 VITALS — HEIGHT: 70 IN | WEIGHT: 213 LBS | BODY MASS INDEX: 30.49 KG/M2

## 2017-10-06 DIAGNOSIS — L89.623 DECUBITUS ULCER OF LEFT HEEL, STAGE 3 (HCC): ICD-10-CM

## 2017-10-06 DIAGNOSIS — Z89.432 S/P TRANSMETATARSAL AMPUTATION OF FOOT, LEFT (HCC): ICD-10-CM

## 2017-10-06 DIAGNOSIS — L97.522 FOOT ULCER, LEFT, WITH FAT LAYER EXPOSED (HCC): Primary | ICD-10-CM

## 2017-10-06 PROCEDURE — 11100 PR BIOPSY OF SKIN LESION: CPT | Performed by: PODIATRIST

## 2017-10-06 PROCEDURE — 29445 APPL RIGID TOT CNTC LEG CAST: CPT | Performed by: PODIATRIST

## 2017-10-06 PROCEDURE — 11101 PR BIOPSY, EACH ADDED LESION: CPT | Performed by: PODIATRIST

## 2017-10-06 RX ORDER — HYDROCODONE BITARTRATE AND ACETAMINOPHEN 10; 325 MG/1; MG/1
1 TABLET ORAL EVERY 6 HOURS PRN
Qty: 40 TABLET | Refills: 0 | Status: SHIPPED | OUTPATIENT
Start: 2017-10-06 | End: 2017-10-19 | Stop reason: SDUPTHER

## 2017-10-06 NOTE — PROGRESS NOTES
Damian Wild  1960  56 y.o. male     Patient presents today for a recheck of the left foot.   10/06/2017        Chief Complaint   Patient presents with   • Left Foot - Follow-up       History of Present Illness    Mr Wild is a 57 y/o male who presents s/p left transmetatarsal amputation. DOS 4/19/17. He has subsequently developed a left heel and forefoot ulceration.  We have had him in a total contact cast which she is tolerating well.  He does continue to complain of pain however.    Past Medical History:   Diagnosis Date   • Hypertension    • Stroke          Past Surgical History:   Procedure Laterality Date   • AMPUTATION DIGIT Left 4/19/2017    Procedure: LEFT FOOT TRANSMETATARSAL AMPUTATION, TENDO ACHILLES LENGTHENING.;  Surgeon: Estuardo Fried DPM;  Location[facility];  Service:    • FOOT SURGERY     • TOE AMPUTATION Right     All toes removed on right foot   • TOE AMPUTATION Left     great toe removed   • TONSILLECTOMY           Family History   Problem Relation Age of Onset   • Family history unknown: Yes         Social History     Social History   • Marital status:      Spouse name: N/A   • Number of children: N/A   • Years of education: N/A     Occupational History   • Not on file.     Social History Main Topics   • Smoking status: Current Some Day Smoker   • Smokeless tobacco: Never Used      Comment: one cigarette a month   • Alcohol use No   • Drug use: No   • Sexual activity: Defer     Other Topics Concern   • Not on file     Social History Narrative         Current Outpatient Prescriptions   Medication Sig Dispense Refill   • lisinopril-hydrochlorothiazide (PRINZIDE,ZESTORETIC) 20-25 MG per tablet   1   • sulfamethoxazole-trimethoprim (BACTRIM DS) 800-160 MG per tablet Take 1 tablet by mouth 2 (Two) Times a Day. 20 tablet 0   • ciprofloxacin (CIPRO) 500 MG tablet Take 1 tablet by mouth 2 (Two) Times a Day. 20 tablet 0   • clindamycin (CLEOCIN) 300 MG capsule Take 1 capsule  "by mouth 3 (Three) Times a Day. 30 capsule 0   • HYDROcodone-acetaminophen (NORCO)  MG per tablet Take 1 tablet by mouth Every 6 (Six) Hours As Needed for Moderate Pain . 40 tablet 0     No current facility-administered medications for this visit.          OBJECTIVE    Ht 70\" (177.8 cm)  Wt 213 lb (96.6 kg)  BMI 30.56 kg/m2      Review of Systems   Constitutional:  Denies recent weight loss, weight gain, fever or chills, no change in exercise tolerance  Musculoskeletal: Foot pain. H/o toe amputations   Skin:  Left foot incision  Neurological:  Burning sensations to feet b/l.  Psychiatric/Behavioral: Denies depression     Physical Exam   Constitutional: he appears well-developed and well-nourished.   HEENT: Normocephalic. Atraumatic.  CV: No CP. RRR  Resp: Non-labored respirations.  Psychiatric: he has a normal mood and affect. his behavior is normal.      Lower Extremity Exam:  Vascular: DP/PT pulses palpable 1+.   Negative hair growth.   2+ bilateral leg edema  Feel cool to touch  Neuro: Protective sensation absent, b/l.  DTRs intact  Integument:   Atrophic skin noted b/l  Full thickness ulceration to plantar left heel. Approx 2.5 x 2.2 x 0.2 cm. Mild drainage, no PTB.  Ulceration to sub-first metatarsal with overlying bulla formation approximately 2 x 2 centimeters.  Surrounding erythema noted.  No probe to bone no fluctuance.  Both ulcerations postop appearance with erythematous rim noted.  Musculoskeletal: LE muscle strength 5/5.   Gait  normal  B/L TMA    Left skin biopsy:  Risks, benefits discussed. Written consent obtained.  Local area anesthetized with 2 % lidocaine plain  Two 3-mm punch biopsies taken of left heel wound  Samples sent to [facility]  Pt tolerated well        ASSESSMENT AND PLAN    Damian was seen today for follow-up.    Diagnoses and all orders for this visit:    Foot ulcer, left, with fat layer exposed    Decubitus ulcer of left heel, stage 3    S/P transmetatarsal amputation of foot, " left    Other orders  -     Discontinue: HYDROcodone-acetaminophen (NORCO)  MG per tablet; Take 1 tablet by mouth Every 6 (Six) Hours As Needed for Moderate Pain .    -Comprehensive foot and ankle exam performed  -Punch biopsy of left heel ulceration as above to rule out malignancy, possible pyoderma gangrenosum as ulceration has been relatively unresponsive to offloading and serial debridements  -TTC-EZ applied per  specifications  -Recheck 1 week            This document has been electronically signed by Estuardo Fried DPM on October 20, 2017 3:08 PM     Estuardo Fried DPM  10/20/2017  3:08 PM

## 2017-10-19 ENCOUNTER — OFFICE VISIT (OUTPATIENT)
Dept: PODIATRY | Facility: CLINIC | Age: 57
End: 2017-10-19

## 2017-10-19 VITALS — HEIGHT: 70 IN | WEIGHT: 213 LBS | BODY MASS INDEX: 30.49 KG/M2

## 2017-10-19 DIAGNOSIS — L89.623 DECUBITUS ULCER OF LEFT HEEL, STAGE 3 (HCC): ICD-10-CM

## 2017-10-19 DIAGNOSIS — L97.522 FOOT ULCER, LEFT, WITH FAT LAYER EXPOSED (HCC): Primary | ICD-10-CM

## 2017-10-19 DIAGNOSIS — Z89.432 S/P TRANSMETATARSAL AMPUTATION OF FOOT, LEFT (HCC): ICD-10-CM

## 2017-10-19 PROCEDURE — 29445 APPL RIGID TOT CNTC LEG CAST: CPT | Performed by: PODIATRIST

## 2017-10-19 RX ORDER — CIPROFLOXACIN 500 MG/1
500 TABLET, FILM COATED ORAL 2 TIMES DAILY
Qty: 20 TABLET | Refills: 0 | Status: SHIPPED | OUTPATIENT
Start: 2017-10-19 | End: 2017-11-13 | Stop reason: SDUPTHER

## 2017-10-19 RX ORDER — CLINDAMYCIN HYDROCHLORIDE 300 MG/1
300 CAPSULE ORAL 3 TIMES DAILY
Qty: 30 CAPSULE | Refills: 0 | Status: SHIPPED | OUTPATIENT
Start: 2017-10-19 | End: 2017-11-13 | Stop reason: SDUPTHER

## 2017-10-19 RX ORDER — HYDROCODONE BITARTRATE AND ACETAMINOPHEN 10; 325 MG/1; MG/1
1 TABLET ORAL EVERY 6 HOURS PRN
Qty: 40 TABLET | Refills: 0 | Status: SHIPPED | OUTPATIENT
Start: 2017-10-19 | End: 2017-11-09 | Stop reason: SDUPTHER

## 2017-10-19 NOTE — PROGRESS NOTES
Damian Wild  1960  56 y.o. male     Patient presents today for a recheck of the left foot.   10/06/2017        Chief Complaint   Patient presents with   • Right Foot - Foot Ulcer, Follow-up   • Left Foot - Wound Check       History of Present Illness    Mr Wild is a 55 y/o male who presents s/p left transmetatarsal amputation. DOS 4/19/17. He has subsequently developed a left heel and forefoot ulceration.  We have had him in a total contact cast which she is tolerating well.  He does continue to complain of pain however.    Past Medical History:   Diagnosis Date   • Hypertension    • Stroke          Past Surgical History:   Procedure Laterality Date   • AMPUTATION DIGIT Left 4/19/2017    Procedure: LEFT FOOT TRANSMETATARSAL AMPUTATION, TENDO ACHILLES LENGTHENING.;  Surgeon: Estuardo rFied DPM;  Location: [facility];  Service:    • FOOT SURGERY     • TOE AMPUTATION Right     All toes removed on right foot   • TOE AMPUTATION Left     great toe removed   • TONSILLECTOMY           Family History   Problem Relation Age of Onset   • Family history unknown: Yes         Social History     Social History   • Marital status:      Spouse name: N/A   • Number of children: N/A   • Years of education: N/A     Occupational History   • Not on file.     Social History Main Topics   • Smoking status: Current Some Day Smoker   • Smokeless tobacco: Never Used      Comment: one cigarette a month   • Alcohol use No   • Drug use: No   • Sexual activity: Defer     Other Topics Concern   • Not on file     Social History Narrative         Current Outpatient Prescriptions   Medication Sig Dispense Refill   • ciprofloxacin (CIPRO) 500 MG tablet Take 1 tablet by mouth 2 (Two) Times a Day. 20 tablet 0   • clindamycin (CLEOCIN) 300 MG capsule Take 1 capsule by mouth 3 (Three) Times a Day. 30 capsule 0   • HYDROcodone-acetaminophen (NORCO)  MG per tablet Take 1 tablet by mouth Every 6 (Six) Hours As Needed for  "Moderate Pain . 40 tablet 0   • lisinopril-hydrochlorothiazide (PRINZIDE,ZESTORETIC) 20-25 MG per tablet   1   • sulfamethoxazole-trimethoprim (BACTRIM DS) 800-160 MG per tablet Take 1 tablet by mouth 2 (Two) Times a Day. 20 tablet 0     No current facility-administered medications for this visit.          OBJECTIVE    Ht 70\" (177.8 cm)  Wt 213 lb (96.6 kg)  BMI 30.56 kg/m2      Review of Systems   Constitutional:  Denies recent weight loss, weight gain, fever or chills, no change in exercise tolerance  Musculoskeletal: Foot pain. H/o toe amputations   Skin:  Left foot incision  Neurological:  Burning sensations to feet b/l.  Psychiatric/Behavioral: Denies depression     Physical Exam   Constitutional: he appears well-developed and well-nourished.   HEENT: Normocephalic. Atraumatic.  CV: No CP. RRR  Resp: Non-labored respirations.  Psychiatric: he has a normal mood and affect. his behavior is normal.      Lower Extremity Exam:  Vascular: DP/PT pulses palpable 1+.   Negative hair growth.   2+ bilateral leg edema  Feel cool to touch  Neuro: Protective sensation absent, b/l.  DTRs intact  Integument:   Atrophic skin noted b/l  Full thickness ulceration to plantar left heel. Approx 2.5 x 2.0 x 0.2 cm. Mild drainage, no PTB.  Ulceration to sub-first metatarsal  approximately 2.5 x 2.5 centimeters.  Surrounding erythema noted.  No probe to bone no fluctuance.  Both ulcerations postop appearance with erythematous rim noted.  Musculoskeletal: LE muscle strength 5/5.   Gait  normal  B/L TMA            ASSESSMENT AND PLAN    Damian was seen today for foot ulcer, follow-up and wound check.    Diagnoses and all orders for this visit:    Foot ulcer, left, with fat layer exposed    Decubitus ulcer of left heel, stage 3    S/P transmetatarsal amputation of foot, left    Other orders  -     ciprofloxacin (CIPRO) 500 MG tablet; Take 1 tablet by mouth 2 (Two) Times a Day.  -     clindamycin (CLEOCIN) 300 MG capsule; Take 1 capsule " by mouth 3 (Three) Times a Day.  -     HYDROcodone-acetaminophen (NORCO)  MG per tablet; Take 1 tablet by mouth Every 6 (Six) Hours As Needed for Moderate Pain .      -Comprehensive foot and ankle exam performed  -TTC-EZ applied per  specifications  -Punch biopsy results reviewed, no malignancy identified.  -Recheck 1 week            This document has been electronically signed by Estuardo Fried DPM on October 26, 2017 6:18 PM     Estuardo Fried DPM  10/26/2017  6:18 PM

## 2017-10-26 ENCOUNTER — OFFICE VISIT (OUTPATIENT)
Dept: PODIATRY | Facility: CLINIC | Age: 57
End: 2017-10-26

## 2017-10-26 VITALS — WEIGHT: 213 LBS | HEIGHT: 70 IN | BODY MASS INDEX: 30.49 KG/M2

## 2017-10-26 DIAGNOSIS — Z89.432 S/P TRANSMETATARSAL AMPUTATION OF FOOT, LEFT (HCC): ICD-10-CM

## 2017-10-26 DIAGNOSIS — L97.522 FOOT ULCER, LEFT, WITH FAT LAYER EXPOSED (HCC): ICD-10-CM

## 2017-10-26 DIAGNOSIS — M79.672 FOOT PAIN, LEFT: ICD-10-CM

## 2017-10-26 DIAGNOSIS — L89.623 DECUBITUS ULCER OF LEFT HEEL, STAGE 3 (HCC): Primary | ICD-10-CM

## 2017-10-26 PROCEDURE — 11042 DBRDMT SUBQ TIS 1ST 20SQCM/<: CPT | Performed by: PODIATRIST

## 2017-11-06 NOTE — PROGRESS NOTES
Damian Wild  1960  56 y.o. male     Patient presents today for a recheck of the left foot.   10/26/2017          Chief Complaint   Patient presents with   • Left Foot - Follow-up, Wound Check       History of Present Illness    Mr Wild is a 57 y/o male who presents s/p left transmetatarsal amputation. DOS 4/19/17. He has subsequently developed a left heel and forefoot ulceration.  We have had him in a total contact cast , he did experience some leg swelling and states the cast has become quite painful.    Past Medical History:   Diagnosis Date   • Hypertension    • Stroke          Past Surgical History:   Procedure Laterality Date   • AMPUTATION DIGIT Left 4/19/2017    Procedure: LEFT FOOT TRANSMETATARSAL AMPUTATION, TENDO ACHILLES LENGTHENING.;  Surgeon: Estuardo Fried DPM;  Location: [facility];  Service:    • FOOT SURGERY     • TOE AMPUTATION Right     All toes removed on right foot   • TOE AMPUTATION Left     great toe removed   • TONSILLECTOMY           Family History   Problem Relation Age of Onset   • Family history unknown: Yes         Social History     Social History   • Marital status:      Spouse name: N/A   • Number of children: N/A   • Years of education: N/A     Occupational History   • Not on file.     Social History Main Topics   • Smoking status: Current Some Day Smoker   • Smokeless tobacco: Never Used      Comment: one cigarette a month   • Alcohol use No   • Drug use: No   • Sexual activity: Defer     Other Topics Concern   • Not on file     Social History Narrative         Current Outpatient Prescriptions   Medication Sig Dispense Refill   • ciprofloxacin (CIPRO) 500 MG tablet Take 1 tablet by mouth 2 (Two) Times a Day. 20 tablet 0   • clindamycin (CLEOCIN) 300 MG capsule Take 1 capsule by mouth 3 (Three) Times a Day. 30 capsule 0   • HYDROcodone-acetaminophen (NORCO)  MG per tablet Take 1 tablet by mouth Every 6 (Six) Hours As Needed for Moderate Pain . 40  "tablet 0   • lisinopril-hydrochlorothiazide (PRINZIDE,ZESTORETIC) 20-25 MG per tablet   1   • sulfamethoxazole-trimethoprim (BACTRIM DS) 800-160 MG per tablet Take 1 tablet by mouth 2 (Two) Times a Day. 20 tablet 0     No current facility-administered medications for this visit.          OBJECTIVE    Ht 70\" (177.8 cm)  Wt 213 lb (96.6 kg)  BMI 30.56 kg/m2      Review of Systems   Constitutional:  Denies recent weight loss, weight gain, fever or chills, no change in exercise tolerance  Musculoskeletal: Foot pain. H/o toe amputations   Skin:  Left foot incision  Neurological:  Burning sensations to feet b/l.  Psychiatric/Behavioral: Denies depression     Physical Exam   Constitutional: he appears well-developed and well-nourished.   HEENT: Normocephalic. Atraumatic.  CV: No CP. RRR  Resp: Non-labored respirations.  Psychiatric: he has a normal mood and affect. his behavior is normal.      Lower Extremity Exam:  Vascular: DP/PT pulses palpable 1+.   Negative hair growth.   2+ bilateral leg edema  Negative Jah  Feel cool to touch  Neuro: Protective sensation absent, b/l.  DTRs intact  Integument:   Atrophic skin noted b/l  Full thickness ulceration to plantar left heel. Approx 2.5 x 2.0 x 0.2 cm. Mild drainage, no PTB.  Ulceration to sub-first metatarsal  approximately 2.5 x 2.5 centimeters.  Surrounding erythema noted.  No probe to bone no fluctuance.  Both ulcerations postop appearance with erythematous rim noted.  Musculoskeletal: LE muscle strength 5/5.   Gait  normal  B/L TMA    Left foot wound debridement:  Risks and benefits discussed.  Left plantar heel debrided of surrounding hyperkeratosis and devitalized tissue with 15 blade to level of subq  Pressure hemostasis obtained  Tolerated well        ASSESSMENT AND PLAN    Damian was seen today for follow-up and wound check.    Diagnoses and all orders for this visit:    Decubitus ulcer of left heel, stage 3    Foot pain, left  -     XR Foot 3+ View Left    Foot " ulcer, left, with fat layer exposed    S/P transmetatarsal amputation of foot, left      -Comprehensive foot and ankle exam performed  -Wound debridement as above  -Will give TCC holiday given increased pain. Surgical shoe with offloading cutouts fabricated, to be worn at all times  -Recheck 1 week, possible theraskin application            This document has been electronically signed by Estuardo Fried DPM on November 5, 2017 8:30 PM     Estuardo Fried DPM  11/5/2017  8:30 PM

## 2017-11-09 ENCOUNTER — OFFICE VISIT (OUTPATIENT)
Dept: PODIATRY | Facility: CLINIC | Age: 57
End: 2017-11-09

## 2017-11-09 VITALS
HEART RATE: 80 BPM | BODY MASS INDEX: 30.49 KG/M2 | TEMPERATURE: 98.9 F | OXYGEN SATURATION: 95 % | SYSTOLIC BLOOD PRESSURE: 203 MMHG | DIASTOLIC BLOOD PRESSURE: 92 MMHG | HEIGHT: 70 IN | WEIGHT: 213 LBS

## 2017-11-09 DIAGNOSIS — M79.672 FOOT PAIN, LEFT: ICD-10-CM

## 2017-11-09 DIAGNOSIS — L97.522 FOOT ULCER, LEFT, WITH FAT LAYER EXPOSED (HCC): Primary | ICD-10-CM

## 2017-11-09 DIAGNOSIS — Z89.432 S/P TRANSMETATARSAL AMPUTATION OF FOOT, LEFT (HCC): ICD-10-CM

## 2017-11-09 DIAGNOSIS — L89.623 DECUBITUS ULCER OF LEFT HEEL, STAGE 3 (HCC): ICD-10-CM

## 2017-11-09 PROCEDURE — 11042 DBRDMT SUBQ TIS 1ST 20SQCM/<: CPT | Performed by: PODIATRIST

## 2017-11-09 RX ORDER — HYDROCODONE BITARTRATE AND ACETAMINOPHEN 10; 325 MG/1; MG/1
1 TABLET ORAL EVERY 6 HOURS PRN
Qty: 40 TABLET | Refills: 0 | Status: SHIPPED | OUTPATIENT
Start: 2017-11-09 | End: 2017-11-27 | Stop reason: SDUPTHER

## 2017-11-09 NOTE — PROGRESS NOTES
Damian Wild  1960  56 y.o. male    11/09/2017  Chief Complaint   Patient presents with   • Left Foot - Follow-up, Wound Check           History of Present Illness    Damian Wild is a 56 y.o. male            Past Medical History:   Diagnosis Date   • Hypertension    • Stroke          Past Surgical History:   Procedure Laterality Date   • AMPUTATION DIGIT Left 4/19/2017    Procedure: LEFT FOOT TRANSMETATARSAL AMPUTATION, TENDO ACHILLES LENGTHENING.;  Surgeon: Estuardo Fried DPM;  Location: [facility];  Service:    • FOOT SURGERY     • TOE AMPUTATION Right     All toes removed on right foot   • TOE AMPUTATION Left     great toe removed   • TONSILLECTOMY           Family History   Problem Relation Age of Onset   • Family history unknown: Yes         Social History     Social History   • Marital status:      Spouse name: N/A   • Number of children: N/A   • Years of education: N/A     Occupational History   • Not on file.     Social History Main Topics   • Smoking status: Current Some Day Smoker   • Smokeless tobacco: Never Used      Comment: one cigarette a month   • Alcohol use No   • Drug use: No   • Sexual activity: Defer     Other Topics Concern   • Not on file     Social History Narrative         Current Outpatient Prescriptions   Medication Sig Dispense Refill   • ciprofloxacin (CIPRO) 500 MG tablet Take 1 tablet by mouth 2 (Two) Times a Day. 20 tablet 0   • clindamycin (CLEOCIN) 300 MG capsule Take 1 capsule by mouth 3 (Three) Times a Day. 30 capsule 0   • HYDROcodone-acetaminophen (NORCO)  MG per tablet Take 1 tablet by mouth Every 6 (Six) Hours As Needed for Moderate Pain . 40 tablet 0   • lisinopril-hydrochlorothiazide (PRINZIDE,ZESTORETIC) 20-25 MG per tablet   1   • sulfamethoxazole-trimethoprim (BACTRIM DS) 800-160 MG per tablet Take 1 tablet by mouth 2 (Two) Times a Day. 20 tablet 0     No current facility-administered medications for this visit.   "        OBJECTIVE    BP (!) 203/92  Pulse 80  Temp 98.9 °F (37.2 °C)  Ht 70\" (177.8 cm)  Wt 213 lb (96.6 kg)  SpO2 95%  BMI 30.56 kg/m2      Review of Systems      Physical Exam   Constitutional: he appears well-developed and well-nourished.   CV: No chest pain. Normal RR  Resp: Non labored respirations  Psychiatric: he has a normal mood and affect. her behavior is normal.      Lower Extremity Exam:  Vascular: DP/PT pulses palpable 2+.   *** hallux edema  Toes warm  Neuro: Protective sensation intact, b/l.  DTRs intact  Integument: No open wounds.  Ingrown *** nail border, *** hallux. Mild erythema, edema. +tenderness to palpation.  Web spaces c/d/i  No skin lesions  Musculoskeletal: LE muscle strength 5/5.   Gait normal  Ankle ROM full without pain or crepitus  STJ ROM full without pain or crepitus  No digital deformities            ASSESSMENT AND PLAN    There are no diagnoses linked to this encounter.          This document has been electronically signed by Estuardo Fried DPM on November 9, 2017 12:11 PM     EMR Dragon/Transcription disclaimer:   Much of this encounter note is an electronic transcription/translation of spoken language to printed text. The electronic translation of spoken language may permit erroneous, or at times, nonsensical words or phrases to be inadvertently transcribed; Although I have reviewed the note for such errors, some may still exist.    Estuardo Fried DPM  11/9/2017  12:11 PM            "

## 2017-11-09 NOTE — PROGRESS NOTES
Damian Wild  1960  57 y.o. male     Patient presents today for a recheck of the left foot.   11/09/2017            Chief Complaint   Patient presents with   • Left Foot - Follow-up, Wound Check       History of Present Illness    Mr Wild is a 57 y/o male who presents s/p left transmetatarsal amputation. DOS 4/19/17. He has subsequently developed a left heel and forefoot ulceration.  We have had him in a total contact cast , he did experience some leg swelling and states the cast has become quite painful Therefore we placed him into an offloading surgical shoe at last visit.  He states he is doing okay, pain is improving.    Past Medical History:   Diagnosis Date   • Hypertension    • Stroke          Past Surgical History:   Procedure Laterality Date   • AMPUTATION DIGIT Left 4/19/2017    Procedure: LEFT FOOT TRANSMETATARSAL AMPUTATION, TENDO ACHILLES LENGTHENING.;  Surgeon: Estuardo Fried DPM;  Location: [facility];  Service:    • FOOT SURGERY     • TOE AMPUTATION Right     All toes removed on right foot   • TOE AMPUTATION Left     great toe removed   • TONSILLECTOMY           Family History   Problem Relation Age of Onset   • Family history unknown: Yes         Social History     Social History   • Marital status:      Spouse name: N/A   • Number of children: N/A   • Years of education: N/A     Occupational History   • Not on file.     Social History Main Topics   • Smoking status: Current Some Day Smoker   • Smokeless tobacco: Never Used      Comment: one cigarette a month   • Alcohol use No   • Drug use: No   • Sexual activity: Defer     Other Topics Concern   • Not on file     Social History Narrative         Current Outpatient Prescriptions   Medication Sig Dispense Refill   • ciprofloxacin (CIPRO) 500 MG tablet Take 1 tablet by mouth 2 (Two) Times a Day. 20 tablet 0   • clindamycin (CLEOCIN) 300 MG capsule Take 1 capsule by mouth 3 (Three) Times a Day. 30 capsule 0   •  "HYDROcodone-acetaminophen (NORCO)  MG per tablet Take 1 tablet by mouth Every 6 (Six) Hours As Needed for Moderate Pain . 40 tablet 0   • lisinopril-hydrochlorothiazide (PRINZIDE,ZESTORETIC) 20-25 MG per tablet   1   • sulfamethoxazole-trimethoprim (BACTRIM DS) 800-160 MG per tablet Take 1 tablet by mouth 2 (Two) Times a Day. 20 tablet 0     No current facility-administered medications for this visit.          OBJECTIVE    BP (!) 203/92  Pulse 80  Temp 98.9 °F (37.2 °C)  Ht 70\" (177.8 cm)  Wt 213 lb (96.6 kg)  SpO2 95%  BMI 30.56 kg/m2      Review of Systems   Constitutional:  Denies recent weight loss, weight gain, fever or chills, no change in exercise tolerance  Musculoskeletal: Foot pain. H/o toe amputations   Skin:  Left foot incision  Neurological:  Burning sensations to feet b/l.  Psychiatric/Behavioral: Denies depression     Physical Exam   Constitutional: he appears well-developed and well-nourished.   HEENT: Normocephalic. Atraumatic.  CV: No CP. RRR  Resp: Non-labored respirations.  Psychiatric: he has a normal mood and affect. his behavior is normal.      Lower Extremity Exam:  Vascular: DP/PT pulses palpable 1+.   Negative hair growth.   2+ bilateral leg edema  Negative Jah  Feel cool to touch  Neuro: Protective sensation absent, b/l.  DTRs intact  Integument:   Atrophic skin noted b/l  Full thickness ulceration to plantar left heel. Approx 1.9 x 1.7 x 0.2 cm. Mild drainage, no PTB.  Ulceration to sub-first metatarsal  approximately 1.3 x 1.5 x 0.8 centimeters.  Surrounding erythema noted.  No probe to bone no fluctuance.  Both ulcerations postop appearance with erythematous rim noted.  Musculoskeletal: LE muscle strength 5/5.   Gait  normal  B/L TMA    Procedures        Left foot wound debridement:  Risks and benefits discussed.  Left plantar heel debrided of surrounding hyperkeratosis and devitalized tissue with 15 blade to level of subq  Pressure hemostasis obtained  Tolerated " well        ASSESSMENT AND PLAN    Damian was seen today for follow-up and wound check.    Diagnoses and all orders for this visit:    Foot ulcer, left, with fat layer exposed    Decubitus ulcer of left heel, stage 3    S/P transmetatarsal amputation of foot, left    Foot pain, left    Other orders  -     HYDROcodone-acetaminophen (NORCO)  MG per tablet; Take 1 tablet by mouth Every 6 (Six) Hours As Needed for Moderate Pain .  -     Cancel: Arthrocentesis  -     Cancel: Nail Removal  -     Cancel: Nail Removal    -Comprehensive foot and ankle exam performed  -Wound debridement as above  -Given improvement of wound dimensions will continue with offloading surgical shoe today.  -Recheck 1 week, possible theraskin application            This document has been electronically signed by Estuardo Fried DPM on November 24, 2017 3:46 PM     Estuardo Fried DPM  11/24/2017  3:46 PM

## 2017-11-13 ENCOUNTER — OFFICE VISIT (OUTPATIENT)
Dept: PODIATRY | Facility: CLINIC | Age: 57
End: 2017-11-13

## 2017-11-13 VITALS — WEIGHT: 213 LBS | HEIGHT: 70 IN | BODY MASS INDEX: 30.49 KG/M2

## 2017-11-13 DIAGNOSIS — L97.522 FOOT ULCER, LEFT, WITH FAT LAYER EXPOSED (HCC): Primary | ICD-10-CM

## 2017-11-13 DIAGNOSIS — Z89.432 S/P TRANSMETATARSAL AMPUTATION OF FOOT, LEFT (HCC): ICD-10-CM

## 2017-11-13 DIAGNOSIS — L89.623 DECUBITUS ULCER OF LEFT HEEL, STAGE 3 (HCC): ICD-10-CM

## 2017-11-13 PROCEDURE — 11042 DBRDMT SUBQ TIS 1ST 20SQCM/<: CPT | Performed by: PODIATRIST

## 2017-11-13 RX ORDER — CLINDAMYCIN HYDROCHLORIDE 300 MG/1
300 CAPSULE ORAL 3 TIMES DAILY
Qty: 30 CAPSULE | Refills: 0 | Status: SHIPPED | OUTPATIENT
Start: 2017-11-13 | End: 2017-11-27 | Stop reason: SDUPTHER

## 2017-11-13 RX ORDER — CIPROFLOXACIN 500 MG/1
500 TABLET, FILM COATED ORAL 2 TIMES DAILY
Qty: 20 TABLET | Refills: 0 | Status: SHIPPED | OUTPATIENT
Start: 2017-11-13 | End: 2017-11-27 | Stop reason: SDUPTHER

## 2017-11-13 NOTE — PROGRESS NOTES
Damian Wild  1960  57 y.o. male     Patient presents today for a recheck of the left foot.   10/26/2017          Chief Complaint   Patient presents with   • Left Foot - Follow-up, Wound Check       History of Present Illness    Mr Wild is a 57 y/o male who presents s/p left transmetatarsal amputation. DOS 4/19/17. He has subsequently developed a left heel and forefoot ulceration.  We have had him in a total contact cast , he did experience some leg swelling and states the cast has become quite painful.    Past Medical History:   Diagnosis Date   • Hypertension    • Stroke          Past Surgical History:   Procedure Laterality Date   • AMPUTATION DIGIT Left 4/19/2017    Procedure: LEFT FOOT TRANSMETATARSAL AMPUTATION, TENDO ACHILLES LENGTHENING.;  Surgeon: Estuardo Fried DPM;  Location: [facility];  Service:    • FOOT SURGERY     • TOE AMPUTATION Right     All toes removed on right foot   • TOE AMPUTATION Left     great toe removed   • TONSILLECTOMY           Family History   Problem Relation Age of Onset   • Family history unknown: Yes         Social History     Social History   • Marital status:      Spouse name: N/A   • Number of children: N/A   • Years of education: N/A     Occupational History   • Not on file.     Social History Main Topics   • Smoking status: Current Some Day Smoker   • Smokeless tobacco: Never Used      Comment: one cigarette a month   • Alcohol use No   • Drug use: No   • Sexual activity: Defer     Other Topics Concern   • Not on file     Social History Narrative         Current Outpatient Prescriptions   Medication Sig Dispense Refill   • ciprofloxacin (CIPRO) 500 MG tablet Take 1 tablet by mouth 2 (Two) Times a Day. 20 tablet 0   • clindamycin (CLEOCIN) 300 MG capsule Take 1 capsule by mouth 3 (Three) Times a Day. 30 capsule 0   • HYDROcodone-acetaminophen (NORCO)  MG per tablet Take 1 tablet by mouth Every 6 (Six) Hours As Needed for Moderate Pain . 40  "tablet 0   • lisinopril-hydrochlorothiazide (PRINZIDE,ZESTORETIC) 20-25 MG per tablet   1   • sulfamethoxazole-trimethoprim (BACTRIM DS) 800-160 MG per tablet Take 1 tablet by mouth 2 (Two) Times a Day. 20 tablet 0     No current facility-administered medications for this visit.          OBJECTIVE    Ht 70\" (177.8 cm)  Wt 213 lb (96.6 kg)  BMI 30.56 kg/m2      Review of Systems   Constitutional:  Denies recent weight loss, weight gain, fever or chills, no change in exercise tolerance  Musculoskeletal: Foot pain. H/o toe amputations   Skin:  Left foot incision  Neurological:  Burning sensations to feet b/l.  Psychiatric/Behavioral: Denies depression     Physical Exam   Constitutional: he appears well-developed and well-nourished.   HEENT: Normocephalic. Atraumatic.  CV: No CP. RRR  Resp: Non-labored respirations.  Psychiatric: he has a normal mood and affect. his behavior is normal.      Lower Extremity Exam:  Vascular: DP/PT pulses palpable 1+.   Negative hair growth.   2+ bilateral leg edema  Negative Jah  Feel cool to touch  Neuro: Protective sensation absent, b/l.  DTRs intact  Integument:   Atrophic skin noted b/l  Full thickness ulceration to plantar left heel. Approx 2.5 x 2.0 x 0.2 cm. Mild drainage, no PTB.  Ulceration to sub-first metatarsal  approximately 2.5 x 2.5 centimeters.  Surrounding erythema noted.  No probe to bone no fluctuance.  Both ulcerations postop appearance with erythematous rim noted.  Musculoskeletal: LE muscle strength 5/5.   Gait  normal  B/L TMA    Procedures    Right heel injection:  Risks and benefits discussed. Written consent obtained.  Skin prepped with alcohol  Medial heel injection 1cc 0.5% Marcaine, 1cc Celestone with 27g needle  Band-aid applied.  Pt tolerated well.      Left foot wound debridement:  Risks and benefits discussed.  Left plantar heel debrided of surrounding hyperkeratosis and devitalized tissue with 15 blade to level of subq  Pressure hemostasis " obtained  Tolerated well        ASSESSMENT AND PLAN    There are no diagnoses linked to this encounter.  -Comprehensive foot and ankle exam performed  -Wound debridement as above  -Will give TCC holiday given increased pain. Surgical shoe with offloading cutouts fabricated, to be worn at all times  -Recheck 1 week, possible theraskin application            This document has been electronically signed by Fatimah Fried MA on November 13, 2017 11:04 AM     Fatimah Fried MA  11/13/2017  11:04 AM

## 2017-11-26 NOTE — PROGRESS NOTES
Damian Wild  1960  57 y.o. male     Patient presents today for a recheck of the left foot.   11/13/2017      Chief Complaint   Patient presents with   • Left Foot - Follow-up, Wound Check       History of Present Illness    Mr Wild is a 56 y/o male who presents s/p left transmetatarsal amputation. DOS 4/19/17. He has subsequently developed a left heel and forefoot ulceration.  We have had him in a total contact cast , he did experience some leg swelling and states the cast has become quite painful Therefore we placed him into an offloading surgical shoe.  He states he is doing okay, pain is improving.    Past Medical History:   Diagnosis Date   • Hypertension    • Stroke          Past Surgical History:   Procedure Laterality Date   • AMPUTATION DIGIT Left 4/19/2017    Procedure: LEFT FOOT TRANSMETATARSAL AMPUTATION, TENDO ACHILLES LENGTHENING.;  Surgeon: Estuardo Fried DPM;  Location: [facility];  Service:    • FOOT SURGERY     • TOE AMPUTATION Right     All toes removed on right foot   • TOE AMPUTATION Left     great toe removed   • TONSILLECTOMY           Family History   Problem Relation Age of Onset   • Family history unknown: Yes         Social History     Social History   • Marital status:      Spouse name: N/A   • Number of children: N/A   • Years of education: N/A     Occupational History   • Not on file.     Social History Main Topics   • Smoking status: Current Some Day Smoker   • Smokeless tobacco: Never Used      Comment: one cigarette a month   • Alcohol use No   • Drug use: No   • Sexual activity: Defer     Other Topics Concern   • Not on file     Social History Narrative         Current Outpatient Prescriptions   Medication Sig Dispense Refill   • ciprofloxacin (CIPRO) 500 MG tablet Take 1 tablet by mouth 2 (Two) Times a Day. 20 tablet 0   • clindamycin (CLEOCIN) 300 MG capsule Take 1 capsule by mouth 3 (Three) Times a Day. 30 capsule 0   • HYDROcodone-acetaminophen (NORCO)  " MG per tablet Take 1 tablet by mouth Every 6 (Six) Hours As Needed for Moderate Pain . 40 tablet 0   • lisinopril-hydrochlorothiazide (PRINZIDE,ZESTORETIC) 20-25 MG per tablet   1   • sulfamethoxazole-trimethoprim (BACTRIM DS) 800-160 MG per tablet Take 1 tablet by mouth 2 (Two) Times a Day. 20 tablet 0     No current facility-administered medications for this visit.          OBJECTIVE    Ht 70\" (177.8 cm)  Wt 213 lb (96.6 kg)  BMI 30.56 kg/m2      Review of Systems   Constitutional:  Denies recent weight loss, weight gain, fever or chills, no change in exercise tolerance  Musculoskeletal: Foot pain. H/o toe amputations   Skin:  Left foot incision  Neurological:  Burning sensations to feet b/l.  Psychiatric/Behavioral: Denies depression     Physical Exam   Constitutional: he appears well-developed and well-nourished.   HEENT: Normocephalic. Atraumatic.  CV: No CP. RRR  Resp: Non-labored respirations.  Psychiatric: he has a normal mood and affect. his behavior is normal.      Lower Extremity Exam:  Vascular: DP/PT pulses palpable 1+.   Negative hair growth.   2+ bilateral leg edema  Negative Jah  Feel cool to touch  Neuro: Protective sensation absent, b/l.  DTRs intact  Integument:   Atrophic skin noted b/l  Full thickness ulceration to plantar left heel. Approx 2 x 2 x 0.2 cm. Mild drainage, no PTB.  Ulceration to sub-first metatarsal  approximately 1.3 x 1.5 x 0.8 centimeters.  Surrounding erythema noted.  No probe to bone no fluctuance.  Both ulcerations postop appearance with erythematous rim noted.  Musculoskeletal: LE muscle strength 5/5.   Gait  normal  B/L TMA    Procedures        Left foot wound debridement:  Risks and benefits discussed.  Left plantar heel debrided of surrounding hyperkeratosis and devitalized tissue with 15 blade to level of subq  Pressure hemostasis obtained  Tolerated well        ASSESSMENT AND PLAN    Damian was seen today for follow-up and wound check.    Diagnoses and all " orders for this visit:    Foot ulcer, left, with fat layer exposed    Decubitus ulcer of left heel, stage 3    S/P transmetatarsal amputation of foot, left    Other orders  -     clindamycin (CLEOCIN) 300 MG capsule; Take 1 capsule by mouth 3 (Three) Times a Day.  -     ciprofloxacin (CIPRO) 500 MG tablet; Take 1 tablet by mouth 2 (Two) Times a Day.      -Comprehensive foot and ankle exam performed  -Wound debridement as above  -Continue with offloading surgical shoe today.  -Recheck 1 week, possible theraskin application            This document has been electronically signed by Estuardo Fried DPM on November 26, 2017 1:30 PM     Estuardo Fried DPM  11/26/2017  1:30 PM

## 2017-11-27 ENCOUNTER — OFFICE VISIT (OUTPATIENT)
Dept: PODIATRY | Facility: CLINIC | Age: 57
End: 2017-11-27

## 2017-11-27 VITALS — WEIGHT: 213 LBS | BODY MASS INDEX: 30.49 KG/M2 | HEIGHT: 70 IN

## 2017-11-27 DIAGNOSIS — Z89.432 S/P TRANSMETATARSAL AMPUTATION OF FOOT, LEFT (HCC): ICD-10-CM

## 2017-11-27 DIAGNOSIS — L89.623 DECUBITUS ULCER OF LEFT HEEL, STAGE 3 (HCC): ICD-10-CM

## 2017-11-27 DIAGNOSIS — M79.672 FOOT PAIN, LEFT: ICD-10-CM

## 2017-11-27 DIAGNOSIS — L97.522 FOOT ULCER, LEFT, WITH FAT LAYER EXPOSED (HCC): Primary | ICD-10-CM

## 2017-11-27 PROCEDURE — 11042 DBRDMT SUBQ TIS 1ST 20SQCM/<: CPT | Performed by: PODIATRIST

## 2017-11-27 RX ORDER — CLINDAMYCIN HYDROCHLORIDE 300 MG/1
300 CAPSULE ORAL 3 TIMES DAILY
Qty: 30 CAPSULE | Refills: 0 | Status: SHIPPED | OUTPATIENT
Start: 2017-11-27

## 2017-11-27 RX ORDER — CIPROFLOXACIN 500 MG/1
500 TABLET, FILM COATED ORAL 2 TIMES DAILY
Qty: 20 TABLET | Refills: 0 | Status: SHIPPED | OUTPATIENT
Start: 2017-11-27

## 2017-11-27 RX ORDER — HYDROCODONE BITARTRATE AND ACETAMINOPHEN 10; 325 MG/1; MG/1
1 TABLET ORAL EVERY 6 HOURS PRN
Qty: 60 TABLET | Refills: 0 | Status: SHIPPED | OUTPATIENT
Start: 2017-11-27

## 2017-11-27 NOTE — PROGRESS NOTES
Damian Wild  1960  57 y.o. male     Patient presents today for a recheck of the left foot.   11/27/2017        Chief Complaint   Patient presents with   • Left Foot - Wound Check, Follow-up       History of Present Illness    Mr Wild is a 56 y/o male who presents s/p left transmetatarsal amputation. DOS 4/19/17. He has subsequently developed a left heel and forefoot ulceration.  We have had him in a total contact cast , he did experience some leg swelling and states the cast has become quite painful Therefore we placed him into an offloading surgical shoe.  He states he is doing okay, Continues to have some persistent pain.    Past Medical History:   Diagnosis Date   • Hypertension    • Stroke          Past Surgical History:   Procedure Laterality Date   • AMPUTATION DIGIT Left 4/19/2017    Procedure: LEFT FOOT TRANSMETATARSAL AMPUTATION, TENDO ACHILLES LENGTHENING.;  Surgeon: Estuardo Fried DPM;  Location: [facility];  Service:    • FOOT SURGERY     • TOE AMPUTATION Right     All toes removed on right foot   • TOE AMPUTATION Left     great toe removed   • TONSILLECTOMY           Family History   Problem Relation Age of Onset   • Family history unknown: Yes         Social History     Social History   • Marital status:      Spouse name: N/A   • Number of children: N/A   • Years of education: N/A     Occupational History   • Not on file.     Social History Main Topics   • Smoking status: Current Some Day Smoker   • Smokeless tobacco: Never Used      Comment: one cigarette a month   • Alcohol use No   • Drug use: No   • Sexual activity: Defer     Other Topics Concern   • Not on file     Social History Narrative         Current Outpatient Prescriptions   Medication Sig Dispense Refill   • ciprofloxacin (CIPRO) 500 MG tablet Take 1 tablet by mouth 2 (Two) Times a Day. 20 tablet 0   • clindamycin (CLEOCIN) 300 MG capsule Take 1 capsule by mouth 3 (Three) Times a Day. 30 capsule 0   •  "HYDROcodone-acetaminophen (NORCO)  MG per tablet Take 1 tablet by mouth Every 6 (Six) Hours As Needed for Moderate Pain . 60 tablet 0   • lisinopril-hydrochlorothiazide (PRINZIDE,ZESTORETIC) 20-25 MG per tablet   1     No current facility-administered medications for this visit.          OBJECTIVE    Ht 70\" (177.8 cm)  Wt 213 lb (96.6 kg)  BMI 30.56 kg/m2      Review of Systems   Constitutional:  Denies recent weight loss, weight gain, fever or chills, no change in exercise tolerance  Musculoskeletal: Foot pain. H/o toe amputations   Skin:  Left foot incision  Neurological:  Burning sensations to feet b/l.  Psychiatric/Behavioral: Denies depression     Physical Exam   Constitutional: he appears well-developed and well-nourished.   HEENT: Normocephalic. Atraumatic.  CV: No CP. RRR  Resp: Non-labored respirations.  Psychiatric: he has a normal mood and affect. his behavior is normal.      Lower Extremity Exam:  Vascular: DP/PT pulses palpable 1+.   Negative hair growth.   2+ bilateral leg edema  Negative Jah  Feel cool to touch  Neuro: Protective sensation absent, b/l.  DTRs intact  Integument:   Atrophic skin noted b/l  Full thickness ulceration to plantar left heel. Approx 1.8 x 1.8 x 0.4 cm. Mild drainage, no PTB.  Ulceration to sub-first metatarsal  approximately 1.0 x 0.4 x 0.6 centimeters.  Surrounding erythema noted.  No probe to bone no fluctuance.  Musculoskeletal: LE muscle strength 5/5.   Gait  normal  B/L TMA    Procedures        Left foot wound debridement:  Risks and benefits discussed.  Left plantar heel debrided of surrounding hyperkeratosis and devitalized tissue with 15 blade to level of subq  Pressure hemostasis obtained  Tolerated well        ASSESSMENT AND PLAN    Damian was seen today for wound check and follow-up.    Diagnoses and all orders for this visit:    Foot ulcer, left, with fat layer exposed    Decubitus ulcer of left heel, stage 3    S/P transmetatarsal amputation of foot, " left    Foot pain, left    Other orders  -     HYDROcodone-acetaminophen (NORCO)  MG per tablet; Take 1 tablet by mouth Every 6 (Six) Hours As Needed for Moderate Pain .  -     ciprofloxacin (CIPRO) 500 MG tablet; Take 1 tablet by mouth 2 (Two) Times a Day.  -     clindamycin (CLEOCIN) 300 MG capsule; Take 1 capsule by mouth 3 (Three) Times a Day.    -Comprehensive foot and ankle exam performed  -Wound debridement as above  -Continue with offloading surgical shoe today.  -Recheck 1 week, possible theraskin application            This document has been electronically signed by Estuardo Fried DPM on November 27, 2017 1:02 PM     Estuardo Fried DPM  11/27/2017  1:02 PM

## 2017-12-14 ENCOUNTER — TELEPHONE (OUTPATIENT)
Dept: PODIATRY | Facility: CLINIC | Age: 57
End: 2017-12-14

## 2017-12-14 NOTE — TELEPHONE ENCOUNTER
DR BERRIOS IS CURRENTLY TAKING CARE OF PATIENT IN HOSPITAL.  SAYS HE HAS STAPH INFECTION.  WANTS TO KNOW IF YOU EVER GREW ANYTHING FROM SAMPLES TAKEN.    PLEASE CALL HER TO DISCUSS OTHER QUESTIONS ABOUT PATIENT    THANKS.

## (undated) DEVICE — SUT ETHLN 4/0 FS2 18IN 662H

## (undated) DEVICE — SUT ETHLN 3/0 FS1 663G

## (undated) DEVICE — SYR 10ML

## (undated) DEVICE — BNDG ELAS CO-FLEX SLF ADHR 3IN5YD LF2 STRL

## (undated) DEVICE — GLV SURG SENSICARE GREEN W/ALOE PF LF 7 STRL

## (undated) DEVICE — 9165 UNIVERSAL PATIENT PLATE: Brand: 3M™

## (undated) DEVICE — SPNG GZ WOVN 4X4IN 12PLY 10/BX STRL

## (undated) DEVICE — GAUZE,PACKING STRIP,PLAIN,1/2"X5YD,STRL: Brand: CURAD

## (undated) DEVICE — SOL IRR NACL 0.9PCT BT 1000ML

## (undated) DEVICE — GOWN,AURORA,NOREINF,RAGLAN,XL,STERILE: Brand: MEDLINE

## (undated) DEVICE — INTENDED FOR TISSUE SEPARATION, AND OTHER PROCEDURES THAT REQUIRE A SHARP SURGICAL BLADE TO PUNCTURE OR CUT.: Brand: BARD-PARKER ® CARBON RIB-BACK BLADES

## (undated) DEVICE — SPNG LAP 18X18IN LF STRL PK/5

## (undated) DEVICE — CONTAINER,SPECIMEN,OR STERILE,4OZ: Brand: MEDLINE

## (undated) DEVICE — PRECISION THIN (9.0 X 0.38 X 31.0MM)

## (undated) DEVICE — STERILE POLYISOPRENE POWDER-FREE SURGICAL GLOVES: Brand: PROTEXIS

## (undated) DEVICE — SUT VIC 3/0 CTI 36IN J944H

## (undated) DEVICE — STERILE POLYISOPRENE POWDER-FREE SURGICAL GLOVES WITH EMOLLIENT COATING: Brand: PROTEXIS

## (undated) DEVICE — NDL HYPO ECLPS SFTY 25G 1 1/2IN

## (undated) DEVICE — PK POD 60

## (undated) DEVICE — BNDG ELAS ELITE V/CLOSE 4IN 5YD LF STRL

## (undated) DEVICE — UNDRPD BREATH 23X36 BG/10

## (undated) DEVICE — DRAPE,U/ SHT,SPLIT,PLAS,STERIL: Brand: MEDLINE

## (undated) DEVICE — PREP PVP-I 7.5P BT 4OZ

## (undated) DEVICE — SPLNT PLSTR CAST 5IN

## (undated) DEVICE — DRSNG GZ CURAD XEROFORM NONADHR OVERWRAP 5X9IN

## (undated) DEVICE — DISPOSABLE TOURNIQUET CUFF SINGLE BLADDER, DUAL PORT AND QUICK CONNECT CONNECTOR: Brand: COLOR CUFF

## (undated) DEVICE — GLV SURG SENSICARE GREEN W/ALOE PF LF 6.5 STRL

## (undated) DEVICE — PREP SOL POVIDONE/IODINE BT 4OZ